# Patient Record
Sex: FEMALE | Race: WHITE | NOT HISPANIC OR LATINO | Employment: OTHER | URBAN - NONMETROPOLITAN AREA
[De-identification: names, ages, dates, MRNs, and addresses within clinical notes are randomized per-mention and may not be internally consistent; named-entity substitution may affect disease eponyms.]

---

## 2017-01-12 ENCOUNTER — HOSPITAL ENCOUNTER (OUTPATIENT)
Dept: ULTRASOUND IMAGING | Facility: HOSPITAL | Age: 77
Discharge: HOME | End: 2017-01-12
Attending: FAMILY MEDICINE | Admitting: FAMILY MEDICINE
Payer: MEDICARE

## 2017-01-12 PROCEDURE — 76536 US EXAM OF HEAD AND NECK: CPT | Mod: TC

## 2017-01-19 ENCOUNTER — INFUSION THERAPY VISIT (OUTPATIENT)
Dept: INFUSION THERAPY | Facility: OTHER | Age: 77
End: 2017-01-19
Attending: NURSE PRACTITIONER
Payer: MEDICARE

## 2017-01-19 ENCOUNTER — ONCOLOGY VISIT (OUTPATIENT)
Dept: ONCOLOGY | Facility: OTHER | Age: 77
End: 2017-01-19
Attending: NURSE PRACTITIONER
Payer: MEDICARE

## 2017-01-19 VITALS
HEART RATE: 84 BPM | WEIGHT: 101 LBS | SYSTOLIC BLOOD PRESSURE: 130 MMHG | HEIGHT: 59 IN | BODY MASS INDEX: 20.36 KG/M2 | RESPIRATION RATE: 14 BRPM | DIASTOLIC BLOOD PRESSURE: 66 MMHG | OXYGEN SATURATION: 97 % | TEMPERATURE: 97.5 F

## 2017-01-19 VITALS
WEIGHT: 101 LBS | BODY MASS INDEX: 20.36 KG/M2 | SYSTOLIC BLOOD PRESSURE: 140 MMHG | RESPIRATION RATE: 14 BRPM | DIASTOLIC BLOOD PRESSURE: 65 MMHG | HEART RATE: 84 BPM | HEIGHT: 59 IN | TEMPERATURE: 97.5 F | OXYGEN SATURATION: 97 %

## 2017-01-19 DIAGNOSIS — E04.2 NON-TOXIC MULTINODULAR GOITER: ICD-10-CM

## 2017-01-19 DIAGNOSIS — C91.10 CHRONIC LYMPHOCYTIC LEUKEMIA (H): Primary | ICD-10-CM

## 2017-01-19 LAB
ALBUMIN SERPL-MCNC: 3.7 G/DL (ref 3.4–5)
ALP SERPL-CCNC: 78 U/L (ref 40–150)
ALT SERPL W P-5'-P-CCNC: 38 U/L (ref 0–50)
ANION GAP SERPL CALCULATED.3IONS-SCNC: 9 MMOL/L (ref 3–14)
AST SERPL W P-5'-P-CCNC: 31 U/L (ref 0–45)
BASOPHILS # BLD AUTO: 0 10E9/L (ref 0–0.2)
BASOPHILS NFR BLD AUTO: 0.1 %
BILIRUB SERPL-MCNC: 0.7 MG/DL (ref 0.2–1.3)
BUN SERPL-MCNC: 16 MG/DL (ref 7–30)
CALCIUM SERPL-MCNC: 8.8 MG/DL (ref 8.5–10.1)
CHLORIDE SERPL-SCNC: 103 MMOL/L (ref 94–109)
CO2 SERPL-SCNC: 29 MMOL/L (ref 20–32)
CREAT SERPL-MCNC: 0.81 MG/DL (ref 0.52–1.04)
DIFFERENTIAL METHOD BLD: NORMAL
EOSINOPHIL # BLD AUTO: 0.1 10E9/L (ref 0–0.7)
EOSINOPHIL NFR BLD AUTO: 1.2 %
ERYTHROCYTE [DISTWIDTH] IN BLOOD BY AUTOMATED COUNT: 13.9 % (ref 10–15)
GFR SERPL CREATININE-BSD FRML MDRD: 69 ML/MIN/1.7M2
GLUCOSE SERPL-MCNC: 97 MG/DL (ref 70–99)
HCT VFR BLD AUTO: 40.7 % (ref 35–47)
HGB BLD-MCNC: 13.7 G/DL (ref 11.7–15.7)
IMM GRANULOCYTES # BLD: 0 10E9/L (ref 0–0.4)
IMM GRANULOCYTES NFR BLD: 0.1 %
LDH SERPL L TO P-CCNC: 193 U/L (ref 81–234)
LYMPHOCYTES # BLD AUTO: 1.5 10E9/L (ref 0.8–5.3)
LYMPHOCYTES NFR BLD AUTO: 22.4 %
MCH RBC QN AUTO: 29.7 PG (ref 26.5–33)
MCHC RBC AUTO-ENTMCNC: 33.7 G/DL (ref 31.5–36.5)
MCV RBC AUTO: 88 FL (ref 78–100)
MONOCYTES # BLD AUTO: 0.6 10E9/L (ref 0–1.3)
MONOCYTES NFR BLD AUTO: 8.4 %
NEUTROPHILS # BLD AUTO: 4.6 10E9/L (ref 1.6–8.3)
NEUTROPHILS NFR BLD AUTO: 67.8 %
NRBC # BLD AUTO: 0 10*3/UL
NRBC BLD AUTO-RTO: 0 /100
PLATELET # BLD AUTO: 205 10E9/L (ref 150–450)
POTASSIUM SERPL-SCNC: 3.7 MMOL/L (ref 3.4–5.3)
PROT SERPL-MCNC: 6.7 G/DL (ref 6.8–8.8)
RBC # BLD AUTO: 4.61 10E12/L (ref 3.8–5.2)
SODIUM SERPL-SCNC: 141 MMOL/L (ref 133–144)
TSH SERPL DL<=0.05 MIU/L-ACNC: 3.13 MU/L (ref 0.4–4)
WBC # BLD AUTO: 6.8 10E9/L (ref 4–11)

## 2017-01-19 PROCEDURE — 84443 ASSAY THYROID STIM HORMONE: CPT | Performed by: NURSE PRACTITIONER

## 2017-01-19 PROCEDURE — 99212 OFFICE O/P EST SF 10 MIN: CPT

## 2017-01-19 PROCEDURE — 85025 COMPLETE CBC W/AUTO DIFF WBC: CPT | Performed by: NURSE PRACTITIONER

## 2017-01-19 PROCEDURE — 80053 COMPREHEN METABOLIC PANEL: CPT | Performed by: NURSE PRACTITIONER

## 2017-01-19 PROCEDURE — 25000125 ZZHC RX 250: Performed by: INTERNAL MEDICINE

## 2017-01-19 PROCEDURE — 99213 OFFICE O/P EST LOW 20 MIN: CPT | Performed by: NURSE PRACTITIONER

## 2017-01-19 PROCEDURE — 96523 IRRIG DRUG DELIVERY DEVICE: CPT

## 2017-01-19 PROCEDURE — 36415 COLL VENOUS BLD VENIPUNCTURE: CPT | Performed by: NURSE PRACTITIONER

## 2017-01-19 PROCEDURE — 83615 LACTATE (LD) (LDH) ENZYME: CPT | Performed by: NURSE PRACTITIONER

## 2017-01-19 RX ORDER — HEPARIN SODIUM (PORCINE) LOCK FLUSH IV SOLN 100 UNIT/ML 100 UNIT/ML
500 SOLUTION INTRAVENOUS EVERY 8 HOURS
Status: DISCONTINUED | OUTPATIENT
Start: 2017-01-19 | End: 2017-01-19 | Stop reason: HOSPADM

## 2017-01-19 RX ADMIN — HEPARIN 500 UNITS: 100 SYRINGE at 08:51

## 2017-01-19 ASSESSMENT — PAIN SCALES - GENERAL: PAINLEVEL: NO PAIN (0)

## 2017-01-19 NOTE — MR AVS SNAPSHOT
After Visit Summary   1/19/2017    Jennifer Barajas    MRN: [id number]           Patient Information     Date Of Birth          1940        Visit Information        Provider Department      1/19/2017 9:30 AM Yaneli Gray NP [facility] [place]        Care Instructions    We would like to see you back in 3 months. Please come 1week(s) prior for lab work.  We will schedule you for monthly port flushes. When you are in need of a refill of your medications, please call your pharmacy and they will send us the request. If you have any questions please call [phone]          Follow-ups after your visit        Your next 10 appointments already scheduled     Feb 16, 2017  8:30 AM   Level O with HC INF RM 3302   [facility] [place] ([facility] [place] [facility])    [address]  [place] [address]   [phone]            Mar 16, 2017  8:30 AM   Level O with HC INF RM 3308   [facility] [place] ([facility] [place] [facility])    [address]  [place] [address]   [phone]            Apr 13, 2017  8:30 AM   Level O with HC INF RM 3306   [facility] [place] ([facility])    [address]  [place] [address]   [phone]            Apr 13, 2017  9:00 AM   Return Visit with Yaneli Gray NP   [facility] [place] ([facility] [place] [facility])    [address]  [place] [address]   [phone]              Who to contact     If you have questions or need follow up information about today's clinic visit or your schedule please [facility] at [phone].  Normal or non-critical lab and imaging results will be communicated to you by MyChart, letter or phone within 4 business days after the clinic has received the results. If you do not hear from us within 7 days, please contact the clinic through MyChart or phone. If you have a critical or abnormal lab result, we will notify you by phone as soon as possible.  Submit refill requests through  "MyChart or call your pharmacy and they will forward the refill request to us. Please allow 3 business days for your refill to be completed.          Additional Information About Your Visit        MyChart Information     [employer] gives you secure access to your electronic health record. If you see a primary care provider, you can also send messages to your care team and make appointments. If you have questions, please call your primary care clinic.  If you do not have a primary care provider, please call [phone] and they will assist you.        Care EveryWhere ID     This is your Care EveryWhere ID. This could be used by other organizations to access your [facility] medical records  [id number]        Your Vitals Were     Pulse Temperature Respirations Height BMI (Body Mass Index) Pulse Oximetry    84 97.5  F (36.4  C) (Tympanic) 14 1.499 m (4' 11\") 20.39 kg/m2 97%       Blood Pressure from Last 3 Encounters:   01/19/17 140/65   01/19/17 130/66   12/20/16 125/64    Weight from Last 3 Encounters:   01/19/17 45.813 kg (101 lb)   01/19/17 45.813 kg (101 lb)   11/21/16 45.949 kg (101 lb 4.8 oz)              Today, you had the following     No orders found for display       Primary Care Provider Office Phone # Fax #    Zheng De La Rosa [phone]       [facility] [address] [place] [id number]        Thank you!     Thank you for choosing [facility]  for your care. Our goal is always to provide you with excellent care. Hearing back from our patients is one way we can continue to improve our services. Please take a few minutes to complete the written survey that you may receive in the mail after your visit with us. Thank you!             Your Updated Medication List - Protect others around you: Learn how to safely use, store and throw away your medicines at www.disposemymeds.org.          This list is accurate as of: 1/19/17 10:21 AM.  Always use your most recent med list. "                   Brand Name Dispense Instructions for use    alendronate 70 MG tablet    FOSAMAX    12 tablet    TAKE 1 TABLET BY MOUTH EVERY WEEK IN THE MORNIN G; AT LEAST 30 MINUTES B EFORE THE FIRST FOOD, BE VERAGE OR MEDICATION O F THE       calcium gluconate 500 MG Tabs tablet      Take 1,200 mg by mouth daily       lidocaine-prilocaine cream    EMLA    30 g    Apply topically as needed for moderate pain Apply to port site 1 hour before chemotherapy appointment       VITAMIN D3 PO      Take by mouth daily

## 2017-01-19 NOTE — PATIENT INSTRUCTIONS
We would like to see you back in 3 months. Please come 1week(s) prior for lab work.  We will schedule you for monthly port flushes. When you are in need of a refill of your medications, please call your pharmacy and they will send us the request. If you have any questions please call [phone]

## 2017-01-19 NOTE — PROGRESS NOTES
"Hand hygiene performed: yes       Mask donned by caregiver: yes    Site prepped with CHG: yes            Labs drawn: yes  Dressing applied using aseptic technique: yes        8808 Comment: Patients left port accessed using non-coring, 20 gauge, 3/4\" needle. Port accessed per facility protocol. Port flushed easily, blood return noted, wasted 10 mls of blood, labs drawn per orders.  No signs and symptoms of infection or infiltration.  Port flushed 20 mLs Normal Saline then Heparin 5mLs of 100 unit/mL.  Needle removed, small dressing applied.  Patient discharged with no complaints.     Kayla Stark RN                      "

## 2017-01-19 NOTE — NURSING NOTE
"Chief Complaint   Patient presents with     RECHECK     3 month follow up CLL     *_* Living Will *_*     on file       Initial /65 mmHg  Pulse 84  Temp(Src) 97.5  F (36.4  C) (Tympanic)  Resp 14  Ht 1.499 m (4' 11\")  Wt 45.813 kg (101 lb)  BMI 20.39 kg/m2  SpO2 97% Estimated body mass index is 20.39 kg/(m^2) as calculated from the following:    Height as of this encounter: 1.499 m (4' 11\").    Weight as of this encounter: 45.813 kg (101 lb).  BP completed using cuff size: regular  Soco Vna    Patient was assessed using the NCCN psychosocial distress thermometer. Patient rated the score as a 6. Patient rated current stressors as waiting on results. Stressors will be brought to the attention of provider or Oncology RN Care Coordinator for a score of 6 or greater or per nurses discretion.   Soco Van          "

## 2017-01-19 NOTE — PROGRESS NOTES
Oncology Follow-up Visit:  January 19, 2017    Reason for Visit:  Patient presents with:  RECHECK: 3 month follow up CLL  *_* Living Will *_*: on file     Nursing Note and documentation reviewed: yes-discussed-no referrals    HPI:  This is a 76-year-old female patient who presents to the oncology/hematology clinic today in follow up of CLL diagnosed December 2011. She completed 6 cycles of Bendamustine July 2, 2015.  She presents stating she is feeling well.  Once again, she is very nervous about her results today.  She has no complaints of fevers, night sweats, weight loss or fatigue.  She remains very active.    Oncologic History: Jennifer was diagnosed with CLL in December 2011 after her primary care provider noted an elevated WBC on her annual exam. A peripheral blood flow for cytometry revealed she was CD5 positive, CD10 negative, consistent with CLL/SLL. Staging studies were completed and CT scan showed no evidence of lymphadenopathy or splenomegaly. She was asymptomatic with essentially stable lymphocyte count and followed with surveillance.    With patient's follow up in October 2014, hemoglobin was noted to be 10.7 with Hematocrit 30.0 and platelets 137,000. She was subsequently seen again 2 months later with further drop in hemoglobin to 8.6 with hematocrit 26.1 and platelets 140,000 with a white blood cell count of 13.5 and ANC 1.2, absolute lymphocytes were 12.1. A PET scan was completed on 12/11/2014 which did show mildly enlarged axillary lymph nodes with very mild abnormal hypermetabolism of SUV of 2 or less. Bone marrow aspiration and biopsy was completed which revealed infiltration of CLL with 90% of cells confirmed to be CLL via flow morphologically. Patient was staged at least stage III CLL based on her anemia. The plan was to proceed with chemotherapy consisting of Rituxan and Fludara. Patient did experience hypersensitivity reaction with cycle 2 Rituxan therapy experiencing hypotension. This was  discontinued and chemotherapy regime was changed to Bendamustine day 1 and day 2 every 28 days. She was started on 2/12/2015 and completed therapy in July 2015.    Current Chemo Regime/TX:  n/a  Current Cycle:  n/a  # of completed cycles:  n/a    Previous treatment: Rituxan/Fludara completing one full cycle and experienced hypersensitivity with Rituxan with second cycle;  Bendamustine 50 mg per metered squared days 1 and 2 every 28 days x6 cycles completed 7/2/15    Past Medical History   Diagnosis Date     Family history of malignant neoplasm of gastrointestinal tract 10/14/2002     Osteoporosis, unspecified 11/29/2010     Family history of ischemic heart disease 11/29/2010     Diverticulosis of colon (without mention of hemorrhage) 11/29/2010     Other and unspecified hyperlipidemia 11/28/2011     Viral warts, unspecified 11/28/2011     Chronic lymphoid leukemia, 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 2/28/2012     Non-toxic multinodular goiter 11/8/2016       Past Surgical History   Procedure Laterality Date     Colonoscopy  01/12/2010     repeat in 2015     Colonoscopy       Colonoscopy       ------------other-------------       elbow repair     Orthopedic surgery  1993     Left elbow repair     Insert port vascular access N/A 1/5/2015     Procedure: INSERT PORT VASCULAR ACCESS;  Surgeon: Linda Oliver MD;  Location: [facility] OR       Family History   Problem Relation Age of Onset     CANCER Mother 97     colon cancer - cause of death     CANCER Father      DIABETES Brother      Obesity Brother      Breast Cancer Daughter        Social History     Social History     Marital Status:      Spouse Name: N/A     Number of Children: N/A     Years of Education: N/A     Occupational History     [employer]      Social History Main Topics     Smoking status: Never Smoker      Smokeless tobacco: Never Used     Alcohol Use: No     Drug Use: No     Sexual Activity: No      Comment: devorced     Other Topics  "Concern     Parent/Sibling W/ Cabg, Mi Or Angioplasty Before 65f 55m? No      Service No     Blood Transfusions Yes     permits if needed     Caffeine Concern Yes     1 cup     Occupational Exposure No     Hobby Hazards No     Sleep Concern No     Stress Concern No     Weight Concern No     Special Diet No     Back Care No     Exercise No     Seat Belt Yes     Social History Narrative       Current Outpatient Prescriptions   Medication     alendronate (FOSAMAX) 70 MG tablet     calcium gluconate 500 MG TABS     Cholecalciferol (VITAMIN D3 PO)     lidocaine-prilocaine (EMLA) cream     No current facility-administered medications for this visit.     Facility-Administered Medications Ordered in Other Visits   Medication     sodium chloride (PF) 0.9% PF flush 10 mL     heparin 100 UNIT/ML injection 500 Units        Allergies   Allergen Reactions     Sulfa Drugs Rash     Rituxan [Rituximab]      Simvastatin Other (See Comments)     Takes pravastatin at home  Zocor - myalgia       Review Of Systems:  Constitutional: denies fever, weight changes, chills, and night sweats.  Eyes: denies blurred or double vision  Ears/Nose/Throat: denies ear pain, nose problems, difficulty swallowing  Respiratory: denies shortness of breath, cough   Skin: denies rash, lesions  Cardiovascular: denies chest pain, palpitations, edema  Gastrointestinal: denies abdominal pain, bloating, nausea, vomiting, early satiety  Genitourinary: denies difficulty with urination, blood in urine  Musculoskeletal:denies new muscle pain, bone pain  Neurologic: denies lightheadedness, headaches, numbness or tingling  Psychiatric: see HPI  Hematologic/Lymphatic/Immunologic: denies easy bruising, easy bleeding, lumps or bumps noted  Endocrine: Denies increased thirst    Physical Exam:  /65 mmHg  Pulse 84  Temp(Src) 97.5  F (36.4  C) (Tympanic)  Resp 14  Ht 1.499 m (4' 11\")  Wt 45.813 kg (101 lb)  BMI 20.39 kg/m2  SpO2 97%    GENERAL APPEARANCE: " Healthy, alert and in no acute distress.  HEENT: Normocephalic, Sclerae anicteric. Oropharynx without ulcers, lesions, or thrush.  NECK: Supple. No asymmetry or masses, no thyromegaly.  LYMPHATICS: No palpable cervical, supraclavicular, axillary, or inguinal nodes   RESP: Lungs clear to auscultation bilaterally, respirations regular and easy  CARDIOVASCULAR: Regular rate and rhythm. Normal S1, S2  ABDOMEN: Soft, nontender. Bowel sounds auscultated all 4 quadrants. No palpable organomegaly or masses.  MUSCULOSKELETAL: Extremities without gross deformities noted. No edema of bilateral lower extremities.  SKIN: No suspicious lesions or rashes.  NEURO: Alert and oriented x 3.  Gait steady.  PSYCHIATRIC: Mentation and affect appear normal.  Mood appropriate.    Laboratory:  Results for orders placed or performed in visit on 01/19/17   CBC with platelets differential   Result Value Ref Range    WBC 6.8 4.0 - 11.0 10e9/L    RBC Count 4.61 3.8 - 5.2 10e12/L    Hemoglobin 13.7 11.7 - 15.7 g/dL    Hematocrit 40.7 35.0 - 47.0 %    MCV 88 78 - 100 fl    MCH 29.7 26.5 - 33.0 pg    MCHC 33.7 31.5 - 36.5 g/dL    RDW 13.9 10.0 - 15.0 %    Platelet Count 205 150 - 450 10e9/L    Diff Method Automated Method     % Neutrophils 67.8 %    % Lymphocytes 22.4 %    % Monocytes 8.4 %    % Eosinophils 1.2 %    % Basophils 0.1 %    % Immature Granulocytes 0.1 %    Nucleated RBCs 0 0 /100    Absolute Neutrophil 4.6 1.6 - 8.3 10e9/L    Absolute Lymphocytes 1.5 0.8 - 5.3 10e9/L    Absolute Monocytes 0.6 0.0 - 1.3 10e9/L    Absolute Eosinophils 0.1 0.0 - 0.7 10e9/L    Absolute Basophils 0.0 0.0 - 0.2 10e9/L    Abs Immature Granulocytes 0.0 0 - 0.4 10e9/L    Absolute Nucleated RBC 0.0    Comprehensive metabolic panel   Result Value Ref Range    Sodium 141 133 - 144 mmol/L    Potassium 3.7 3.4 - 5.3 mmol/L    Chloride 103 94 - 109 mmol/L    Carbon Dioxide 29 20 - 32 mmol/L    Anion Gap 9 3 - 14 mmol/L    Glucose 97 70 - 99 mg/dL    Urea Nitrogen 16  7 - 30 mg/dL    Creatinine 0.81 0.52 - 1.04 mg/dL    GFR Estimate 69 >60 mL/min/1.7m2    GFR Estimate If Black 84 >60 mL/min/1.7m2    Calcium 8.8 8.5 - 10.1 mg/dL    Bilirubin Total 0.7 0.2 - 1.3 mg/dL    Albumin 3.7 3.4 - 5.0 g/dL    Protein Total 6.7 (L) 6.8 - 8.8 g/dL    Alkaline Phosphatase 78 40 - 150 U/L    ALT 38 0 - 50 U/L    AST 31 0 - 45 U/L   Lactate Dehydrogenase   Result Value Ref Range    Lactate Dehydrogenase 193 81 - 234 U/L   TSH   Result Value Ref Range    TSH 3.13 0.40 - 4.00 mU/L       Imaging Studies:  None for today      ASSESSMENT/PLAN:    #1 CLL/SLL: Diagnosed with CLL/SLL in December 2011. Stage III. She received 6 cycles of bendamustine and is now followed with surveillance.  CBC WNL.  She will follow up in 3 months with a CBC, CMP and LDH.    I encouraged patient to call with any questions or concerns.      Yaneli Gray  [facility]

## 2017-02-16 ENCOUNTER — INFUSION THERAPY VISIT (OUTPATIENT)
Dept: INFUSION THERAPY | Facility: OTHER | Age: 77
End: 2017-02-16
Attending: INTERNAL MEDICINE
Payer: MEDICARE

## 2017-02-16 VITALS
RESPIRATION RATE: 16 BRPM | BODY MASS INDEX: 20.36 KG/M2 | HEIGHT: 59 IN | SYSTOLIC BLOOD PRESSURE: 148 MMHG | WEIGHT: 101 LBS | DIASTOLIC BLOOD PRESSURE: 70 MMHG | TEMPERATURE: 97.8 F

## 2017-02-16 DIAGNOSIS — C91.10 CHRONIC LYMPHOCYTIC LEUKEMIA (H): Primary | ICD-10-CM

## 2017-02-16 PROCEDURE — 25000128 H RX IP 250 OP 636: Performed by: INTERNAL MEDICINE

## 2017-02-16 PROCEDURE — 96523 IRRIG DRUG DELIVERY DEVICE: CPT

## 2017-02-16 RX ORDER — HEPARIN SODIUM (PORCINE) LOCK FLUSH IV SOLN 100 UNIT/ML 100 UNIT/ML
500 SOLUTION INTRAVENOUS EVERY 8 HOURS
Status: CANCELLED
Start: 2017-02-16

## 2017-02-16 RX ORDER — HEPARIN SODIUM (PORCINE) LOCK FLUSH IV SOLN 100 UNIT/ML 100 UNIT/ML
500 SOLUTION INTRAVENOUS EVERY 8 HOURS
Status: DISCONTINUED | OUTPATIENT
Start: 2017-02-16 | End: 2017-02-16 | Stop reason: HOSPADM

## 2017-02-16 RX ADMIN — SODIUM CHLORIDE, PRESERVATIVE FREE 500 UNITS: 5 INJECTION INTRAVENOUS at 08:33

## 2017-02-16 NOTE — PROGRESS NOTES
Hand hygiene performed: yes   Mask donned by caregiver: yes   Site prepped with CHG: yes   Labs drawn: no   Dressing applied using aseptic technique: ye    Patients Left sided port accessed using non-coring, 22 gauge, 3/4 needle. Port accessed per facility protocol. Port flushed easily, blood return noted.  No signs and symptoms of infection or infiltration.  Port flushed 10 mLs Normal Saline then Heparin 5mLs of 100 unit/mL.  Needle removed, small dressing applied.  Patient discharged with no complaints.

## 2017-02-16 NOTE — MR AVS SNAPSHOT
After Visit Summary   2/16/2017    Jennifer Barajas    MRN: [id number]           Patient Information     Date Of Birth          1940        Visit Information        Provider Department      2/16/2017 8:30 AM HC INF RM 3302 [facility]        Today's Diagnoses     Chronic lymphocytic leukemia (H)    -  1      Care Instructions    We will see you back for your next port flush        Follow-ups after your visit        Your next 10 appointments already scheduled     Mar 16, 2017  8:30 AM CDT   Level O with HC INF RM 3308   [facility] [place] ([facility] [place] [facility])    [address]  [place] [address]   [phone]            Apr 13, 2017  8:30 AM CDT   Level O with HC INF RM 3306   [facility] [place] ([facility] [place] [facility]    [address]  [place] [address]   [phone]            Apr 13, 2017  9:00 AM CDT   Return Visit with Yaneli Gray NP   [facility] [place] ([facility] [place] [facility]    [address]  [place] [address]   [phone]              Who to contact     If you have questions or need follow up information about today's clinic visit or your schedule please contact [facility] directly at [phone].  Normal or non-critical lab and imaging results will be communicated to you by velingohart, letter or phone within 4 business days after the clinic has received the results. If you do not hear from us within 7 days, please contact the clinic through velingohart or phone. If you have a critical or abnormal lab result, we will notify you by phone as soon as possible.  Submit refill requests through [employer] or call your pharmacy and they will forward the refill request to us. Please allow 3 business days for your refill to be completed.          Additional Information About Your Visit        velingohart Information     [employer] gives you secure access to your electronic health record. If you see a primary care provider, you can also send messages to  "your care team and make appointments. If you have questions, please call your primary care clinic.  If you do not have a primary care provider, please call [phone] and they will assist you.        Care EveryWhere ID     This is your Care EveryWhere ID. This could be used by other organizations to access your [facility] medical records  [id number]        Your Vitals Were     Temperature Respirations Height BMI (Body Mass Index)          97.8  F (36.6  C) (Tympanic) 16 1.499 m (4' 11.02\") 20.39 kg/m2         Blood Pressure from Last 3 Encounters:   02/16/17 148/70   01/19/17 140/65   01/19/17 130/66    Weight from Last 3 Encounters:   02/16/17 45.8 kg (101 lb)   01/19/17 45.8 kg (101 lb)   01/19/17 45.8 kg (101 lb)              We Performed the Following     Treatment Conditions     Treatment Conditions        Primary Care Provider Office Phone # Fax #    Zheng De La Rosa [phone]       [facility] [address] [facility] [address]        Thank you!     Thank you for choosing [facility]  for your care. Our goal is always to provide you with excellent care. Hearing back from our patients is one way we can continue to improve our services. Please take a few minutes to complete the written survey that you may receive in the mail after your visit with us. Thank you!             Your Updated Medication List - Protect others around you: Learn how to safely use, store and throw away your medicines at www.disposemymeds.org.          This list is accurate as of: 2/16/17  8:45 AM.  Always use your most recent med list.                   Brand Name Dispense Instructions for use    alendronate 70 MG tablet    FOSAMAX    12 tablet    TAKE 1 TABLET BY MOUTH EVERY WEEK IN THE MORNIN G; AT LEAST 30 MINUTES B EFORE THE FIRST FOOD, BE VERAGE OR MEDICATION O F THE       calcium gluconate 500 MG Tabs tablet      Take 1,200 mg by mouth daily       lidocaine-prilocaine cream    EMLA    30 " g    Apply topically as needed for moderate pain Apply to port site 1 hour before chemotherapy appointment       VITAMIN D3 PO      Take by mouth daily

## 2017-03-16 ENCOUNTER — INFUSION THERAPY VISIT (OUTPATIENT)
Dept: INFUSION THERAPY | Facility: OTHER | Age: 77
End: 2017-03-16
Attending: INTERNAL MEDICINE
Payer: MEDICARE

## 2017-03-16 VITALS
SYSTOLIC BLOOD PRESSURE: 130 MMHG | DIASTOLIC BLOOD PRESSURE: 59 MMHG | OXYGEN SATURATION: 97 % | TEMPERATURE: 97 F | HEART RATE: 76 BPM | RESPIRATION RATE: 16 BRPM

## 2017-03-16 DIAGNOSIS — C91.10 CHRONIC LYMPHOCYTIC LEUKEMIA (H): Primary | ICD-10-CM

## 2017-03-16 PROCEDURE — 96523 IRRIG DRUG DELIVERY DEVICE: CPT

## 2017-03-16 PROCEDURE — 25000128 H RX IP 250 OP 636: Performed by: INTERNAL MEDICINE

## 2017-03-16 RX ORDER — HEPARIN SODIUM (PORCINE) LOCK FLUSH IV SOLN 100 UNIT/ML 100 UNIT/ML
500 SOLUTION INTRAVENOUS EVERY 8 HOURS
Status: DISCONTINUED | OUTPATIENT
Start: 2017-03-16 | End: 2017-03-16 | Stop reason: HOSPADM

## 2017-03-16 RX ORDER — HEPARIN SODIUM (PORCINE) LOCK FLUSH IV SOLN 100 UNIT/ML 100 UNIT/ML
500 SOLUTION INTRAVENOUS EVERY 8 HOURS
Status: CANCELLED
Start: 2017-03-16

## 2017-03-16 RX ADMIN — SODIUM CHLORIDE, PRESERVATIVE FREE 500 UNITS: 5 INJECTION INTRAVENOUS at 08:30

## 2017-03-16 NOTE — PROGRESS NOTES
Hand hygiene performed: yes   Mask donned by caregiver: yes   Site prepped with CHG: yes   Labs drawn: no   Dressing applied using aseptic technique: yes   Patients left sided port accessed using non-coring, 20 gauge, 3/4 inch needle. Port accessed per facility protocol. Port flushed easily, blood return noted.  No signs and symptoms of infection or infiltration.  Port flushed 10 mLs Normal Saline then Heparin 5mLs of 100 unit/mL.  Needle removed, small dressing applied.  Patient discharged with no complaints. Digna Chowdary RN

## 2017-03-16 NOTE — PATIENT INSTRUCTIONS
We will see you back in 4 weeks for your next port flush.  If you have any questions, please call [phone].  If you need a refill, please contact your pharmacy.  Digna Chowdary RN

## 2017-03-16 NOTE — MR AVS SNAPSHOT
After Visit Summary   3/16/2017    Jennifer Barajas    MRN: [id number]           Patient Information     Date Of Birth          1940        Visit Information        Provider Department      3/16/2017 8:30 AM HC INF RM 3308 [facility]        Today's Diagnoses     Chronic lymphocytic leukemia (H)    -  1      Care Instructions    We will see you back in 4 weeks for your next port flush.  If you have any questions, please call [phone].  If you need a refill, please contact your pharmacy.  Digna Chowdary RN          Follow-ups after your visit        Your next 10 appointments already scheduled     Apr 13, 2017  8:30 AM CDT   Level O with HC INF RM 3306   [facility] ([facility])    [address]  [place] [id number] [phone]            Apr 13, 2017  9:00 AM CDT   Return Visit with Yaneli Gray NP   [facility] ([facility])    [address]  [place] [id number] [phone]              Who to contact     If you have questions or need follow up information about today's clinic visit or your schedule please contact [facility] directly at [phone].  Normal or non-critical lab and imaging results will be communicated to you by MyChart, letter or phone within 4 business days after the clinic has received the results. If you do not hear from us within 7 days, please contact the clinic through MyChart or phone. If you have a critical or abnormal lab result, we will notify you by phone as soon as possible.  Submit refill requests through [employer] or call your pharmacy and they will forward the refill request to us. Please allow 3 business days for your refill to be completed.          Additional Information About Your Visit        [employer] Information     [employer] gives you secure access to your electronic health record. If you see a primary care provider, you can also send messages to your care team and make  appointments. If you have questions, please call your primary care clinic.  If you do not have a primary care provider, please call [phone] and they will assist you.        Care EveryWhere ID     This is your Care EveryWhere ID. This could be used by other organizations to access your [place] medical records  [id number]        Your Vitals Were     Pulse Temperature Respirations Pulse Oximetry          76 97  F (36.1  C) (Oral) 16 97%         Blood Pressure from Last 3 Encounters:   03/16/17 130/59   02/16/17 148/70   01/19/17 140/65    Weight from Last 3 Encounters:   02/16/17 45.8 kg (101 lb)   01/19/17 45.8 kg (101 lb)   01/19/17 45.8 kg (101 lb)              Today, you had the following     No orders found for display       Primary Care Provider Office Phone # Fax #    Zheng De La Rosa [phone]       [facility] [address] [place] [address]  [facility] [address]        Thank you!     Thank you for choosing [facility]  for your care. Our goal is always to provide you with excellent care. Hearing back from our patients is one way we can continue to improve our services. Please take a few minutes to complete the written survey that you may receive in the mail after your visit with us. Thank you!             Your Updated Medication List - Protect others around you: Learn how to safely use, store and throw away your medicines at www.disposemymeds.org.          This list is accurate as of: 3/16/17  8:39 AM.  Always use your most recent med list.                   Brand Name Dispense Instructions for use    alendronate 70 MG tablet    FOSAMAX    12 tablet    TAKE 1 TABLET BY MOUTH EVERY WEEK IN THE [facility]; AT LEAST 30 MINUTES B EFORE THE FIRST FOOD, BE VERAGE OR MEDICATION O F THE       calcium gluconate 500 MG Tabs tablet      Take 1,200 mg by mouth daily       lidocaine-prilocaine cream    EMLA    30 g    Apply topically as needed for moderate pain Apply to port site 1 hour  before chemotherapy appointment       VITAMIN D3 PO      Take by mouth daily

## 2017-03-30 DIAGNOSIS — Z12.31 VISIT FOR SCREENING MAMMOGRAM: Primary | ICD-10-CM

## 2017-04-13 ENCOUNTER — INFUSION THERAPY VISIT (OUTPATIENT)
Dept: INFUSION THERAPY | Facility: OTHER | Age: 77
End: 2017-04-13
Attending: INTERNAL MEDICINE
Payer: MEDICARE

## 2017-04-13 ENCOUNTER — ONCOLOGY VISIT (OUTPATIENT)
Dept: ONCOLOGY | Facility: OTHER | Age: 77
End: 2017-04-13
Attending: NURSE PRACTITIONER
Payer: MEDICARE

## 2017-04-13 VITALS
BODY MASS INDEX: 20.56 KG/M2 | DIASTOLIC BLOOD PRESSURE: 72 MMHG | WEIGHT: 102 LBS | HEIGHT: 59 IN | OXYGEN SATURATION: 100 % | SYSTOLIC BLOOD PRESSURE: 131 MMHG | HEART RATE: 80 BPM | TEMPERATURE: 97 F | RESPIRATION RATE: 16 BRPM

## 2017-04-13 VITALS
RESPIRATION RATE: 18 BRPM | TEMPERATURE: 97 F | HEART RATE: 80 BPM | SYSTOLIC BLOOD PRESSURE: 131 MMHG | BODY MASS INDEX: 20.56 KG/M2 | OXYGEN SATURATION: 100 % | HEIGHT: 59 IN | DIASTOLIC BLOOD PRESSURE: 72 MMHG | WEIGHT: 102 LBS

## 2017-04-13 DIAGNOSIS — C91.10 CHRONIC LYMPHOCYTIC LEUKEMIA (H): Primary | ICD-10-CM

## 2017-04-13 LAB
ALBUMIN SERPL-MCNC: 3.8 G/DL (ref 3.4–5)
ALP SERPL-CCNC: 68 U/L (ref 40–150)
ALT SERPL W P-5'-P-CCNC: 31 U/L (ref 0–50)
ANION GAP SERPL CALCULATED.3IONS-SCNC: 8 MMOL/L (ref 3–14)
AST SERPL W P-5'-P-CCNC: 30 U/L (ref 0–45)
BASOPHILS # BLD AUTO: 0 10E9/L (ref 0–0.2)
BASOPHILS NFR BLD AUTO: 0.6 %
BILIRUB SERPL-MCNC: 0.5 MG/DL (ref 0.2–1.3)
BUN SERPL-MCNC: 17 MG/DL (ref 7–30)
CALCIUM SERPL-MCNC: 8.5 MG/DL (ref 8.5–10.1)
CHLORIDE SERPL-SCNC: 104 MMOL/L (ref 94–109)
CO2 SERPL-SCNC: 28 MMOL/L (ref 20–32)
CREAT SERPL-MCNC: 0.74 MG/DL (ref 0.52–1.04)
DIFFERENTIAL METHOD BLD: NORMAL
EOSINOPHIL # BLD AUTO: 0.1 10E9/L (ref 0–0.7)
EOSINOPHIL NFR BLD AUTO: 0.9 %
ERYTHROCYTE [DISTWIDTH] IN BLOOD BY AUTOMATED COUNT: 13.7 % (ref 10–15)
GFR SERPL CREATININE-BSD FRML MDRD: 76 ML/MIN/1.7M2
GLUCOSE SERPL-MCNC: 97 MG/DL (ref 70–99)
HCT VFR BLD AUTO: 41.4 % (ref 35–47)
HGB BLD-MCNC: 13.8 G/DL (ref 11.7–15.7)
IMM GRANULOCYTES # BLD: 0 10E9/L (ref 0–0.4)
IMM GRANULOCYTES NFR BLD: 0.1 %
LDH SERPL L TO P-CCNC: 194 U/L (ref 81–234)
LYMPHOCYTES # BLD AUTO: 1.5 10E9/L (ref 0.8–5.3)
LYMPHOCYTES NFR BLD AUTO: 21.4 %
MCH RBC QN AUTO: 29.6 PG (ref 26.5–33)
MCHC RBC AUTO-ENTMCNC: 33.3 G/DL (ref 31.5–36.5)
MCV RBC AUTO: 89 FL (ref 78–100)
MONOCYTES # BLD AUTO: 0.6 10E9/L (ref 0–1.3)
MONOCYTES NFR BLD AUTO: 8.5 %
NEUTROPHILS # BLD AUTO: 4.8 10E9/L (ref 1.6–8.3)
NEUTROPHILS NFR BLD AUTO: 68.5 %
NRBC # BLD AUTO: 0 10*3/UL
NRBC BLD AUTO-RTO: 0 /100
PLATELET # BLD AUTO: 224 10E9/L (ref 150–450)
POTASSIUM SERPL-SCNC: 4 MMOL/L (ref 3.4–5.3)
PROT SERPL-MCNC: 6.9 G/DL (ref 6.8–8.8)
RBC # BLD AUTO: 4.66 10E12/L (ref 3.8–5.2)
SODIUM SERPL-SCNC: 140 MMOL/L (ref 133–144)
WBC # BLD AUTO: 7 10E9/L (ref 4–11)

## 2017-04-13 PROCEDURE — 96523 IRRIG DRUG DELIVERY DEVICE: CPT

## 2017-04-13 PROCEDURE — 83615 LACTATE (LD) (LDH) ENZYME: CPT | Performed by: NURSE PRACTITIONER

## 2017-04-13 PROCEDURE — 25000128 H RX IP 250 OP 636: Performed by: INTERNAL MEDICINE

## 2017-04-13 PROCEDURE — 36415 COLL VENOUS BLD VENIPUNCTURE: CPT | Performed by: NURSE PRACTITIONER

## 2017-04-13 PROCEDURE — 99212 OFFICE O/P EST SF 10 MIN: CPT

## 2017-04-13 PROCEDURE — 99213 OFFICE O/P EST LOW 20 MIN: CPT | Performed by: NURSE PRACTITIONER

## 2017-04-13 PROCEDURE — 80053 COMPREHEN METABOLIC PANEL: CPT | Performed by: NURSE PRACTITIONER

## 2017-04-13 PROCEDURE — 85025 COMPLETE CBC W/AUTO DIFF WBC: CPT | Performed by: NURSE PRACTITIONER

## 2017-04-13 RX ORDER — HEPARIN SODIUM (PORCINE) LOCK FLUSH IV SOLN 100 UNIT/ML 100 UNIT/ML
500 SOLUTION INTRAVENOUS EVERY 8 HOURS
Status: DISCONTINUED | OUTPATIENT
Start: 2017-04-13 | End: 2017-04-13 | Stop reason: HOSPADM

## 2017-04-13 RX ORDER — HEPARIN SODIUM (PORCINE) LOCK FLUSH IV SOLN 100 UNIT/ML 100 UNIT/ML
500 SOLUTION INTRAVENOUS EVERY 8 HOURS
Status: CANCELLED
Start: 2017-04-13

## 2017-04-13 RX ADMIN — SODIUM CHLORIDE, PRESERVATIVE FREE 500 UNITS: 5 INJECTION INTRAVENOUS at 08:28

## 2017-04-13 ASSESSMENT — PAIN SCALES - GENERAL: PAINLEVEL: NO PAIN (0)

## 2017-04-13 NOTE — PROGRESS NOTES
Hand hygiene performed: yes   Mask donned by caregiver: yes   Site prepped with CHG: yes   Labs drawn: yes  Dressing applied using aseptic technique: yes   Patients left sided port accessed using non-coring, 20 gauge, 3/4 inch needle. Port accessed per facility protocol. Port flushed easily, blood return noted, wasted 10 mls of blood, labs drawn per orders. No signs and symptoms of infection or infiltration. Port flushed 20 mLs Normal Saline then Heparin 5mLs of 100 unit/mL. Needle removed, small dressing applied. Patient discharged with no complaints.  Kayla Alexandra RN

## 2017-04-13 NOTE — MR AVS SNAPSHOT
After Visit Summary   4/13/2017    Jennifer Barajas    MRN: [id number]           Patient Information     Date Of Birth          1940        Visit Information        Provider Department      4/13/2017 8:30 AM HC INF RM 3306 [facility]        Today's Diagnoses     Chronic lymphocytic leukemia (H)    -  1       Follow-ups after your visit        Your next 10 appointments already scheduled     Apr 13, 2017  9:00 AM CDT   Return Visit with Yaneli Gray NP   [facility] ([facility] [place] [facility])    [address]  [place] [address]   [phone]              Who to contact     If you have questions or need follow up information about today's clinic visit or your schedule please contact [facility] directly at [phone].  Normal or non-critical lab and imaging results will be communicated to you by MyChart, letter or phone within 4 business days after the clinic has received the results. If you do not hear from us within 7 days, please contact the clinic through MyChart or phone. If you have a critical or abnormal lab result, we will notify you by phone as soon as possible.  Submit refill requests through [employer] or call your pharmacy and they will forward the refill request to us. Please allow 3 business days for your refill to be completed.          Additional Information About Your Visit        MyChart Information     [employer] gives you secure access to your electronic health record. If you see a primary care provider, you can also send messages to your care team and make appointments. If you have questions, please call your primary care clinic.  If you do not have a primary care provider, please call [phone] and they will assist you.        Care EveryWhere ID     This is your Care EveryWhere ID. This could be used by other organizations to access your [place] medical records  [id number]        Your Vitals Were     Pulse Temperature Respirations  "Height Pulse Oximetry BMI (Body Mass Index)    80 97  F (36.1  C) (Oral) 16 1.499 m (4' 11.02\") 100% 20.59 kg/m2       Blood Pressure from Last 3 Encounters:   04/13/17 131/72   03/16/17 130/59   02/16/17 148/70    Weight from Last 3 Encounters:   04/13/17 46.3 kg (102 lb)   02/16/17 45.8 kg (101 lb)   01/19/17 45.8 kg (101 lb)              We Performed the Following     CBC with platelets differential     Comprehensive metabolic panel     Lactate Dehydrogenase        Primary Care Provider Office Phone # Fax #    Zheng De La Rosa [phone]       [address]  [place] [address]        Thank you!     Thank you for choosing [facility] [place]  for your care. Our goal is always to provide you with excellent care. Hearing back from our patients is one way we can continue to improve our services. Please take a few minutes to complete the written survey that you may receive in the mail after your visit with us. Thank you!             Your Updated Medication List - Protect others around you: Learn how to safely use, store and throw away your medicines at www.disposemymeds.org.          This list is accurate as of: 4/13/17  8:46 AM.  Always use your most recent med list.                   Brand Name Dispense Instructions for use    alendronate 70 MG tablet    FOSAMAX    12 tablet    TAKE 1 TABLET BY MOUTH EVERY WEEK IN THE [facility]; AT LEAST 30 MINUTES B EFORE THE FIRST FOOD, BE VERAGE OR MEDICATION O F THE       calcium gluconate 500 MG Tabs tablet      Take 1,200 mg by mouth daily       lidocaine-prilocaine cream    EMLA    30 g    Apply topically as needed for moderate pain Apply to port site 1 hour before chemotherapy appointment       VITAMIN D3 PO      Take by mouth daily Reported on 4/13/2017         "

## 2017-04-13 NOTE — PATIENT INSTRUCTIONS
We would like to see you back in 3 months. Please come 1hour(s) prior for lab work.You will need to come in for monthly port flushes.  Your prescription has been sent to: When you are in need of a refill of your medications, please call your pharmacy and they will send us the request. If you have any questions please call [phone]

## 2017-04-13 NOTE — PROGRESS NOTES
Oncology Follow-up Visit:  April 13, 2017    Reason for Visit:  Patient presents with:  RECHECK: Follow up CLL  *_* Health Care Directive *_*: on file     Nursing Note and documentation reviewed: yes    HPI:  This is a 76-year-old female patient who presents to the oncology/hematology clinic today in follow up of CLL diagnosed December 2011. She completed 6 cycles of Bendamustine July 2, 2015.  She is feeling good.  She has no issues with night sweats, weight loss or increased fatigue. She remains very active.  She does have increased anxiety just prior to this appointment as she is fearful that her labwork will not be good.    Oncologic History: Jennifer was diagnosed with CLL in December 2011 after her primary care provider noted an elevated WBC on her annual exam. A peripheral blood flow for cytometry revealed she was CD5 positive, CD10 negative, consistent with CLL/SLL. Staging studies were completed and CT scan showed no evidence of lymphadenopathy or splenomegaly. She was asymptomatic with essentially stable lymphocyte count and followed with surveillance.     With patient's follow up in October 2014, hemoglobin was noted to be 10.7 with Hematocrit 30.0 and platelets 137,000. She was subsequently seen again 2 months later with further drop in hemoglobin to 8.6 with hematocrit 26.1 and platelets 140,000 with a white blood cell count of 13.5 and ANC 1.2, absolute lymphocytes were 12.1. A PET scan was completed on 12/11/2014 which did show mildly enlarged axillary lymph nodes with very mild abnormal hypermetabolism of SUV of 2 or less. Bone marrow aspiration and biopsy was completed which revealed infiltration of CLL with 90% of cells confirmed to be CLL via flow morphologically. Patient was staged at least stage III CLL based on her anemia. The plan was to proceed with chemotherapy consisting of Rituxan and Fludara. Patient did experience hypersensitivity reaction with cycle 2 Rituxan therapy experiencing  hypotension. This was discontinued and chemotherapy regime was changed to Bendamustine day 1 and day 2 every 28 days. She was started on 2/12/2015 and completed therapy in July 2015.     Current Chemo Regime/TX:  n/a  Current Cycle:  n/a  # of completed cycles:  n/a     Previous treatment: Rituxan/Fludara completing one full cycle and experienced hypersensitivity with Rituxan with second cycle;  Bendamustine 50 mg per metered squared days 1 and 2 every 28 days x6 cycles completed 7/2/15    Past Medical History:   Diagnosis Date     Chronic lymphoid leukemia, 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 2/28/2012     Diverticulosis of colon (without mention of hemorrhage) 11/29/2010     Family history of ischemic heart disease 11/29/2010     Family history of malignant neoplasm of gastrointestinal tract 10/14/2002     Non-toxic multinodular goiter 11/8/2016     Osteoporosis, unspecified 11/29/2010     Other and unspecified hyperlipidemia 11/28/2011     Viral warts, unspecified 11/28/2011       Past Surgical History:   Procedure Laterality Date     ------------OTHER-------------      elbow repair     COLONOSCOPY  01/12/2010    repeat in 2015     COLONOSCOPY       COLONOSCOPY       INSERT PORT VASCULAR ACCESS N/A 1/5/2015    Procedure: INSERT PORT VASCULAR ACCESS;  Surgeon: Linda Oliver MD;  Location: [facility]     ORTHOPEDIC SURGERY  1993    Left elbow repair       Family History   Problem Relation Age of Onset     CANCER Mother 97     colon cancer - cause of death     CANCER Father      DIABETES Brother      Obesity Brother      Breast Cancer Daughter        Social History     Social History     Marital status:      Spouse name: N/A     Number of children: N/A     Years of education: N/A     Occupational History     [employer]      Social History Main Topics     Smoking status: Never Smoker     Smokeless tobacco: Never Used     Alcohol use No     Drug use: No     Sexual activity: No      Comment: devorced      Other Topics Concern     Parent/Sibling W/ Cabg, Mi Or Angioplasty Before 65f 55m? No      Service No     Blood Transfusions Yes     permits if needed     Caffeine Concern Yes     1 cup     Occupational Exposure No     Hobby Hazards No     Sleep Concern No     Stress Concern No     Weight Concern No     Special Diet No     Back Care No     Exercise No     Seat Belt Yes     Social History Narrative       Current Outpatient Prescriptions   Medication     alendronate (FOSAMAX) 70 MG tablet     calcium gluconate 500 MG TABS     lidocaine-prilocaine (EMLA) cream     Cholecalciferol (VITAMIN D3 PO)     No current facility-administered medications for this visit.      Facility-Administered Medications Ordered in Other Visits   Medication     sodium chloride (PF) 0.9% PF flush 10 mL     heparin 100 UNIT/ML injection 500 Units        Allergies   Allergen Reactions     Sulfa Drugs Rash     Rituxan [Rituximab]      Simvastatin Other (See Comments)     Takes pravastatin at home  Zocor - myalgia       Review Of Systems:  Constitutional: denies fever, weight changes, chills, and night sweats.  Eyes: denies blurred or double vision  Ears/Nose/Throat: denies ear pain, nose problems, difficulty swallowing  Respiratory: denies shortness of breath, cough   Skin: denies rash, lesions  Breast/Chest wall: denies pain, lumps or discharge-she is due for a mammogram and wants to do this in the fall  Cardiovascular: denies chest pain, palpitations, edema  Gastrointestinal: denies abdominal pain, bloating, nausea, vomiting, early satiety  Genitourinary: denies difficulty with urination, blood in urine  Musculoskeletal:denies new muscle pain, bone pain  Neurologic: denies lightheadedness, headaches, numbness or tingling  Psychiatric: denies anxiety, depression-see hPI  Hematologic/Lymphatic/Immunologic: denies easy bruising, easy bleeding, lumps or bumps noted  Endocrine: Denies increased thirst; she does feel chilled    Physical  "Exam:  /72 (BP Location: Right arm, Patient Position: Chair, Cuff Size: Adult Regular)  Pulse 80  Temp 97  F (36.1  C) (Tympanic)  Resp 18  Ht 1.499 m (4' 11\")  Wt 46.3 kg (102 lb)  SpO2 100%  BMI 20.6 kg/m2    GENERAL APPEARANCE: Healthy, alert and in no acute distress.  HEENT: Normocephalic, Sclerae anicteric. Oropharynx without ulcers, lesions, or thrush.  NECK: No asymmetry or masses, no thyromegaly.  LYMPHATICS: No palpable cervical, supraclavicular, axillary, or inguinal nodes   RESP: Lungs clear to auscultation bilaterally, respirations regular and easy  CARDIOVASCULAR: Regular rate and rhythm. Normal S1, S2; no murmur, gallop, or rub.  ABDOMEN: Soft, nontender. Bowel sounds auscultated all 4 quadrants. No palpable organomegaly or masses.  MUSCULOSKELETAL: Extremities without gross deformities noted. No edema of bilateral lower extremities.  SKIN: No suspicious lesions or rashes.  NEURO: Alert and oriented x 3.  Gait steady.  PSYCHIATRIC: Mentation and affect appear normal.  Mood appropriate.    Laboratory:  Results for orders placed or performed in visit on 04/13/17   CBC with platelets differential   Result Value Ref Range    WBC 7.0 4.0 - 11.0 10e9/L    RBC Count 4.66 3.8 - 5.2 10e12/L    Hemoglobin 13.8 11.7 - 15.7 g/dL    Hematocrit 41.4 35.0 - 47.0 %    MCV 89 78 - 100 fl    MCH 29.6 26.5 - 33.0 pg    MCHC 33.3 31.5 - 36.5 g/dL    RDW 13.7 10.0 - 15.0 %    Platelet Count 224 150 - 450 10e9/L    Diff Method Automated Method     % Neutrophils 68.5 %    % Lymphocytes 21.4 %    % Monocytes 8.5 %    % Eosinophils 0.9 %    % Basophils 0.6 %    % Immature Granulocytes 0.1 %    Nucleated RBCs 0 0 /100    Absolute Neutrophil 4.8 1.6 - 8.3 10e9/L    Absolute Lymphocytes 1.5 0.8 - 5.3 10e9/L    Absolute Monocytes 0.6 0.0 - 1.3 10e9/L    Absolute Eosinophils 0.1 0.0 - 0.7 10e9/L    Absolute Basophils 0.0 0.0 - 0.2 10e9/L    Abs Immature Granulocytes 0.0 0 - 0.4 10e9/L    Absolute Nucleated RBC 0.0  "   Comprehensive metabolic panel   Result Value Ref Range    Sodium 140 133 - 144 mmol/L    Potassium 4.0 3.4 - 5.3 mmol/L    Chloride 104 94 - 109 mmol/L    Carbon Dioxide 28 20 - 32 mmol/L    Anion Gap 8 3 - 14 mmol/L    Glucose 97 70 - 99 mg/dL    Urea Nitrogen 17 7 - 30 mg/dL    Creatinine 0.74 0.52 - 1.04 mg/dL    GFR Estimate 76 >60 mL/min/1.7m2    GFR Estimate If Black >90   GFR Calc   >60 mL/min/1.7m2    Calcium 8.5 8.5 - 10.1 mg/dL    Bilirubin Total 0.5 0.2 - 1.3 mg/dL    Albumin 3.8 3.4 - 5.0 g/dL    Protein Total 6.9 6.8 - 8.8 g/dL    Alkaline Phosphatase 68 40 - 150 U/L    ALT 31 0 - 50 U/L    AST 30 0 - 45 U/L   Lactate Dehydrogenase   Result Value Ref Range    Lactate Dehydrogenase 194 81 - 234 U/L       Imaging Studies:  None for today      ASSESSMENT/PLAN:    #1 CLL/SLL: Diagnosed with CLL/SLL in December 2011. Stage III. She received 6 cycles of bendamustine and is now followed with surveillance. CBC WNL and she is feeling well. She will follow up in 3 months with a CBC, CMP and LDH.     I encouraged patient to call with any questions or concerns.      Yaneli Gray  [facility]

## 2017-04-13 NOTE — NURSING NOTE
"Chief Complaint   Patient presents with     RECHECK     Follow up CLL     *_* Health Care Directive *_*     on file       Initial /72 (BP Location: Right arm, Patient Position: Chair, Cuff Size: Adult Regular)  Pulse 80  Temp 97  F (36.1  C) (Tympanic)  Resp 18  Ht 1.499 m (4' 11\")  Wt 46.3 kg (102 lb)  SpO2 100%  BMI 20.6 kg/m2 Estimated body mass index is 20.6 kg/(m^2) as calculated from the following:    Height as of this encounter: 1.499 m (4' 11\").    Weight as of this encounter: 46.3 kg (102 lb).  Medication Reconciliation: complete     Soco Van    Patient was assessed using the NCCN psychosocial distress thermometer. Patient rated the score as a 0. Patient rated current stressors as n/a. Stressors will be brought to the attention of provider or Oncology RN Care Coordinator for a score of 6 or greater or per nurses discretion.     Soco Van        "

## 2017-04-16 ENCOUNTER — HOSPITAL ENCOUNTER (EMERGENCY)
Facility: HOSPITAL | Age: 77
Discharge: HOME | End: 2017-04-16
Attending: PHYSICIAN ASSISTANT | Admitting: PHYSICIAN ASSISTANT
Payer: MEDICARE

## 2017-04-16 VITALS — OXYGEN SATURATION: 99 % | TEMPERATURE: 97.6 F | DIASTOLIC BLOOD PRESSURE: 84 MMHG | SYSTOLIC BLOOD PRESSURE: 168 MMHG

## 2017-04-16 DIAGNOSIS — Z23 NEED FOR DIPHTHERIA-TETANUS-PERTUSSIS (TDAP) VACCINE, ADULT/ADOLESCENT: ICD-10-CM

## 2017-04-16 DIAGNOSIS — S61.219A FINGER LACERATION, INITIAL ENCOUNTER: ICD-10-CM

## 2017-04-16 PROCEDURE — 40000268 ZZH STATISTIC NO CHARGES

## 2017-04-16 PROCEDURE — 25000125 ZZHC RX 250: Performed by: PHYSICIAN ASSISTANT

## 2017-04-16 PROCEDURE — 12001 RPR S/N/AX/GEN/TRNK 2.5CM/<: CPT

## 2017-04-16 PROCEDURE — 90715 TDAP VACCINE 7 YRS/> IM: CPT | Performed by: PHYSICIAN ASSISTANT

## 2017-04-16 PROCEDURE — 12001 RPR S/N/AX/GEN/TRNK 2.5CM/<: CPT | Performed by: PHYSICIAN ASSISTANT

## 2017-04-16 PROCEDURE — 90471 IMMUNIZATION ADMIN: CPT

## 2017-04-16 RX ORDER — CEPHALEXIN 500 MG/1
500 CAPSULE ORAL 3 TIMES DAILY
Qty: 30 CAPSULE | Refills: 0 | Status: SHIPPED | OUTPATIENT
Start: 2017-04-16 | End: 2017-04-26

## 2017-04-16 RX ADMIN — CLOSTRIDIUM TETANI TOXOID ANTIGEN (FORMALDEHYDE INACTIVATED), CORYNEBACTERIUM DIPHTHERIAE TOXOID ANTIGEN (FORMALDEHYDE INACTIVATED), BORDETELLA PERTUSSIS TOXOID ANTIGEN (GLUTARALDEHYDE INACTIVATED), BORDETELLA PERTUSSIS FILAMENTOUS HEMAGGLUTININ ANTIGEN (FORMALDEHYDE INACTIVATED), BORDETELLA PERTUSSIS PERTACTIN ANTIGEN, AND BORDETELLA PERTUSSIS FIMBRIAE 2/3 ANTIGEN 0.5 ML: 5; 2; 2.5; 5; 3; 5 INJECTION, SUSPENSION INTRAMUSCULAR at 13:26

## 2017-04-16 NOTE — ED AVS SNAPSHOT
[facility]    [address]    [place] [address]    Phone:  [phone]                                       Jennifer Barajas   MRN: [id number]    Department:  [facility]   Date of Visit:  4/16/2017           Patient Information     Date Of Birth          1940        Your diagnoses for this visit were:     Finger laceration, initial encounter     Need for diphtheria-tetanus-pertussis (Tdap) vaccine, adult/adolescent        You were seen by Aby Wiseman PA.      Follow-up Information     Follow up with Zheng De La Rosa MD In 10 days.    Specialty:  Family Practice    Why:  For suture removal, sooner, If symptoms worsen    Contact information:    [facility]  [address]  [facility] [address]  [phone]          Follow up with [facility].    Specialty:  EMERGENCY MEDICINE    Why:  If fever/concerns develop    Contact information:    [address]  [place] [address] [phone]    Additional information:    From [place]: Take [address]. Turn left at [address]. Turn left at the first stoplight on [address]. At the first stop sign, take a right onto [address]. Take a left into the parking lot and continue through until you reach the North enterance of the building.       From [place]: Take [address]. Take the [address] ramp towards [place]. Turn left onto [address]. Take a slight right onto [address]. Turn left at the first stoplight on [address]. At the first stop sign, take a right onto [address]. Take a left into the parking lot and continue through until you reach the North enterance of the building.       From [place]: Take [address]. Take a right at [address]. At the first stop sign, take a right onto [address]. Take a left into the parking lot and continue through until you reach the North enterance of the building.        Discharge References/Attachments     LACERATION, ALL (ENGLISH)    WOUND CARE (ENGLISH)      Future Appointments        Provider Department Dept Phone Center    5/11/2017 9:00 AM HC INF RM 3312 [facility] [phone] [facility] [place]    6/8/2017 9:00 AM HC INF RM 3302 [facility] [phone] [facility] [place]    7/6/2017 9:00 AM HC INF RM 3306 [facility] [phone] [facility] [place]    7/6/2017 9:30 AM Yaneli Gray, NP [facility] [phone] [facility] [place]         Review of your medicines      START taking        Dose / Directions Last dose taken    cephALEXin 500 MG capsule   Commonly known as:  KEFLEX   Dose:  500 mg   Quantity:  30 capsule        Take 1 capsule (500 mg) by mouth 3 times daily for 10 days   Refills:  0          Our records show that you are taking the medicines listed below. If these are incorrect, please call your family doctor or clinic.        Dose / Directions Last dose taken    alendronate 70 MG tablet   Commonly known as:  FOSAMAX   Quantity:  12 tablet        TAKE 1 TABLET BY MOUTH EVERY W[facility]; AT LEAST 30 MINUTES B EFORE THE FIRST FOOD, BE VERAGE OR MEDICATION O F THE   Refills:  3        calcium gluconate 500 MG Tabs tablet   Dose:  1200 mg        Take 1,200 mg by mouth daily   Refills:  0        lidocaine-prilocaine cream   Commonly known as:  EMLA   Quantity:  30 g        Apply topically as needed for moderate pain Apply to port site 1 hour before chemotherapy appointment   Refills:  1        VITAMIN D3 PO        Take by mouth daily Reported on 4/13/2017   Refills:  0                Prescriptions were sent or printed at these locations (1 Prescription)                   [facility] [place] - [address] AT [facility] [address]   [address], [place] [address]    Telephone:  [phone]   Fax:  [phone]   Hours:                  E-Prescribed (1 of 1)         cephALEXin (KEFLEX) 500  MG capsule                Orders Needing Specimen Collection     None      Pending Results     No orders found from 4/14/2017 to 4/17/2017.            Pending Culture Results     No orders found from 4/14/2017 to 4/17/2017.            Thank you for choosing [facility]       Thank you for choosing [facility] for your care. Our goal is always to provide you with excellent care. Hearing back from our patients is one way we can continue to improve our services. Please take a few minutes to complete the written survey that you may receive in the mail after you visit with us. Thank you!        [employer] Information     [employer] gives you secure access to your electronic health record. If you see a primary care provider, you can also send messages to your care team and make appointments. If you have questions, please call your primary care clinic.  If you do not have a primary care provider, please call [phone] and they will assist you.        Care EveryWhere ID     This is your Care EveryWhere ID. This could be used by other organizations to access your [facility] medical records  [id number]        After Visit Summary       This is your record. Keep this with you and show to your community pharmacist(s) and doctor(s) at your next visit.

## 2017-04-16 NOTE — ED AVS SNAPSHOT
[facility]    [address] [address]    Phone:  [phone]                                       Jennifer Barajas   MRN: [id number]    Department:  [facility]   Date of Visit:  4/16/2017           After Visit Summary Signature Page     I have received my discharge instructions, and my questions have been answered. I have discussed any challenges I see with this plan with the nurse or doctor.    ..........................................................................................................................................  Patient/Patient Representative Signature      ..........................................................................................................................................  Patient Representative Print Name and Relationship to Patient    ..................................................               ................................................  Date                                            Time    ..........................................................................................................................................  Reviewed by Signature/Title    ...................................................              ..............................................  Date                                                            Time

## 2017-04-16 NOTE — ED NOTES
"Arrived to triage with c/o finger lacerations on thumb and pinky of left hand. States picked up glass bowl and it broke in hand and caused lacerations. Rates laceration pain 5/10. Did not clean out. Wrapped in towel. Patient very concerned and afraid to look at lacerations. States she thinks she will need to be admitted to the hospital or have surgery. States lacerations are \"very bad.\" Laceration on end of thumb is about 0.5 cm \"V\" shape with minimal bleeding at this time. Laceration on inside of pinky is about 0.3 cm in length with no noted bleeding. Last tetanus documented in 2013.  "

## 2017-04-16 NOTE — ED NOTES
Pt presents with laceration to thumb and pinky finger, as she had picked up a baking dish and it broke in her hand. Pt reported just wrapped hand up as it was bleeding. Pt reports feels little numb. 2/10 pain.

## 2017-04-17 ASSESSMENT — ENCOUNTER SYMPTOMS
WOUND: 1
PSYCHIATRIC NEGATIVE: 1
NEUROLOGICAL NEGATIVE: 1
CARDIOVASCULAR NEGATIVE: 1
CONSTITUTIONAL NEGATIVE: 1

## 2017-04-17 NOTE — ED PROVIDER NOTES
History     Chief Complaint   Patient presents with     Laceration     picked up baking dish and it broke in hand and cut thumb and little finger. Did not clean. Very anxious to look at it.      The history is provided by the patient and a relative. No  was used.     Jennifer Barajas is a 76 year old female who just cut her left thumb, on the tip. States she went to lift a glass bowl and it broke, cutting her thumb. Had much bleeding. Denies any other injury at this time.     Allergies as of 04/16/2017 - Zheng as Reviewed 04/16/2017   Allergen Reaction Noted     Sulfa drugs Rash 01/23/2015     Rituxan [rituximab]  07/23/2015     Simvastatin Other (See Comments)      Discharge Medication List as of 4/16/2017  1:45 PM      START taking these medications    Details   cephALEXin (KEFLEX) 500 MG capsule Take 1 capsule (500 mg) by mouth 3 times daily for 10 days, Disp-30 capsule, R-0, E-Prescribe         CONTINUE these medications which have NOT CHANGED    Details   alendronate (FOSAMAX) 70 MG tablet TAKE 1 TABLET BY MOUTH EVERY WEEK IN THE MORNIN G; AT LEAST 30 MINUTES B EFORE THE FIRST FOOD, BE VERAGE OR MEDICATION O F THE, Disp-12 tablet, R-3, E-Prescribe      calcium gluconate 500 MG TABS Take 1,200 mg by mouth daily , Historical      Cholecalciferol (VITAMIN D3 PO) Take by mouth daily Reported on 4/13/2017, Historical      lidocaine-prilocaine (EMLA) cream Apply topically as needed for moderate pain Apply to port site 1 hour before chemotherapy appointmentDisp-30 g, [id number]           Past Medical History:   Diagnosis Date     Chronic lymphoid leukemia, 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 2/28/2012     Diverticulosis of colon (without mention of hemorrhage) 11/29/2010     Family history of ischemic heart disease 11/29/2010     Family history of malignant neoplasm of gastrointestinal tract 10/14/2002     Non-toxic multinodular goiter 11/8/2016     Osteoporosis, unspecified 11/29/2010      Other and unspecified hyperlipidemia 11/28/2011     Viral warts, unspecified 11/28/2011     Past Surgical History:   Procedure Laterality Date     ------------OTHER-------------      elbow repair     COLONOSCOPY  01/12/2010    repeat in 2015     COLONOSCOPY       COLONOSCOPY       INSERT PORT VASCULAR ACCESS N/A 1/5/2015    Procedure: INSERT PORT VASCULAR ACCESS;  Surgeon: Linda Oliver MD;  Location: [facility]     ORTHOPEDIC SURGERY  1993    Left elbow repair     Family History   Problem Relation Age of Onset     CANCER Mother 97     colon cancer - cause of death     CANCER Father      DIABETES Brother      Obesity Brother      Breast Cancer Daughter      Social History     Social History     Marital status:      Spouse name: N/A     Number of children: N/A     Years of education: N/A     Occupational History     [employer]      Social History Main Topics     Smoking status: Never Smoker     Smokeless tobacco: Never Used     Alcohol use No     Drug use: No     Sexual activity: No      Comment: devorced     Other Topics Concern     Parent/Sibling W/ Cabg, Mi Or Angioplasty Before 65f 55m? No      Service No     Blood Transfusions Yes     permits if needed     Caffeine Concern Yes     1 cup     Occupational Exposure No     Hobby Hazards No     Sleep Concern No     Stress Concern No     Weight Concern No     Special Diet No     Back Care No     Exercise No     Seat Belt Yes     Social History Narrative         I have reviewed the Medications, Allergies, Past Medical and Surgical History, and Social History in the Epic system.    Review of Systems   Constitutional: Negative.    HENT: Negative.    Cardiovascular: Negative.    Skin: Positive for wound.   Neurological: Negative.    Psychiatric/Behavioral: Negative.        Physical Exam   BP: 168/84  Heart Rate: 115  Temp: 97.6  F (36.4  C)  SpO2: 99 %  Physical Exam   Constitutional: She is oriented to person, place, and time. She appears  "well-developed and well-nourished. No distress.   Cardiovascular:   tachycardia   Pulmonary/Chest: Effort normal.   Musculoskeletal:   Left thumb: distal end has an avulsion laceration, in a \"C\" shape.  superficial.  M/n/v intact. +AFROM, 5/5 strength. Minimal bleeding. Moderate TTP   Neurological: She is alert and oriented to person, place, and time.   Skin: She is not diaphoretic.   Psychiatric: She has a normal mood and affect.   Nursing note and vitals reviewed.      ED Course     ED Course     Laceration repair  Performed by: KENZIE GUZMAN  Authorized by: KENZIE GUZMAN   Consent: Verbal consent obtained.  Consent given by: patient  Patient identity confirmed: verbally with patient and provided demographic data  Body area: upper extremity  Location details: left thumb  Laceration length: 1 cm  Foreign bodies: no foreign bodies  Tendon involvement: none  Nerve involvement: none  Anesthesia: local infiltration    Anesthesia:  Anesthesia: local infiltration  Local Anesthetic: lidocaine 1% without epinephrine   Anesthetic total: 1 mL  Preparation: Patient was prepped and draped in the usual sterile fashion (finger tourniquet placed).  Irrigation solution: tap water (with hibiclense and then p. iodine)  Amount of cleaning: standard  Debridement: none  Degree of undermining: none  Skin closure: 5-0 nylon  Number of sutures: 3  Technique: simple  Approximation: close  Approximation difficulty: simple  Dressing: antibiotic ointment and 4x4 sterile gauze (coban)  Patient tolerance: Patient tolerated the procedure well with no immediate complications  Comments: Bleeding resolved               Medications   Tdap (tetanus-diphtheria-acell pertussis) (ADACEL) injection 0.5 mL (0.5 mLs Intramuscular Given 4/16/17 1326)   pt observed x 20 minutes. Pt tolerated well      Assessments & Plan (with Medical Decision Making)     I have reviewed the nursing notes.    I have reviewed the findings, diagnosis, plan and need for " follow up with the patient.    Discharge Medication List as of 4/16/2017  1:45 PM      START taking these medications    Details   cephALEXin (KEFLEX) 500 MG capsule Take 1 capsule (500 mg) by mouth 3 times daily for 10 days, Disp-30 capsule, R-0, E-Prescribe             Final diagnoses:   Finger laceration, initial encounter   Need for diphtheria-tetanus-pertussis (Tdap) vaccine, adult/adolescent       Keep area clean dry.  Elevate injured area above heart as often as possible and when resting. Take OTC motrin 800 mg every 8 hours as needed for pain/swelling. Apply ice at least three times a day x 20 minutes.   Patient verbally educated and given appropriate education sheets for the diagnoses and has no questions.  Take medications as directed.   Follow up with your Primary Care provider in 10 days for suture removal, sooner if symptoms increase.   if further concerns develop, return to the ER  Aby Wiseman Certified   Physician Assistant  4/17/2017  3:59 PM  URGENT CARE CLINIC    4/16/2017   [facility]     Aby Wiseman PA  04/17/17 1600

## 2017-05-01 ENCOUNTER — OFFICE VISIT (OUTPATIENT)
Dept: FAMILY MEDICINE | Facility: OTHER | Age: 77
End: 2017-05-01
Attending: FAMILY MEDICINE
Payer: COMMERCIAL

## 2017-05-01 VITALS
BODY MASS INDEX: 20.4 KG/M2 | TEMPERATURE: 96.9 F | DIASTOLIC BLOOD PRESSURE: 70 MMHG | HEART RATE: 98 BPM | OXYGEN SATURATION: 100 % | RESPIRATION RATE: 17 BRPM | WEIGHT: 101 LBS | SYSTOLIC BLOOD PRESSURE: 120 MMHG

## 2017-05-01 DIAGNOSIS — J06.9 VIRAL UPPER RESPIRATORY TRACT INFECTION: Primary | ICD-10-CM

## 2017-05-01 DIAGNOSIS — B30.9 ACUTE VIRAL CONJUNCTIVITIS OF BOTH EYES: ICD-10-CM

## 2017-05-01 PROCEDURE — 99213 OFFICE O/P EST LOW 20 MIN: CPT | Performed by: FAMILY MEDICINE

## 2017-05-01 PROCEDURE — 99212 OFFICE O/P EST SF 10 MIN: CPT

## 2017-05-01 ASSESSMENT — PAIN SCALES - GENERAL: PAINLEVEL: NO PAIN (0)

## 2017-05-01 NOTE — MR AVS SNAPSHOT
After Visit Summary   5/1/2017    Jennifer Barajas    MRN: [id number]           Patient Information     Date Of Birth          1940        Visit Information        Provider Department      5/1/2017 9:00 AM [facility] De [facility] MD [facility]        Today's Diagnoses     Viral upper respiratory tract infection    -  1    Acute viral conjunctivitis of both eyes          Care Instructions    Keep up with zyrtec and get some over the counter allergy drops.         Follow-ups after your visit        Your next 10 appointments already scheduled     May 01, 2017  9:00 AM CDT   (Arrive by 8:45 AM)   SHORT with Zheng De La Rosa MD   [facility] [place] ([facility] [place] [facility])    [address]  [place] [address]   [phone]            May 11, 2017  9:00 AM CDT   Level O with HC INF RM 3312   [facility] [place] ([facility] [place] [facility])    [address]  [place] [address]   [phone]            Jun 08, 2017  9:00 AM CDT   Level O with HC INF RM 3302   [facility] [place] ([facility] [place] [facility])    [address]  [place] [address]   [phone]            Jul 06, 2017  9:00 AM CDT   Level O with HC INF RM 3306   [facility] [place] ([facility] [place] [facility])    [address]  [place] [address]   [phone]            Jul 06, 2017  9:30 AM CDT   Return Visit with Yaneli Gray NP   [facility] [place] ([facility] [place] [facility])    [address]  [place] [address]   [phone]              Who to contact     If you have questions or need follow up information about today's clinic visit or your schedule please contact [facility] directly at [phone].  Normal or non-critical lab and imaging results will be communicated to you by MyChart, letter or phone within 4 business days after the clinic has received the results. If you do not hear from us within 7 days, please contact the clinic through MyChart or phone. If you have a critical or abnormal lab  result, we will notify you by phone as soon as possible.  Submit refill requests through [employer] or call your pharmacy and they will forward the refill request to us. Please allow 3 business days for your refill to be completed.          Additional Information About Your Visit        [employer] Information     [employer] gives you secure access to your electronic health record. If you see a primary care provider, you can also send messages to your care team and make appointments. If you have questions, please call your primary care clinic.  If you do not have a primary care provider, please call [phone] and they will assist you.        Care EveryWhere ID     This is your Care EveryWhere ID. This could be used by other organizations to access your [facility] medical records  [id number]        Your Vitals Were     Pulse Temperature Respirations Pulse Oximetry BMI (Body Mass Index)       98 96.9  F (36.1  C) 17 100% 20.4 kg/m2        Blood Pressure from Last 3 Encounters:   05/01/17 120/70   04/16/17 168/84   04/13/17 131/72    Weight from Last 3 Encounters:   05/01/17 101 lb (45.8 kg)   04/13/17 102 lb (46.3 kg)   04/13/17 102 lb (46.3 kg)              Today, you had the following     No orders found for display       Primary Care Provider Office Phone # Fax #    Zheng De La Rosa [phone]       [facility] [address] [place] [address]        Thank you!     Thank you for choosing [facility]  for your care. Our goal is always to provide you with excellent care. Hearing back from our patients is one way we can continue to improve our services. Please take a few minutes to complete the written survey that you may receive in the mail after your visit with us. Thank you!             Your Updated Medication List - Protect others around you: Learn how to safely use, store and throw away your medicines at www.disposemymeds.org.          This list is accurate as of: 5/1/17  8:56  AM.  Always use your most recent med list.                   Brand Name Dispense Instructions for use    alendronate 70 MG tablet    FOSAMAX    12 tablet    TAKE 1 TABLET BY MOUTH EVERY WEEK IN THE MORNIN G; AT LEAST 30 MINUTES B EFORE THE FIRST FOOD, BE VERAGE OR MEDICATION O F THE       calcium gluconate 500 MG Tabs tablet      Take 1,200 mg by mouth daily       lidocaine-prilocaine cream    EMLA    30 g    Apply topically as needed for moderate pain Apply to port site 1 hour before chemotherapy appointment       VITAMIN D3 PO      Take by mouth daily Reported on 5/1/2017

## 2017-05-01 NOTE — PROGRESS NOTES
SUBJECTIVE:                                                    Jennifer Barajas is a 76 year old female who presents to clinic today for the following health issues:        RESPIRATORY SYMPTOMS      Duration: 1 week     Description  Eye irritation,nasal congestion, sore throat, cough, fever and chills    Severity: mild    Accompanying signs and symptoms: None    History (predisposing factors):  none    Precipitating or alleviating factors: None    Therapies tried and outcome:  Aidenitussin DM, was on Cephalexin for a finger laceration- finished weds AM    Grandson and other grandkids  ill    Some sinus sx have increased     Mild d/c of both eyes    H/o some seasonal allergies            Problem list and histories reviewed & adjusted, as indicated.  Additional history: as documented    Labs reviewed in EPIC    ROS:  C: NEGATIVE for , change in weight  CV: NEGATIVE for chest pain, palpitations or peripheral edema    OBJECTIVE:                                                    /70  Pulse 98  Temp 96.9  F (36.1  C)  Resp 17  Wt 101 lb (45.8 kg)  SpO2 100%  BMI 20.4 kg/m2  Body mass index is 20.4 kg/(m^2).   GENERAL: healthy, alert, well nourished, well hydrated, no distress  Eyes - some injection and no discharge   HENT: ear canals- normal; TMs- normal; Nose- normal; Mouth- no ulcers, no lesions  NECK: no tenderness, no adenopathy, no asymmetry, no masses, no stiffness; thyroid- normal to palpation  RESP: lungs clear to auscultation - no rales, no rhonchi, no wheezes         ASSESSMENT/PLAN:                                                    (J06.9,  B97.89) Viral upper respiratory tract infection  (primary encounter diagnosis)  Comment: seems to be improving   Plan: Symptomatic treatment was discussed along when patient should call and/or come back into the clinic or go to ER/Urgent care. All questions answered.   Symptomatic treatment was discussed along what is available for OTC medications for symptomatic  relief.       (B30.9) Acute viral conjunctivitis of both eyes  Comment: viral vs allergic   Plan: OTC antihist and eye drops discussed. Symptomatic treatment was discussed along when patient should call and/or come back into the clinic or go to ER/Urgent care. All questions answered.           See Patient Instructions    Zheng De La Rosa MD  [facility]

## 2017-05-01 NOTE — NURSING NOTE
"Chief Complaint   Patient presents with     URI       Initial /70  Pulse 98  Temp 96.9  F (36.1  C)  Resp 17  Wt 101 lb (45.8 kg)  SpO2 100%  BMI 20.4 kg/m2 Estimated body mass index is 20.4 kg/(m^2) as calculated from the following:    Height as of 4/13/17: 4' 11\" (1.499 m).    Weight as of this encounter: 101 lb (45.8 kg).  Medication Reconciliation: complete  "

## 2017-05-11 ENCOUNTER — INFUSION THERAPY VISIT (OUTPATIENT)
Dept: INFUSION THERAPY | Facility: OTHER | Age: 77
End: 2017-05-11
Attending: NURSE PRACTITIONER
Payer: MEDICARE

## 2017-05-11 VITALS
OXYGEN SATURATION: 98 % | BODY MASS INDEX: 20.56 KG/M2 | RESPIRATION RATE: 16 BRPM | DIASTOLIC BLOOD PRESSURE: 68 MMHG | TEMPERATURE: 97 F | HEART RATE: 83 BPM | HEIGHT: 59 IN | WEIGHT: 102 LBS | SYSTOLIC BLOOD PRESSURE: 132 MMHG

## 2017-05-11 DIAGNOSIS — C91.10 CHRONIC LYMPHOCYTIC LEUKEMIA (H): Primary | ICD-10-CM

## 2017-05-11 PROCEDURE — 96523 IRRIG DRUG DELIVERY DEVICE: CPT

## 2017-05-11 PROCEDURE — 25000128 H RX IP 250 OP 636: Performed by: INTERNAL MEDICINE

## 2017-05-11 RX ORDER — HEPARIN SODIUM (PORCINE) LOCK FLUSH IV SOLN 100 UNIT/ML 100 UNIT/ML
500 SOLUTION INTRAVENOUS EVERY 8 HOURS
Status: DISCONTINUED | OUTPATIENT
Start: 2017-05-11 | End: 2017-05-11 | Stop reason: HOSPADM

## 2017-05-11 RX ORDER — HEPARIN SODIUM (PORCINE) LOCK FLUSH IV SOLN 100 UNIT/ML 100 UNIT/ML
500 SOLUTION INTRAVENOUS EVERY 8 HOURS
Status: CANCELLED
Start: 2017-05-11

## 2017-05-11 RX ADMIN — SODIUM CHLORIDE, PRESERVATIVE FREE 500 UNITS: 5 INJECTION INTRAVENOUS at 08:57

## 2017-05-11 NOTE — PROGRESS NOTES
"Hand hygiene performed: yes       Mask donned by caregiver: yes    Site prepped with CHG: yes            Labs drawn: no  Dressing applied using aseptic technique: yes        6171 Comment: Patients left port accessed using non-coring, 20 gauge, 3/4\" needle. Port accessed per facility protocol. Port flushed easily, blood return noted. No signs and symptoms of infection or infiltration.  Port flushed 10 mLs Normal Saline then Heparin 5mLs of 100 unit/mL.  Needle removed, small dressing applied.  Patient discharged with no complaints.     Kayla Alexandra RN  "

## 2017-05-11 NOTE — MR AVS SNAPSHOT
After Visit Summary   5/11/2017    Jennifer Barajas    MRN: [id number]           Patient Information     Date Of Birth          1940        Visit Information        Provider Department      5/11/2017 9:00 AM HC INF RM 3312 [facility]        Today's Diagnoses     Chronic lymphocytic leukemia (H)    -  1       Follow-ups after your visit        Your next 10 appointments already scheduled     Jun 08, 2017  9:00 AM CDT   Level O with HC INF RM 3302   [facility] ([facility] [place] [facility]    [address]  [place] [address]   [phone]            Jul 06, 2017  9:00 AM CDT   Level O with HC INF RM 3306   [facility] ([facility] [place] [facility])    [address]  [place] [address]   [phone]            Jul 06, 2017  9:30 AM CDT   Return Visit with Yaneli Gray NP   [facility] ([facility]    [address]  [place] [address]   [phone]              Who to contact     If you have questions or need follow up information about today's clinic visit or your schedule please contact [facility] directly at [phone].  Normal or non-critical lab and imaging results will be communicated to you by MyChart, letter or phone within 4 business days after the clinic has received the results. If you do not hear from us within 7 days, please contact the clinic through MyChart or phone. If you have a critical or abnormal lab result, we will notify you by phone as soon as possible.  Submit refill requests through [employer] or call your pharmacy and they will forward the refill request to us. Please allow 3 business days for your refill to be completed.          Additional Information About Your Visit        [employer] Information     [employer] gives you secure access to your electronic health record. If you see a primary care provider, you can also send messages to your care team and make appointments. If you have questions, please call  "your primary care clinic.  If you do not have a primary care provider, please call [phone] and they will assist you.        Care EveryWhere ID     This is your Care EveryWhere ID. This could be used by other organizations to access your [facility] medical records  [id number]        Your Vitals Were     Pulse Temperature Respirations Height Pulse Oximetry BMI (Body Mass Index)    83 97  F (36.1  C) (Oral) 16 1.499 m (4' 11.02\") 98% 20.59 kg/m2       Blood Pressure from Last 3 Encounters:   05/11/17 132/68   05/01/17 120/70   04/16/17 168/84    Weight from Last 3 Encounters:   05/11/17 46.3 kg (102 lb)   05/01/17 45.8 kg (101 lb)   04/13/17 46.3 kg (102 lb)              We Performed the Following     Treatment Conditions     Treatment Conditions        Primary Care Provider Office Phone # Fax #    Zheng De La Rosa [phone]       [facility] [address] [place] [id number]        Thank you!     Thank you for choosing [facility] ([facility]3]  for your care. Our goal is always to provide you with excellent care. Hearing back from our patients is one way we can continue to improve our services. Please take a few minutes to complete the written survey that you may receive in the mail after your visit with us. Thank you!             Your Updated Medication List - Protect others around you: Learn how to safely use, store and throw away your medicines at www.disposemymeds.org.          This list is accurate as of: 5/11/17  9:07 AM.  Always use your most recent med list.                   Brand Name Dispense Instructions for use    alendronate 70 MG tablet    FOSAMAX    12 tablet    TAKE 1 TABLET BY MOUTH EVERY WEEK IN THE MORNIN G; AT LEAST 30 MINUTES B EFORE THE FIRST FOOD, BE VERAGE OR MEDICATION O F THE       calcium gluconate 500 MG Tabs tablet      Take 1,200 mg by mouth daily       lidocaine-prilocaine cream    EMLA    30 g    Apply topically as needed for moderate pain Apply to " port site 1 hour before chemotherapy appointment       VITAMIN D3 PO      Take by mouth daily Reported on 5/1/2017

## 2017-06-08 ENCOUNTER — INFUSION THERAPY VISIT (OUTPATIENT)
Dept: INFUSION THERAPY | Facility: OTHER | Age: 77
End: 2017-06-08
Attending: NURSE PRACTITIONER
Payer: MEDICARE

## 2017-06-08 VITALS
OXYGEN SATURATION: 97 % | RESPIRATION RATE: 16 BRPM | SYSTOLIC BLOOD PRESSURE: 133 MMHG | HEART RATE: 87 BPM | TEMPERATURE: 97.3 F | DIASTOLIC BLOOD PRESSURE: 59 MMHG

## 2017-06-08 DIAGNOSIS — C91.10 CHRONIC LYMPHOCYTIC LEUKEMIA (H): Primary | ICD-10-CM

## 2017-06-08 PROCEDURE — 25000128 H RX IP 250 OP 636: Performed by: INTERNAL MEDICINE

## 2017-06-08 PROCEDURE — 96523 IRRIG DRUG DELIVERY DEVICE: CPT

## 2017-06-08 RX ORDER — HEPARIN SODIUM (PORCINE) LOCK FLUSH IV SOLN 100 UNIT/ML 100 UNIT/ML
500 SOLUTION INTRAVENOUS EVERY 8 HOURS
Status: CANCELLED
Start: 2017-06-08

## 2017-06-08 RX ORDER — HEPARIN SODIUM (PORCINE) LOCK FLUSH IV SOLN 100 UNIT/ML 100 UNIT/ML
500 SOLUTION INTRAVENOUS EVERY 8 HOURS
Status: DISCONTINUED | OUTPATIENT
Start: 2017-06-08 | End: 2017-06-08 | Stop reason: HOSPADM

## 2017-06-08 RX ADMIN — SODIUM CHLORIDE, PRESERVATIVE FREE 500 UNITS: 5 INJECTION INTRAVENOUS at 08:53

## 2017-06-08 NOTE — PROGRESS NOTES
Hand hygiene performed: yes   Mask donned by caregiver: yes   Site prepped with CHG: yes   Labs drawn: no   Dressing applied using aseptic technique: yes   Patients left sided port accessed using non-coring, 22 gauge, 3/4 inch needle. Port accessed per facility protocol. Port flushed easily, blood return noted.  No signs and symptoms of infection or infiltration.  Port flushed 10 mLs Normal Saline then Heparin 5mLs of 100 unit/mL.  Needle removed, small dressing applied.  Patient discharged with no complaints.  Digna Chowdary RN

## 2017-06-08 NOTE — MR AVS SNAPSHOT
After Visit Summary   6/8/2017    Jennifer Barajas    MRN: [id number]           Patient Information     Date Of Birth          1940        Visit Information        Provider Department      6/8/2017 9:00 AM HC INF RM 3302 [facility]        Today's Diagnoses     Chronic lymphocytic leukemia (H)    -  1      Care Instructions    We will see you back in 4 weeks for your next port flush.  If you have any questions, please call [phone].  If you need a refill, please contact your pharmacy.            Follow-ups after your visit        Your next 10 appointments already scheduled     Jul 06, 2017  9:00 AM CDT   Level O with HC INF RM 3306   [facility] ([facility] [place] [facility])    [address]  [place] [id number] [phone]            Jul 06, 2017  9:30 AM CDT   Return Visit with Yaneli Gray NP   [facility] ([facility] [place] [facility])    [address]  [place] [id number] [phone]              Who to contact     If you have questions or need follow up information about today's clinic visit or your schedule please contact [facility] directly at [phone].  Normal or non-critical lab and imaging results will be communicated to you by MyChart, letter or phone within 4 business days after the clinic has received the results. If you do not hear from us within 7 days, please contact the clinic through MyChart or phone. If you have a critical or abnormal lab result, we will notify you by phone as soon as possible.  Submit refill requests through [employer] or call your pharmacy and they will forward the refill request to us. Please allow 3 business days for your refill to be completed.          Additional Information About Your Visit        MyChart Information     [employer] gives you secure access to your electronic health record. If you see a primary care provider, you can also send messages to your care team and make appointments. If you have  questions, please call your primary care clinic.  If you do not have a primary care provider, please call [phone] and they will assist you.        Care EveryWhere ID     This is your Care EveryWhere ID. This could be used by other organizations to access your [facility] medical records  [id number]        Your Vitals Were     Pulse Temperature Respirations Pulse Oximetry          87 97.3  F (36.3  C) (Oral) 16 97%         Blood Pressure from Last 3 Encounters:   06/08/17 133/59   05/11/17 132/68   05/01/17 120/70    Weight from Last 3 Encounters:   05/11/17 46.3 kg (102 lb)   05/01/17 45.8 kg (101 lb)   04/13/17 46.3 kg (102 lb)              Today, you had the following     No orders found for display       Primary Care Provider Office Phone # Fax #    Zheng De La Rosa [phone]       [facility] [address] [place] [address]  [facility] [address]        Thank you!     Thank you for choosing [facility]  for your care. Our goal is always to provide you with excellent care. Hearing back from our patients is one way we can continue to improve our services. Please take a few minutes to complete the written survey that you may receive in the mail after your visit with us. Thank you!             Your Updated Medication List - Protect others around you: Learn how to safely use, store and throw away your medicines at www.disposemymeds.org.          This list is accurate as of: 6/8/17  9:01 AM.  Always use your most recent med list.                   Brand Name Dispense Instructions for use    alendronate 70 MG tablet    FOSAMAX    12 tablet    TAKE 1 TABLET BY MOUTH EVERY WEEK IN THE [facility]; AT LEAST 30 MINUTES B EFORE THE FIRST FOOD, BE VERAGE OR MEDICATION O F THE       calcium gluconate 500 MG Tabs tablet      Take 1,200 mg by mouth daily       lidocaine-prilocaine cream    EMLA    30 g    Apply topically as needed for moderate pain Apply to port site 1 hour before chemotherapy  appointment       VITAMIN D3 PO      Take by mouth daily Reported on 5/1/2017

## 2017-06-08 NOTE — PATIENT INSTRUCTIONS
We will see you back in 4 weeks for your next port flush.  If you have any questions, please call [phone].  If you need a refill, please contact your pharmacy.

## 2017-07-06 ENCOUNTER — INFUSION THERAPY VISIT (OUTPATIENT)
Dept: INFUSION THERAPY | Facility: OTHER | Age: 77
End: 2017-07-06
Attending: NURSE PRACTITIONER
Payer: MEDICARE

## 2017-07-06 ENCOUNTER — ONCOLOGY VISIT (OUTPATIENT)
Dept: ONCOLOGY | Facility: OTHER | Age: 77
End: 2017-07-06
Attending: NURSE PRACTITIONER
Payer: MEDICARE

## 2017-07-06 VITALS
WEIGHT: 103.6 LBS | TEMPERATURE: 97.9 F | SYSTOLIC BLOOD PRESSURE: 129 MMHG | BODY MASS INDEX: 20.88 KG/M2 | HEART RATE: 16 BPM | OXYGEN SATURATION: 96 % | HEIGHT: 59 IN | DIASTOLIC BLOOD PRESSURE: 73 MMHG | RESPIRATION RATE: 16 BRPM

## 2017-07-06 VITALS
BODY MASS INDEX: 20.88 KG/M2 | WEIGHT: 103.6 LBS | DIASTOLIC BLOOD PRESSURE: 73 MMHG | SYSTOLIC BLOOD PRESSURE: 129 MMHG | HEART RATE: 85 BPM | TEMPERATURE: 97.9 F | HEIGHT: 59 IN | OXYGEN SATURATION: 96 % | RESPIRATION RATE: 16 BRPM

## 2017-07-06 DIAGNOSIS — C91.10 CHRONIC LYMPHOCYTIC LEUKEMIA (H): Primary | ICD-10-CM

## 2017-07-06 LAB
ALBUMIN SERPL-MCNC: 3.7 G/DL (ref 3.4–5)
ALP SERPL-CCNC: 77 U/L (ref 40–150)
ALT SERPL W P-5'-P-CCNC: 30 U/L (ref 0–50)
ANION GAP SERPL CALCULATED.3IONS-SCNC: 5 MMOL/L (ref 3–14)
AST SERPL W P-5'-P-CCNC: 30 U/L (ref 0–45)
BASOPHILS # BLD AUTO: 0 10E9/L (ref 0–0.2)
BASOPHILS NFR BLD AUTO: 0.6 %
BILIRUB SERPL-MCNC: 0.6 MG/DL (ref 0.2–1.3)
BUN SERPL-MCNC: 12 MG/DL (ref 7–30)
CALCIUM SERPL-MCNC: 9 MG/DL (ref 8.5–10.1)
CHLORIDE SERPL-SCNC: 105 MMOL/L (ref 94–109)
CO2 SERPL-SCNC: 29 MMOL/L (ref 20–32)
CREAT SERPL-MCNC: 0.74 MG/DL (ref 0.52–1.04)
DIFFERENTIAL METHOD BLD: NORMAL
EOSINOPHIL # BLD AUTO: 0.2 10E9/L (ref 0–0.7)
EOSINOPHIL NFR BLD AUTO: 2.7 %
ERYTHROCYTE [DISTWIDTH] IN BLOOD BY AUTOMATED COUNT: 13.8 % (ref 10–15)
GFR SERPL CREATININE-BSD FRML MDRD: 77 ML/MIN/1.7M2
GLUCOSE SERPL-MCNC: 88 MG/DL (ref 70–99)
HCT VFR BLD AUTO: 40.1 % (ref 35–47)
HGB BLD-MCNC: 13.6 G/DL (ref 11.7–15.7)
IMM GRANULOCYTES # BLD: 0 10E9/L (ref 0–0.4)
IMM GRANULOCYTES NFR BLD: 0.3 %
LDH SERPL L TO P-CCNC: 210 U/L (ref 81–234)
LYMPHOCYTES # BLD AUTO: 1.6 10E9/L (ref 0.8–5.3)
LYMPHOCYTES NFR BLD AUTO: 22.3 %
MCH RBC QN AUTO: 29.8 PG (ref 26.5–33)
MCHC RBC AUTO-ENTMCNC: 33.9 G/DL (ref 31.5–36.5)
MCV RBC AUTO: 88 FL (ref 78–100)
MONOCYTES # BLD AUTO: 0.6 10E9/L (ref 0–1.3)
MONOCYTES NFR BLD AUTO: 8.1 %
NEUTROPHILS # BLD AUTO: 4.7 10E9/L (ref 1.6–8.3)
NEUTROPHILS NFR BLD AUTO: 66 %
NRBC # BLD AUTO: 0 10*3/UL
NRBC BLD AUTO-RTO: 0 /100
PLATELET # BLD AUTO: 235 10E9/L (ref 150–450)
POTASSIUM SERPL-SCNC: 3.9 MMOL/L (ref 3.4–5.3)
PROT SERPL-MCNC: 7 G/DL (ref 6.8–8.8)
RBC # BLD AUTO: 4.57 10E12/L (ref 3.8–5.2)
SODIUM SERPL-SCNC: 139 MMOL/L (ref 133–144)
WBC # BLD AUTO: 7.1 10E9/L (ref 4–11)

## 2017-07-06 PROCEDURE — 85025 COMPLETE CBC W/AUTO DIFF WBC: CPT | Mod: ZL | Performed by: NURSE PRACTITIONER

## 2017-07-06 PROCEDURE — 36415 COLL VENOUS BLD VENIPUNCTURE: CPT | Mod: ZL | Performed by: NURSE PRACTITIONER

## 2017-07-06 PROCEDURE — 99212 OFFICE O/P EST SF 10 MIN: CPT

## 2017-07-06 PROCEDURE — 96523 IRRIG DRUG DELIVERY DEVICE: CPT

## 2017-07-06 PROCEDURE — 25000128 H RX IP 250 OP 636: Performed by: INTERNAL MEDICINE

## 2017-07-06 PROCEDURE — 83615 LACTATE (LD) (LDH) ENZYME: CPT | Mod: ZL | Performed by: NURSE PRACTITIONER

## 2017-07-06 PROCEDURE — 80053 COMPREHEN METABOLIC PANEL: CPT | Mod: ZL | Performed by: NURSE PRACTITIONER

## 2017-07-06 PROCEDURE — 99213 OFFICE O/P EST LOW 20 MIN: CPT | Performed by: NURSE PRACTITIONER

## 2017-07-06 RX ORDER — HEPARIN SODIUM (PORCINE) LOCK FLUSH IV SOLN 100 UNIT/ML 100 UNIT/ML
500 SOLUTION INTRAVENOUS EVERY 8 HOURS
Status: DISCONTINUED | OUTPATIENT
Start: 2017-07-06 | End: 2017-07-06 | Stop reason: HOSPADM

## 2017-07-06 RX ORDER — HEPARIN SODIUM (PORCINE) LOCK FLUSH IV SOLN 100 UNIT/ML 100 UNIT/ML
500 SOLUTION INTRAVENOUS EVERY 8 HOURS
Status: CANCELLED
Start: 2017-07-06

## 2017-07-06 RX ADMIN — SODIUM CHLORIDE, PRESERVATIVE FREE 500 UNITS: 5 INJECTION INTRAVENOUS at 08:58

## 2017-07-06 ASSESSMENT — PAIN SCALES - GENERAL: PAINLEVEL: NO PAIN (0)

## 2017-07-06 NOTE — PATIENT INSTRUCTIONS
We would like to see you back in 3 months. Please come 30minute(s) prior for lab work. When you are in need of a refill, please call your pharmacy and they will send us a request. If you have any questions please call [phone]

## 2017-07-06 NOTE — MR AVS SNAPSHOT
After Visit Summary   7/6/2017    Jennifer Barajas    MRN: [id number]           Patient Information     Date Of Birth          1940        Visit Information        Provider Department      7/6/2017 9:30 AM Yaneli Gray NP [facility]        Care Instructions    We would like to see you back in 3 months. Please come 30minute(s) prior for lab work. When you are in need of a refill, please call your pharmacy and they will send us a request. If you have any questions please call [phone]            Follow-ups after your visit        Your next 10 appointments already scheduled     Oct 05, 2017  9:00 AM CDT   LAB with HC LAB   [facility] ([facility]    [address] [place] [id number] [phone]            Oct 05, 2017  9:30 AM CDT   (Arrive by 9:15 AM)   Return Visit with Yaneli Gray NP   [facility]    [address]  [facility] [address]   [phone]              Who to contact     If you have questions or need follow up information about today's clinic visit or your schedule please contact [facility] directly at [phone].  Normal or non-critical lab and imaging results will be communicated to you by [employer], letter or phone within 4 business days after the clinic has received the results. If you do not hear from us within 7 days, please contact the clinic through MyChart or phone. If you have a critical or abnormal lab result, we will notify you by phone as soon as possible.  Submit refill requests through [employer] or call your pharmacy and they will forward the refill request to us. Please allow 3 business days for your refill to be completed.          Additional Information About Your Visit        [employer] Information     [employer] gives you secure access to your electronic health record. If you see a primary care provider, you can also send messages to  "your care team and make appointments. If you have questions, please call your primary care clinic.  If you do not have a primary care provider, please call [phone] and they will assist you.        Care EveryWhere ID     This is your Care EveryWhere ID. This could be used by other organizations to access your [facility] medical records  [id number]        Your Vitals Were     Pulse Temperature Respirations Height Pulse Oximetry BMI (Body Mass Index)    16 97.9  F (36.6  C) (Tympanic) 16 4' 11\" (1.499 m) 96% 20.92 kg/m2       Blood Pressure from Last 3 Encounters:   07/06/17 129/73   07/06/17 129/73   06/08/17 133/59    Weight from Last 3 Encounters:   07/06/17 103 lb 9.6 oz (47 kg)   07/06/17 103 lb 9.6 oz (47 kg)   05/11/17 102 lb (46.3 kg)              Today, you had the following     No orders found for display       Primary Care Provider Office Phone # Fax #    Zheng De La Rosa [phone]       [facility] [address]  [facility] [address]        Equal Access to Services     [facility]: Hadii aad ku hadasho Soomaali, waaxda luqadaha, qaybta kaalmada adeegyada, apolinar larain haybraulion adezelalem jones ladejan ah. So [facility] [phone].    ATENCIÓN: Si habla español, tiene a darden disposición servicios gratuitos de asistencia lingüística. Llame al [phone].    We comply with applicable federal civil rights laws and [place] laws. We do not discriminate on the basis of race, color, national origin, age, disability sex, sexual orientation or gender identity.            Thank you!     Thank you for choosing [facility]  for your care. Our goal is always to provide you with excellent care. Hearing back from our patients is one way we can continue to improve our services. Please take a few minutes to complete the written survey that you may receive in the mail after your visit with us. Thank you!             Your Updated Medication List - Protect others around you: Learn how to " safely use, store and throw away your medicines at www.disposemymeds.org.          This list is accurate as of: 7/6/17 10:01 AM.  Always use your most recent med list.                   Brand Name Dispense Instructions for use Diagnosis    alendronate 70 MG tablet    FOSAMAX    12 tablet    TAKE 1 TABLET BY MOUTH EVERY WEEK IN THE MORNIN G; AT LEAST 30 MINUTES B EFORE THE FIRST FOOD, BE VERAGE OR MEDICATION O F THE    Osteoporosis       calcium gluconate 500 MG Tabs tablet      Take 1,200 mg by mouth daily    Chronic lymphoid leukemia (H)       lidocaine-prilocaine cream    EMLA    30 g    Apply topically as needed for moderate pain Apply to port site 1 hour before chemotherapy appointment    Chronic lymphoid leukemia (H)       VITAMIN D3 PO      Take by mouth daily Reported on 5/1/2017    Chronic lymphoid leukemia (H)

## 2017-07-06 NOTE — NURSING NOTE
"Chief Complaint   Patient presents with     RECHECK     Followup CLL       Initial /73  Pulse (!) 16  Temp 97.9  F (36.6  C) (Tympanic)  Resp 16  Ht 1.499 m (4' 11\")  Wt 47 kg (103 lb 9.6 oz)  SpO2 96%  BMI 20.92 kg/m2 Estimated body mass index is 20.92 kg/(m^2) as calculated from the following:    Height as of this encounter: 1.499 m (4' 11\").    Weight as of this encounter: 47 kg (103 lb 9.6 oz).  Medication Reconciliation: complete     Soco Van    "

## 2017-07-06 NOTE — PROGRESS NOTES
Oncology Follow-up Visit:  July 6, 2017    Reason for Visit:  Patient presents with:  RECHECK: Followup CLL  *_* Health Care Directive *_*: Has advanced care directive     Nursing Note and documentation reviewed: yes    HPI:  This is a 76-year-old female patient who presents to the oncology/hematology clinic today in follow up of CLL diagnosed December 2011. She completed 6 cycles of Bendamustine July 2, 2015.  Patient presents to the clinic stating she is feeling well and offers no complaints.  She did cut her finger in April and was evaluated in the emergency room.  This has healed well.  She does also state she had a upper respiratory infection just over a month ago which did last approximately 3 weeks and she feels she has recovered from this.  She denies any fevers, night sweats, chills and states her weight is stable.  She denies any increased fatigue.    Oncologic History:  Jennifer was diagnosed with CLL in December 2011 after her primary care provider noted an elevated WBC on her annual exam. A peripheral blood flow for cytometry revealed she was CD5 positive, CD10 negative, consistent with CLL/SLL. Staging studies were completed and CT scan showed no evidence of lymphadenopathy or splenomegaly. She was asymptomatic with essentially stable lymphocyte count and followed with surveillance.      With patient's follow up in October 2014, hemoglobin was noted to be 10.7 with Hematocrit 30.0 and platelets 137,000. She was subsequently seen again 2 months later with further drop in hemoglobin to 8.6 with hematocrit 26.1 and platelets 140,000 with a white blood cell count of 13.5 and ANC 1.2, absolute lymphocytes were 12.1. A PET scan was completed on 12/11/2014 which did show mildly enlarged axillary lymph nodes with very mild abnormal hypermetabolism of SUV of 2 or less. Bone marrow aspiration and biopsy was completed which revealed infiltration of CLL with 90% of cells confirmed to be CLL via flow morphologically.  Patient was staged at least stage III CLL based on her anemia. The plan was to proceed with chemotherapy consisting of Rituxan and Fludara. Patient did experience hypersensitivity reaction with cycle 2 Rituxan therapy experiencing hypotension. This was discontinued and chemotherapy regime was changed to Bendamustine day 1 and day 2 every 28 days. She was started on 2/12/2015 and completed therapy in July 2015.      Current Chemo Regime/TX:  n/a  Current Cycle:  n/a  # of completed cycles:  n/a      Previous treatment: Rituxan/Fludara completing one full cycle and experienced hypersensitivity with Rituxan with second cycle;  Bendamustine 50 mg per metered squared days 1 and 2 every 28 days x6 cycles completed 7/2/15    Past Medical History:   Diagnosis Date     Chronic lymphoid leukemia (H) 2/28/2012     Diverticulosis of colon (without mention of hemorrhage) 11/29/2010     Family history of ischemic heart disease 11/29/2010     Family history of malignant neoplasm of gastrointestinal tract 10/14/2002     Non-toxic multinodular goiter 11/8/2016     Osteoporosis, unspecified 11/29/2010     Other and unspecified hyperlipidemia 11/28/2011     Viral warts, unspecified 11/28/2011       Past Surgical History:   Procedure Laterality Date     ------------OTHER-------------      elbow repair     COLONOSCOPY  01/12/2010    repeat in 2015     COLONOSCOPY       COLONOSCOPY       INSERT PORT VASCULAR ACCESS N/A 1/5/2015    Procedure: INSERT PORT VASCULAR ACCESS;  Surgeon: Linda Oliver MD;  Location: [facility]     ORTHOPEDIC SURGERY  1993    Left elbow repair       Family History   Problem Relation Age of Onset     CANCER Mother 97     colon cancer - cause of death     CANCER Father      DIABETES Brother      Obesity Brother      Breast Cancer Daughter        Social History     Social History     Marital status:      Spouse name: N/A     Number of children: N/A     Years of education: N/A     Occupational History      [employer]      Social History Main Topics     Smoking status: Never Smoker     Smokeless tobacco: Never Used     Alcohol use No     Drug use: No     Sexual activity: No      Comment: devorced     Other Topics Concern     Parent/Sibling W/ Cabg, Mi Or Angioplasty Before 65f 55m? No      Service No     Blood Transfusions Yes     permits if needed     Caffeine Concern Yes     1 cup     Occupational Exposure No     Hobby Hazards No     Sleep Concern No     Stress Concern No     Weight Concern No     Special Diet No     Back Care No     Exercise No     Seat Belt Yes     Social History Narrative       Current Outpatient Prescriptions   Medication     alendronate (FOSAMAX) 70 MG tablet     calcium gluconate 500 MG TABS     Cholecalciferol (VITAMIN D3 PO)     lidocaine-prilocaine (EMLA) cream     No current facility-administered medications for this visit.      Facility-Administered Medications Ordered in Other Visits   Medication     sodium chloride (PF) 0.9% PF flush 10 mL     heparin 100 UNIT/ML injection 500 Units        Allergies   Allergen Reactions     Sulfa Drugs Rash     Rituxan [Rituximab]      Simvastatin Other (See Comments)     Takes pravastatin at home  Zocor - myalgia       Review Of Systems:  Constitutional: denies fever, weight changes, chills, and night sweats.  Eyes: denies blurred or double vision  Ears/Nose/Throat: denies ear pain, nose problems, difficulty swallowing  Respiratory: denies shortness of breath, cough   Skin: denies rash, lesions  Cardiovascular: denies chest pain, palpitations, edema  Gastrointestinal: denies abdominal pain, bloating, nausea, vomiting, early satiety  Genitourinary: denies difficulty with urination, blood in urine  Musculoskeletal:denies new muscle pain, bone pain  Neurologic: denies lightheadedness, headaches  Psychiatric: denies anxiety, depression  Hematologic/Lymphatic/Immunologic: denies easy bruising, easy bleeding, lumps or bumps noted  Endocrine: Denies  "increased thirst      Physical Exam:  /73  Pulse (!) 16  Temp 97.9  F (36.6  C) (Tympanic)  Resp 16  Ht 1.499 m (4' 11\")  Wt 47 kg (103 lb 9.6 oz)  SpO2 96%  BMI 20.92 kg/m2    GENERAL APPEARANCE: Healthy, alert and in no acute distress.  HEENT: Normocephalic, Sclerae anicteric. Oropharynx without ulcers, lesions, or thrush.  NECK: Supple. No asymmetry or masses, no thyromegaly.  LYMPHATICS: No palpable cervical, supraclavicular, axillary, or inguinal nodes   RESP: Lungs clear to auscultation bilaterally, respirations regular and easy  CARDIOVASCULAR: Regular rate and rhythm. Normal S1, S2; no murmur, gallop, or rub.  ABDOMEN: Soft, nontender. Bowel sounds auscultated all 4 quadrants. No palpable organomegaly or masses.  MUSCULOSKELETAL: Extremities without gross deformities noted. No edema of bilateral lower extremities.  SKIN: No suspicious lesions or rashes.  NEURO: Alert and oriented x 3.  Gait steady.  PSYCHIATRIC: Mentation and affect appear normal.  Mood appropriate.    Laboratory:  Results for orders placed or performed in visit on 07/06/17   Comprehensive metabolic panel   Result Value Ref Range    Sodium 139 133 - 144 mmol/L    Potassium 3.9 3.4 - 5.3 mmol/L    Chloride 105 94 - 109 mmol/L    Carbon Dioxide 29 20 - 32 mmol/L    Anion Gap 5 3 - 14 mmol/L    Glucose 88 70 - 99 mg/dL    Urea Nitrogen 12 7 - 30 mg/dL    Creatinine 0.74 0.52 - 1.04 mg/dL    GFR Estimate 77 >60 mL/min/1.7m2    GFR Estimate If Black >90   GFR Calc   >60 mL/min/1.7m2    Calcium 9.0 8.5 - 10.1 mg/dL    Bilirubin Total 0.6 0.2 - 1.3 mg/dL    Albumin 3.7 3.4 - 5.0 g/dL    Protein Total 7.0 6.8 - 8.8 g/dL    Alkaline Phosphatase 77 40 - 150 U/L    ALT 30 0 - 50 U/L    AST 30 0 - 45 U/L   CBC with platelets differential   Result Value Ref Range    WBC 7.1 4.0 - 11.0 10e9/L    RBC Count 4.57 3.8 - 5.2 10e12/L    Hemoglobin 13.6 11.7 - 15.7 g/dL    Hematocrit 40.1 35.0 - 47.0 %    MCV 88 78 - 100 fl    MCH " 29.8 26.5 - 33.0 pg    MCHC 33.9 31.5 - 36.5 g/dL    RDW 13.8 10.0 - 15.0 %    Platelet Count 235 150 - 450 10e9/L    Diff Method Automated Method     % Neutrophils 66.0 %    % Lymphocytes 22.3 %    % Monocytes 8.1 %    % Eosinophils 2.7 %    % Basophils 0.6 %    % Immature Granulocytes 0.3 %    Nucleated RBCs 0 0 /100    Absolute Neutrophil 4.7 1.6 - 8.3 10e9/L    Absolute Lymphocytes 1.6 0.8 - 5.3 10e9/L    Absolute Monocytes 0.6 0.0 - 1.3 10e9/L    Absolute Eosinophils 0.2 0.0 - 0.7 10e9/L    Absolute Basophils 0.0 0.0 - 0.2 10e9/L    Abs Immature Granulocytes 0.0 0 - 0.4 10e9/L    Absolute Nucleated RBC 0.0    Lactate Dehydrogenase   Result Value Ref Range    Lactate Dehydrogenase 210 81 - 234 U/L       Imaging Studies:  None for today      ASSESSMENT/PLAN:    #1 CLL/SLL: Diagnosed with CLL/SLL in December 2011. Stage III. She received 6 cycles of bendamustine and is now followed with surveillance.  She will follow up in 3 months with a CBC, CMP and LDH.    I encouraged patient to call with any questions or concerns.    Yaneli Gray  [facility]

## 2017-07-06 NOTE — PATIENT INSTRUCTIONS
We will see you back for your next scheduled appointment. If you have any questions or concerns, please do not hesitate to call us! [phone]

## 2017-07-06 NOTE — MR AVS SNAPSHOT
After Visit Summary   7/6/2017    Jennifer Barajas    MRN: [id number]           Patient Information     Date Of Birth          1940        Visit Information        Provider Department      7/6/2017 9:00 AM HC INF RM 3306 [facility] ([facility]3]        Today's Diagnoses     Chronic lymphocytic leukemia (H)    -  1      Care Instructions    We will see you back for your next scheduled appointment. If you have any questions or concerns, please do not hesitate to call us! [phone]          Follow-ups after your visit        Your next 10 appointments already scheduled     Jul 06, 2017  9:30 AM CDT   Return Visit with Yaneli Gray NP   [facility]    [address] [place]  [facility] [address] [phone]              Who to contact     If you have questions or need follow up information about today's clinic visit or your schedule please contact [facility] directly at [phone].  Normal or non-critical lab and imaging results will be communicated to you by Toplisthart, letter or phone within 4 business days after the clinic has received the results. If you do not hear from us within 7 days, please contact the clinic through Toplisthart or phone. If you have a critical or abnormal lab result, we will notify you by phone as soon as possible.  Submit refill requests through [employer] or call your pharmacy and they will forward the refill request to us. Please allow 3 business days for your refill to be completed.          Additional Information About Your Visit        MyChart Information     [employer] gives you secure access to your electronic health record. If you see a primary care provider, you can also send messages to your care team and make appointments. If you have questions, please call your primary care clinic.  If you do not have a primary care provider, please call [phone] and they will assist you.        Care EveryWhere ID     This  "is your Care EveryWhere ID. This could be used by other organizations to access your [place] medical records  [id number]        Your Vitals Were     Pulse Temperature Respirations Height Pulse Oximetry BMI (Body Mass Index)    85 97.9  F (36.6  C) (Oral) 16 1.499 m (4' 11\") 96% 20.92 kg/m2       Blood Pressure from Last 3 Encounters:   07/06/17 129/73   06/08/17 133/59   05/11/17 132/68    Weight from Last 3 Encounters:   07/06/17 47 kg (103 lb 9.6 oz)   05/11/17 46.3 kg (102 lb)   05/01/17 45.8 kg (101 lb)              We Performed the Following     CBC with platelets differential     Comprehensive metabolic panel     Lactate Dehydrogenase        Primary Care Provider Office Phone # Fax #    Zheng De La Rosa [phone]       [facility] [address]  [place] [address]        Equal Access to Services     RADHA BENAVIDES : Hadii aad ku hadasho Soomaali, waaxda luqadaha, qaybta kaalmada adeegyada, waxay idiin hayaan layne mukherjee . So [facility] [phone].    ATENCIÓN: Si anabella espmarc, tiene a darden disposición servicios gratuitos de asistencia lingüística. Llame al [phone].    We comply with applicable federal civil rights laws and [place] laws. We do not discriminate on the basis of race, color, national origin, age, disability sex, sexual orientation or gender identity.            Thank you!     Thank you for choosing [facility]  for your care. Our goal is always to provide you with excellent care. Hearing back from our patients is one way we can continue to improve our services. Please take a few minutes to complete the written survey that you may receive in the mail after your visit with us. Thank you!             Your Updated Medication List - Protect others around you: Learn how to safely use, store and throw away your medicines at www.disposemymeds.org.          This list is accurate as of: 7/6/17  9:10 AM.  Always use your most recent med list.          "          Brand Name Dispense Instructions for use Diagnosis    alendronate 70 MG tablet    FOSAMAX    12 tablet    TAKE 1 TABLET BY MOUTH EVERY WEEK IN THE MORNIN G; AT LEAST 30 MINUTES B EFORE THE FIRST FOOD, BE VERAGE OR MEDICATION O F THE    Osteoporosis       calcium gluconate 500 MG Tabs tablet      Take 1,200 mg by mouth daily    Chronic lymphoid leukemia (H)       lidocaine-prilocaine cream    EMLA    30 g    Apply topically as needed for moderate pain Apply to port site 1 hour before chemotherapy appointment    Chronic lymphoid leukemia (H)       VITAMIN D3 PO      Take by mouth daily Reported on 5/1/2017    Chronic lymphoid leukemia (H)

## 2017-07-07 ASSESSMENT — PATIENT HEALTH QUESTIONNAIRE - PHQ9: SUM OF ALL RESPONSES TO PHQ QUESTIONS 1-9: 0

## 2017-08-03 ENCOUNTER — INFUSION THERAPY VISIT (OUTPATIENT)
Dept: INFUSION THERAPY | Facility: OTHER | Age: 77
End: 2017-08-03
Attending: NURSE PRACTITIONER
Payer: MEDICARE

## 2017-08-03 VITALS
HEART RATE: 68 BPM | OXYGEN SATURATION: 98 % | DIASTOLIC BLOOD PRESSURE: 62 MMHG | RESPIRATION RATE: 18 BRPM | TEMPERATURE: 96.8 F | SYSTOLIC BLOOD PRESSURE: 158 MMHG

## 2017-08-03 DIAGNOSIS — C91.10 CHRONIC LYMPHOCYTIC LEUKEMIA (H): Primary | ICD-10-CM

## 2017-08-03 PROCEDURE — 25000128 H RX IP 250 OP 636: Performed by: INTERNAL MEDICINE

## 2017-08-03 PROCEDURE — 96523 IRRIG DRUG DELIVERY DEVICE: CPT

## 2017-08-03 RX ORDER — HEPARIN SODIUM (PORCINE) LOCK FLUSH IV SOLN 100 UNIT/ML 100 UNIT/ML
500 SOLUTION INTRAVENOUS EVERY 8 HOURS
Status: DISCONTINUED | OUTPATIENT
Start: 2017-08-03 | End: 2017-08-03 | Stop reason: HOSPADM

## 2017-08-03 RX ORDER — HEPARIN SODIUM (PORCINE) LOCK FLUSH IV SOLN 100 UNIT/ML 100 UNIT/ML
500 SOLUTION INTRAVENOUS EVERY 8 HOURS
Status: CANCELLED
Start: 2017-08-03

## 2017-08-03 RX ADMIN — SODIUM CHLORIDE, PRESERVATIVE FREE 500 UNITS: 5 INJECTION INTRAVENOUS at 10:07

## 2017-08-03 NOTE — PROGRESS NOTES
Hand hygiene performed: yes   Mask donned by caregiver: yes   Site prepped with CHG: yes   Labs drawn: no   Dressing applied using aseptic technique: yes       Patients left sided port accessed using non-coring, 22 gauge, 3/4 needle. Port accessed per facility protocol. Port flushed easily, blood return noted.  No signs and symptoms of infection or infiltration.  Port flushed 10 mLs Normal Saline then Heparin 5mLs of 100 unit/mL.  Needle removed, small dressing applied.  Patient discharged with no complaints.

## 2017-08-03 NOTE — MR AVS SNAPSHOT
After Visit Summary   8/3/2017    Jennifer Barajas    MRN: [id number]           Patient Information     Date Of Birth          1940        Visit Information        Provider Department      8/3/2017 10:00 AM HC INF RM 3308 [facility]        Today's Diagnoses     Chronic lymphocytic leukemia (H)    -  1       Follow-ups after your visit        Your next 10 appointments already scheduled     Sep 01, 2017 10:00 AM CDT   Level O with HC INF RM 3310   [facility] [place] ([facility] [place] )    [address]  [place] [address]   [phone]            Oct 05, 2017  9:00 AM CDT   Level O with HC INF RM 3302   [facility] [place] ([facility] [place] )    [address]  [place] [address]   [phone]            Oct 05, 2017  9:30 AM CDT   (Arrive by 9:15 AM)   Return Visit with Yaneli Gray NP   [facility] [place] ([facility] [place] )    [address]  [place] [address]   [phone]              Who to contact     If you have questions or need follow up information about today's clinic visit or your schedule please contact [facility] directly at [phone].  Normal or non-critical lab and imaging results will be communicated to you by Thinkorswim Grouphart, letter or phone within 4 business days after the clinic has received the results. If you do not hear from us within 7 days, please contact the clinic through [employer] or phone. If you have a critical or abnormal lab result, we will notify you by phone as soon as possible.  Submit refill requests through [employer] or call your pharmacy and they will forward the refill request to us. Please allow 3 business days for your refill to be completed.          Additional Information About Your Visit        [employer] Grou[employer] Information     [employer] gives you secure access to your electronic health record. If you see a primary care provider, you can also send messages to your care  team and make appointments. If you have questions, please call your primary care clinic.  If you do not have a primary care provider, please call [phone] and they will assist you.        Care EveryWhere ID     This is your Care EveryWhere ID. This could be used by other organizations to access your [place] medical records  [id number]        Your Vitals Were     Pulse Temperature Respirations Pulse Oximetry          68 96.8  F (36  C) (Oral) 18 98%         Blood Pressure from Last 3 Encounters:   08/03/17 158/62   07/06/17 129/73   07/06/17 129/73    Weight from Last 3 Encounters:   07/06/17 47 kg (103 lb 9.6 oz)   07/06/17 47 kg (103 lb 9.6 oz)   05/11/17 46.3 kg (102 lb)              Today, you had the following     No orders found for display       Primary Care Provider Office Phone # Fax #    Zheng De La Rosa [phone]       [facility] [address]  [facility] [address]        Equal Access to Services     RADHA BENAVIDES : Hadii aad ku hadasho Soomaali, waaxda luqadaha, qaybta kaalmada adeegyada, apolinar pereira haykendell mukherjee . So [facility] [phone].    ATENCIÓN: Si habla español, tiene a darden disposición servicios gratuitos de asistencia lingüística. Llame al [phone].    We comply with applicable federal civil rights laws and [place] laws. We do not discriminate on the basis of race, color, national origin, age, disability sex, sexual orientation or gender identity.            Thank you!     [facility]  for your care. Our goal is always to provide you with excellent care. Hearing back from our patients is one way we can continue to improve our services. Please take a few minutes to complete the written survey that you may receive in the mail after your visit with us. Thank you!             Your Updated Medication List - Protect others around you: Learn how to safely use, store and throw away your medicines at  www.disposemymeds.org.          This list is accurate as of: 8/3/17 10:12 AM.  Always use your most recent med list.                   Brand Name Dispense Instructions for use Diagnosis    alendronate 70 MG tablet    FOSAMAX    12 tablet    TAKE 1 TABLET BY MOUTH EVERY WEEK IN THE MORNIN G; AT LEAST 30 MINUTES B EFORE THE FIRST FOOD, BE VERAGE OR MEDICATION O F THE    Osteoporosis       calcium gluconate 500 MG Tabs tablet      Take 1,200 mg by mouth daily    Chronic lymphoid leukemia (H)       lidocaine-prilocaine cream    EMLA    30 g    Apply topically as needed for moderate pain Apply to port site 1 hour before chemotherapy appointment    Chronic lymphoid leukemia (H)       VITAMIN D3 PO      Take by mouth daily Reported on 5/1/2017    Chronic lymphoid leukemia (H)

## 2017-09-01 ENCOUNTER — INFUSION THERAPY VISIT (OUTPATIENT)
Dept: INFUSION THERAPY | Facility: OTHER | Age: 77
End: 2017-09-01
Attending: NURSE PRACTITIONER
Payer: MEDICARE

## 2017-09-01 VITALS
DIASTOLIC BLOOD PRESSURE: 71 MMHG | HEART RATE: 74 BPM | OXYGEN SATURATION: 98 % | SYSTOLIC BLOOD PRESSURE: 130 MMHG | HEIGHT: 59 IN | TEMPERATURE: 97.7 F | RESPIRATION RATE: 16 BRPM | BODY MASS INDEX: 20.88 KG/M2 | WEIGHT: 103.6 LBS

## 2017-09-01 DIAGNOSIS — C91.10 CHRONIC LYMPHOCYTIC LEUKEMIA (H): Primary | ICD-10-CM

## 2017-09-01 PROCEDURE — 96523 IRRIG DRUG DELIVERY DEVICE: CPT

## 2017-09-01 PROCEDURE — 25000128 H RX IP 250 OP 636: Performed by: INTERNAL MEDICINE

## 2017-09-01 RX ORDER — HEPARIN SODIUM (PORCINE) LOCK FLUSH IV SOLN 100 UNIT/ML 100 UNIT/ML
500 SOLUTION INTRAVENOUS EVERY 8 HOURS
Status: CANCELLED
Start: 2017-09-01

## 2017-09-01 RX ORDER — HEPARIN SODIUM (PORCINE) LOCK FLUSH IV SOLN 100 UNIT/ML 100 UNIT/ML
500 SOLUTION INTRAVENOUS EVERY 8 HOURS
Status: DISCONTINUED | OUTPATIENT
Start: 2017-09-01 | End: 2017-09-01 | Stop reason: HOSPADM

## 2017-09-01 RX ADMIN — SODIUM CHLORIDE, PRESERVATIVE FREE 500 UNITS: 5 INJECTION INTRAVENOUS at 10:02

## 2017-09-01 NOTE — MR AVS SNAPSHOT
After Visit Summary   9/1/2017    Jennifer Barajas    MRN: [id number]           Patient Information     Date Of Birth          1940        Visit Information        Provider Department      9/1/2017 10:00 AM HC INF RM 3310 [facility] [place]        Today's Diagnoses     Chronic lymphocytic leukemia (H)    -  1      Care Instructions    We will see you back for your next scheduled appointment.            Follow-ups after your visit        Your next 10 appointments already scheduled     Oct 05, 2017  9:00 AM CDT   Level O with HC INF RM 3302   [facility] ([facility] [place] )    [address]  [place] [address]   [phone]            Oct 05, 2017  9:30 AM CDT   (Arrive by 9:15 AM)   Return Visit with Yaneli Gray NP   [facility] ([facility] [place] )    [address]  [place] [address]   [phone]              Who to contact     If you have questions or need follow up information about today's clinic visit or your schedule please contact [facility] directly at [phone].  Normal or non-critical lab and imaging results will be communicated to you by MyChart, letter or phone within 4 business days after the clinic has received the results. If you do not hear from us within 7 days, please contact the clinic through CryoLifehart or phone. If you have a critical or abnormal lab result, we will notify you by phone as soon as possible.  Submit refill requests through [employer] or call your pharmacy and they will forward the refill request to us. Please allow 3 business days for your refill to be completed.          Additional Information About Your Visit        MyChart Information     [employer] gives you secure access to your electronic health record. If you see a primary care provider, you can also send messages to your care team and make appointments. If you have questions, please call your primary care clinic.  If  "you do not have a primary care provider, please call [phone] and they will assist you.        Care EveryWhere ID     This is your Care EveryWhere ID. This could be used by other organizations to access your [place] medical records  [id number]        Your Vitals Were     Pulse Temperature Respirations Height Pulse Oximetry BMI (Body Mass Index)    74 97.7  F (36.5  C) (Oral) 16 1.499 m (4' 11.02\") 98% 20.91 kg/m2       Blood Pressure from Last 3 Encounters:   09/01/17 130/71   08/03/17 158/62   07/06/17 129/73    Weight from Last 3 Encounters:   09/01/17 47 kg (103 lb 9.6 oz)   07/06/17 47 kg (103 lb 9.6 oz)   07/06/17 47 kg (103 lb 9.6 oz)              Today, you had the following     No orders found for display       Primary Care Provider Office Phone # Fax #    Zheng De La Rosa [phone]       [facility] [address] [facility] [address]        Equal Access to Services     [facility]: Hadii aad ku hadasho Soomaali, waaxda luqadaha, qaybta kaalmada adejoanneda, apolinar mukherjee . So [facility] [phone].    ATENCIÓN: Si habla español, tiene a darden disposición servicios gratuitos de asistencia lingüística. Alin al [phone].    We comply with applicable federal civil rights laws and [place] laws. We do not discriminate on the basis of race, color, national origin, age, disability sex, sexual orientation or gender identity.            Thank you!     Thank you for choosing [facility]  for your care. Our goal is always to provide you with excellent care. Hearing back from our patients is one way we can continue to improve our services. Please take a few minutes to complete the written survey that you may receive in the mail after your visit with us. Thank you!             Your Updated Medication List - Protect others around you: Learn how to safely use, store and throw away your medicines at www.disposemymeds.org.          This list is " accurate as of: 9/1/17 10:22 AM.  Always use your most recent med list.                   Brand Name Dispense Instructions for use Diagnosis    alendronate 70 MG tablet    FOSAMAX    12 tablet    TAKE 1 TABLET BY MOUTH EVERY WEEK IN THE MORNIN G; AT LEAST 30 MINUTES B EFORE THE FIRST FOOD, BE VERAGE OR MEDICATION O F THE    Osteoporosis       calcium gluconate 500 MG Tabs tablet      Take 1,200 mg by mouth daily    Chronic lymphoid leukemia (H)       lidocaine-prilocaine cream    EMLA    30 g    Apply topically as needed for moderate pain Apply to port site 1 hour before chemotherapy appointment    Chronic lymphoid leukemia (H)       VITAMIN D3 PO      Take by mouth daily Reported on 5/1/2017    Chronic lymphoid leukemia (H)

## 2017-09-01 NOTE — PROGRESS NOTES
Hand hygiene performed: yes   Mask donned by caregiver: yes   Site prepped with CHG: yes   Labs drawn: no   Dressing applied using aseptic technique: yes   Patients left-sided port accessed using non-coring, 22 gauge, 3/4inch needle. Port accessed per facility protocol. Port flushed easily, blood return noted.  No signs and symptoms of infection or infiltration.  Port flushed 10 mLs Normal Saline then Heparin 5mLs of 100 unit/mL.  Needle removed, small dressing applied.  Patient declined AVS. Patient discharged with no complaints.  Lauren Pipkin, RN

## 2017-09-25 ENCOUNTER — OFFICE VISIT (OUTPATIENT)
Dept: FAMILY MEDICINE | Facility: OTHER | Age: 77
End: 2017-09-25
Attending: FAMILY MEDICINE
Payer: COMMERCIAL

## 2017-09-25 VITALS
BODY MASS INDEX: 20.19 KG/M2 | OXYGEN SATURATION: 97 % | DIASTOLIC BLOOD PRESSURE: 60 MMHG | TEMPERATURE: 98.6 F | WEIGHT: 100.13 LBS | SYSTOLIC BLOOD PRESSURE: 132 MMHG | HEIGHT: 59 IN | HEART RATE: 90 BPM

## 2017-09-25 DIAGNOSIS — M46.1 SACROILIITIS (H): ICD-10-CM

## 2017-09-25 DIAGNOSIS — Z23 NEED FOR PROPHYLACTIC VACCINATION AND INOCULATION AGAINST INFLUENZA: Primary | ICD-10-CM

## 2017-09-25 PROCEDURE — 99213 OFFICE O/P EST LOW 20 MIN: CPT | Performed by: FAMILY MEDICINE

## 2017-09-25 PROCEDURE — 90662 IIV NO PRSV INCREASED AG IM: CPT | Performed by: FAMILY MEDICINE

## 2017-09-25 PROCEDURE — 99212 OFFICE O/P EST SF 10 MIN: CPT

## 2017-09-25 PROCEDURE — G0008 ADMIN INFLUENZA VIRUS VAC: HCPCS | Performed by: FAMILY MEDICINE

## 2017-09-25 ASSESSMENT — ANXIETY QUESTIONNAIRES
5. BEING SO RESTLESS THAT IT IS HARD TO SIT STILL: NOT AT ALL
1. FEELING NERVOUS, ANXIOUS, OR ON EDGE: NOT AT ALL
6. BECOMING EASILY ANNOYED OR IRRITABLE: NOT AT ALL
IF YOU CHECKED OFF ANY PROBLEMS ON THIS QUESTIONNAIRE, HOW DIFFICULT HAVE THESE PROBLEMS MADE IT FOR YOU TO DO YOUR WORK, TAKE CARE OF THINGS AT HOME, OR GET ALONG WITH OTHER PEOPLE: NOT DIFFICULT AT ALL
2. NOT BEING ABLE TO STOP OR CONTROL WORRYING: NOT AT ALL
4. TROUBLE RELAXING: NOT AT ALL
GAD7 TOTAL SCORE: 0
3. WORRYING TOO MUCH ABOUT DIFFERENT THINGS: NOT AT ALL
7. FEELING AFRAID AS IF SOMETHING AWFUL MIGHT HAPPEN: NOT AT ALL

## 2017-09-25 ASSESSMENT — PATIENT HEALTH QUESTIONNAIRE - PHQ9: SUM OF ALL RESPONSES TO PHQ QUESTIONS 1-9: 1

## 2017-09-25 ASSESSMENT — PAIN SCALES - GENERAL: PAINLEVEL: NO PAIN (1)

## 2017-09-25 NOTE — NURSING NOTE
"Chief Complaint   Patient presents with     Back Pain       Initial /72 (BP Location: Right arm, Patient Position: Chair, Cuff Size: Adult Regular)  Pulse 90  Temp 98.6  F (37  C)  Ht 4' 11\" (1.499 m)  Wt 100 lb 2 oz (45.4 kg)  SpO2 97%  BMI 20.22 kg/m2 Estimated body mass index is 20.22 kg/(m^2) as calculated from the following:    Height as of this encounter: 4' 11\" (1.499 m).    Weight as of this encounter: 100 lb 2 oz (45.4 kg).  Medication Reconciliation: complete     Virginia Deluca      "

## 2017-09-25 NOTE — MR AVS SNAPSHOT
After Visit Summary   9/25/2017    Jennifer Barajas    MRN: [id number]           Patient Information     Date Of Birth          1940        Visit Information        Provider Department      9/25/2017 11:15 AM Zheng De La Rosa MD [facility]        Today's Diagnoses     Need for prophylactic vaccination and inoculation against influenza    -  1    Sacroiliitis (H)          Care Instructions      Sacroiliitis  The sacrum is the triangle-shaped bone at the base of the spine. It is linked to the other pelvis bones by the sacroiliac joints, also called SI joints. Sacroiliitis is when one or both SI joints are hurt or inflamed. It can make small movements of the lower back and pelvis very painful.        This condition has been linked to other diseases. They include ankylosing spondylitis, rheumatoid arthritis, psoriasis, and Crohn s disease or colitis. Symptoms may include pain or stiffness in the hips, lower back, thighs, or buttocks. Pain occurs most often in the morning or after sitting for long periods of time. The pain may get worse when you walk. The swinging motion of the hips strains the SI joints.  Sacroiliitis is caused by many factors such as:    Heavy lifting, especially if not done the right way    Severe injury, such as a fall or car accident    Osteoarthritis    Pregnancy    Infection of the joint  This condition is hard to diagnose. It may be confused with other causes of low back pain. To confirm the diagnosis, you may be given a shot of numbing medicine in the SI joint. Treatment includes rest, physical therapy, and anti-inflammatory medicines. If another health problem is the cause, then that must also be treated. More testing may be needed if your symptoms don t get better.  Home care    If your healthcare provider has prescribed medicines, take all of them as directed.    You may use over-the-counter pain medicine to control pain, unless another medicine was prescribed.  If prednisone was prescribed, don t use NSAIDs, or nonsteroidal anti-inflammatory drugs, such as ibuprofen or naproxen. Talk with your provider before using this medicine if you have chronic liver or kidney disease or ever had a stomach ulcer or GI bleeding.    If you were referred to physical therapy, make an appointment. Be sure to do any prescribed exercises.    Don t smoke. Smoking reduces blood flow to the inflamed area. This makes it harder to treat.  Follow-up care  Follow up with your healthcare provider, or as advised.  If you had an X-ray or an MRI, you will be notified of any new findings that may affect your care.  When to seek medical advice  Contact your healthcare provider right away if any of these occur:    Increasing low back pain    Inflammation of the eyes    Skin rash or redness    Weakness or numbness in one or both legs    Loss of bowel or bladder control    Numbness in the groin area  Date Last Reviewed: 11/24/2015 2000-2017 [employer]. [address] [place] [address]. All rights reserved. This information is not intended as a substitute for professional medical care. Always follow your healthcare professional's instructions.                Follow-ups after your visit        Your next 10 appointments already scheduled     Oct 05, 2017  9:00 AM CDT   Level O with  INF RM 3302   [facility]    [address] [place] [address]   [phone]            Oct 05, 2017  9:30 AM CDT   (Arrive by 9:15 AM)   Return Visit with Yaneli Gray NP   [facility] [place] ([facility] )    [address] [place] [address]   [phone]              Who to contact     If you have questions or need follow up information about today's clinic visit or your schedule please contact [facility] directly at [phone].  Normal or non-critical lab and imaging results will be  "communicated to you by Quikeyhart, letter or phone within 4 business days after the clinic has received the results. If you do not hear from us within 7 days, please contact the clinic through [employer] or phone. If you have a critical or abnormal lab result, we will notify you by phone as soon as possible.  Submit refill requests through [employer] or call your pharmacy and they will forward the refill request to us. Please allow 3 business days for your refill to be completed.          Additional Information About Your Visit        L[employer] Information     [employer] gives you secure access to your electronic health record. If you see a primary care provider, you can also send messages to your care team and make appointments. If you have questions, please call your primary care clinic.  If you do not have a primary care provider, please call [phone] and they will assist you.        Care EveryWhere ID     This is your Care EveryWhere ID. This could be used by other organizations to access your [place] medical records  [id number]        Your Vitals Were     Pulse Temperature Height Pulse Oximetry BMI (Body Mass Index)       90 98.6  F (37  C) 4' 11\" (1.499 m) 97% 20.22 kg/m2        Blood Pressure from Last 3 Encounters:   09/25/17 132/60   09/01/17 130/71   08/03/17 158/62    Weight from Last 3 Encounters:   09/25/17 100 lb 2 oz (45.4 kg)   09/01/17 103 lb 9.6 oz (47 kg)   07/06/17 103 lb 9.6 oz (47 kg)              We Performed the Following     HC FLU VACCINE, INCREASED ANTIGEN, PRESV FREE     Vaccine Administration, Initial [62029]        Primary Care Provider Office Phone # Fax #    Zheng De La Rosa [phone]       [facility] [address]  [place] [address]        Equal Access to Services     RANDALL BENAVIDES : Hadii charity mckenzie Sohosea, waaxda italoqadaha, qaybta kaalmada jefry, apolinar thacker. So [facility] [phone].    ATENCIÓN: Si daniela kessler " darden disposición servicios gratuitos de asistencia lingüística. Alin linda [phone].    We comply with applicable federal civil rights laws and [place] laws. We do not discriminate on the basis of race, color, national origin, age, disability sex, sexual orientation or gender identity.            Thank you!     Thank you for choosing [facility]  for your care. Our goal is always to provide you with excellent care. Hearing back from our patients is one way we can continue to improve our services. Please take a few minutes to complete the written survey that you may receive in the mail after your visit with us. Thank you!             Your Updated Medication List - Protect others around you: Learn how to safely use, store and throw away your medicines at www.disposemymeds.org.          This list is accurate as of: 9/25/17 11:48 AM.  Always use your most recent med list.                   Brand Name Dispense Instructions for use Diagnosis    alendronate 70 MG tablet    FOSAMAX    12 tablet    TAKE 1 TABLET BY MOUTH EVERY WEEK IN THE MORNIN G; AT LEAST 30 MINUTES B EFORE THE FIRST FOOD, BE VERAGE OR MEDICATION O F THE    Osteoporosis       calcium gluconate 500 MG Tabs tablet      Take 1,200 mg by mouth daily    Chronic lymphoid leukemia (H)       lidocaine-prilocaine cream    EMLA    30 g    Apply topically as needed for moderate pain Apply to port site 1 hour before chemotherapy appointment    Chronic lymphoid leukemia (H)       VITAMIN D3 PO      Take by mouth daily Reported on 5/1/2017    Chronic lymphoid leukemia (H)

## 2017-09-25 NOTE — PATIENT INSTRUCTIONS
Sacroiliitis  The sacrum is the triangle-shaped bone at the base of the spine. It is linked to the other pelvis bones by the sacroiliac joints, also called SI joints. Sacroiliitis is when one or both SI joints are hurt or inflamed. It can make small movements of the lower back and pelvis very painful.        This condition has been linked to other diseases. They include ankylosing spondylitis, rheumatoid arthritis, psoriasis, and Crohn s disease or colitis. Symptoms may include pain or stiffness in the hips, lower back, thighs, or buttocks. Pain occurs most often in the morning or after sitting for long periods of time. The pain may get worse when you walk. The swinging motion of the hips strains the SI joints.  Sacroiliitis is caused by many factors such as:    Heavy lifting, especially if not done the right way    Severe injury, such as a fall or car accident    Osteoarthritis    Pregnancy    Infection of the joint  This condition is hard to diagnose. It may be confused with other causes of low back pain. To confirm the diagnosis, you may be given a shot of numbing medicine in the SI joint. Treatment includes rest, physical therapy, and anti-inflammatory medicines. If another health problem is the cause, then that must also be treated. More testing may be needed if your symptoms don t get better.  Home care    If your healthcare provider has prescribed medicines, take all of them as directed.    You may use over-the-counter pain medicine to control pain, unless another medicine was prescribed. If prednisone was prescribed, don t use NSAIDs, or nonsteroidal anti-inflammatory drugs, such as ibuprofen or naproxen. Talk with your provider before using this medicine if you have chronic liver or kidney disease or ever had a stomach ulcer or GI bleeding.    If you were referred to physical therapy, make an appointment. Be sure to do any prescribed exercises.    Don t smoke. Smoking reduces blood flow to the inflamed  area. This makes it harder to treat.  Follow-up care  Follow up with your healthcare provider, or as advised.  If you had an X-ray or an MRI, you will be notified of any new findings that may affect your care.  When to seek medical advice  Contact your healthcare provider right away if any of these occur:    Increasing low back pain    Inflammation of the eyes    Skin rash or redness    Weakness or numbness in one or both legs    Loss of bowel or bladder control    Numbness in the groin area  Date Last Reviewed: 11/24/2015 2000-2017 [employer]. [address] [place] [address]. All rights reserved. This information is not intended as a substitute for professional medical care. Always follow your healthcare professional's instructions.

## 2017-09-25 NOTE — PROGRESS NOTES
"  SUBJECTIVE:                                                    Jennifer Barajas is a 76 year old female who presents to clinic today for the following health issues:      Back Pain       Duration: 1 week        Specific cause: none    Description:   Location of pain: low back left  Character of pain: sharp, stabbing   Pain radiation:radiates into the left buttocks and radiates into the left leg  New numbness or weakness in legs, not attributed to pain:  no     Intensity: Currently 0/10, At its worst 8/10    History:   Pain interferes with job: No  History of back problems: nov 2016 osteoporosis/ MRI  Any previous MRI or X-rays: Yes- at [facility].  Date nov 2016  Sees a specialist for back pain:  No  Therapies tried without relief: acetaminophen (Tylenol), rest and stretch    Alleviating factors:   Improved by: acetaminophen (Tylenol), rest and stretch      Precipitating factors:  Worsened by: getting out of bed in the morning is the worst    Functional and Psychosocial Screen ([facility] ST[facility] Back):      Not performed today      Pain worse in the morning  and then after get out of bed it is much better  Gets worse after supper - increase pain ans stiffness.   H/o scoliosis       Problem list and histories reviewed & adjusted, as indicated.  Additional history: as documented    Labs reviewed in EPIC    ROS:  C: NEGATIVE for fever, chills, change in weight  R: NEGATIVE for significant cough or SOB  CV: NEGATIVE for chest pain, palpitations or peripheral edema    OBJECTIVE:                                                    /60 (BP Location: Right arm, Patient Position: Chair, Cuff Size: Adult Regular)  Pulse 90  Temp 98.6  F (37  C)  Ht 4' 11\" (1.499 m)  Wt 100 lb 2 oz (45.4 kg)  SpO2 97%  BMI 20.22 kg/m2  Body mass index is 20.22 kg/(m^2).   GENERAL: healthy, alert, well nourished, well hydrated, no distress  BACK: no CVA tenderness, mild very low  paralumbar tenderness  Tender mostly over Right  SI joint. No " pain over greater trochanter on that side.  Has chronic pelvic til and scoliosis on low lumbar        ASSESSMENT/PLAN:                                                    (Z23) Need for prophylactic vaccination and inoculation against influenza  (primary encounter diagnosis)  Comment: shot given   Plan: Vaccine Administration, Initial [98295], HC FLU        VACCINE, INCREASED ANTIGEN, PRESV FREE            (M46.1) Sacroiliitis (H)  Comment: Right side   Plan: Discussed.  Discussed in length conservative measures of OTC medications for pain, Ice/Heat, elevation and the concept of R.I.C.E.. Continue behavioral changes and proper body mechanics to allow for healing. Follow up as directed.   Discussed behavioral changes and proper body mechanics needed to help control patient's back pain.   Call if not improving- add PT. Symptomatic treatment was discussed along when patient should call and/or come back into the clinic or go to ER/Urgent care. All questions answered.           See Patient Instructions    Zheng De La Rosa MD  [facility] [place]      Injectable Influenza Immunization Documentation    1.  Is the person to be vaccinated sick today?   No    2. Does the person to be vaccinated have an allergy to a component   of the vaccine?   No    3. Has the person to be vaccinated ever had a serious reaction   to influenza vaccine in the past?   No    4. Has the person to be vaccinated ever had Guillain-Barré syndrome?   No    Form completed by Virginia Deluca

## 2017-09-26 ASSESSMENT — ANXIETY QUESTIONNAIRES: GAD7 TOTAL SCORE: 0

## 2017-10-05 ENCOUNTER — INFUSION THERAPY VISIT (OUTPATIENT)
Dept: INFUSION THERAPY | Facility: OTHER | Age: 77
End: 2017-10-05
Attending: INTERNAL MEDICINE
Payer: MEDICARE

## 2017-10-05 ENCOUNTER — ONCOLOGY VISIT (OUTPATIENT)
Dept: ONCOLOGY | Facility: OTHER | Age: 77
End: 2017-10-05
Attending: INTERNAL MEDICINE
Payer: MEDICARE

## 2017-10-05 VITALS
DIASTOLIC BLOOD PRESSURE: 67 MMHG | BODY MASS INDEX: 20.66 KG/M2 | SYSTOLIC BLOOD PRESSURE: 145 MMHG | HEIGHT: 59 IN | TEMPERATURE: 96.9 F | WEIGHT: 102.5 LBS | OXYGEN SATURATION: 98 % | RESPIRATION RATE: 16 BRPM | HEART RATE: 77 BPM

## 2017-10-05 VITALS
HEIGHT: 59 IN | RESPIRATION RATE: 16 BRPM | HEART RATE: 77 BPM | DIASTOLIC BLOOD PRESSURE: 67 MMHG | OXYGEN SATURATION: 98 % | SYSTOLIC BLOOD PRESSURE: 145 MMHG | WEIGHT: 102.5 LBS | TEMPERATURE: 96.9 F | BODY MASS INDEX: 20.66 KG/M2

## 2017-10-05 DIAGNOSIS — C91.10 CHRONIC LYMPHOCYTIC LEUKEMIA (H): Primary | ICD-10-CM

## 2017-10-05 DIAGNOSIS — C91.10 CHRONIC LYMPHOCYTIC LEUKEMIA (H): ICD-10-CM

## 2017-10-05 DIAGNOSIS — C91.11 CHRONIC LYMPHOCYTIC LEUKEMIA IN REMISSION (H): Primary | ICD-10-CM

## 2017-10-05 LAB
ALBUMIN SERPL-MCNC: 4 G/DL (ref 3.4–5)
ALP SERPL-CCNC: 71 U/L (ref 40–150)
ALT SERPL W P-5'-P-CCNC: 32 U/L (ref 0–50)
ANION GAP SERPL CALCULATED.3IONS-SCNC: 7 MMOL/L (ref 3–14)
AST SERPL W P-5'-P-CCNC: 30 U/L (ref 0–45)
BASOPHILS # BLD AUTO: 0 10E9/L (ref 0–0.2)
BASOPHILS NFR BLD AUTO: 0.3 %
BILIRUB SERPL-MCNC: 0.5 MG/DL (ref 0.2–1.3)
BUN SERPL-MCNC: 17 MG/DL (ref 7–30)
CALCIUM SERPL-MCNC: 8.7 MG/DL (ref 8.5–10.1)
CHLORIDE SERPL-SCNC: 102 MMOL/L (ref 94–109)
CO2 SERPL-SCNC: 29 MMOL/L (ref 20–32)
CREAT SERPL-MCNC: 0.77 MG/DL (ref 0.52–1.04)
DIFFERENTIAL METHOD BLD: NORMAL
EOSINOPHIL # BLD AUTO: 0.2 10E9/L (ref 0–0.7)
EOSINOPHIL NFR BLD AUTO: 2.4 %
ERYTHROCYTE [DISTWIDTH] IN BLOOD BY AUTOMATED COUNT: 14.2 % (ref 10–15)
GFR SERPL CREATININE-BSD FRML MDRD: 73 ML/MIN/1.7M2
GLUCOSE SERPL-MCNC: 88 MG/DL (ref 70–99)
HCT VFR BLD AUTO: 41 % (ref 35–47)
HGB BLD-MCNC: 14 G/DL (ref 11.7–15.7)
IMM GRANULOCYTES # BLD: 0 10E9/L (ref 0–0.4)
IMM GRANULOCYTES NFR BLD: 0.3 %
LDH SERPL L TO P-CCNC: 208 U/L (ref 81–234)
LYMPHOCYTES # BLD AUTO: 1.5 10E9/L (ref 0.8–5.3)
LYMPHOCYTES NFR BLD AUTO: 20.8 %
MCH RBC QN AUTO: 29.9 PG (ref 26.5–33)
MCHC RBC AUTO-ENTMCNC: 34.1 G/DL (ref 31.5–36.5)
MCV RBC AUTO: 88 FL (ref 78–100)
MONOCYTES # BLD AUTO: 0.6 10E9/L (ref 0–1.3)
MONOCYTES NFR BLD AUTO: 8.5 %
NEUTROPHILS # BLD AUTO: 5 10E9/L (ref 1.6–8.3)
NEUTROPHILS NFR BLD AUTO: 67.7 %
NRBC # BLD AUTO: 0 10*3/UL
NRBC BLD AUTO-RTO: 0 /100
PLATELET # BLD AUTO: 235 10E9/L (ref 150–450)
POTASSIUM SERPL-SCNC: 3.8 MMOL/L (ref 3.4–5.3)
PROT SERPL-MCNC: 7.1 G/DL (ref 6.8–8.8)
RBC # BLD AUTO: 4.68 10E12/L (ref 3.8–5.2)
SODIUM SERPL-SCNC: 138 MMOL/L (ref 133–144)
WBC # BLD AUTO: 7.4 10E9/L (ref 4–11)

## 2017-10-05 PROCEDURE — 36415 COLL VENOUS BLD VENIPUNCTURE: CPT | Mod: ZL | Performed by: NURSE PRACTITIONER

## 2017-10-05 PROCEDURE — 99213 OFFICE O/P EST LOW 20 MIN: CPT | Performed by: NURSE PRACTITIONER

## 2017-10-05 PROCEDURE — 96523 IRRIG DRUG DELIVERY DEVICE: CPT

## 2017-10-05 PROCEDURE — 99212 OFFICE O/P EST SF 10 MIN: CPT

## 2017-10-05 PROCEDURE — 80053 COMPREHEN METABOLIC PANEL: CPT | Mod: ZL | Performed by: NURSE PRACTITIONER

## 2017-10-05 PROCEDURE — 83615 LACTATE (LD) (LDH) ENZYME: CPT | Mod: ZL | Performed by: NURSE PRACTITIONER

## 2017-10-05 PROCEDURE — 85025 COMPLETE CBC W/AUTO DIFF WBC: CPT | Mod: ZL | Performed by: NURSE PRACTITIONER

## 2017-10-05 PROCEDURE — 25000128 H RX IP 250 OP 636: Performed by: INTERNAL MEDICINE

## 2017-10-05 RX ORDER — HEPARIN SODIUM (PORCINE) LOCK FLUSH IV SOLN 100 UNIT/ML 100 UNIT/ML
500 SOLUTION INTRAVENOUS EVERY 8 HOURS
Status: DISCONTINUED | OUTPATIENT
Start: 2017-10-05 | End: 2017-10-05 | Stop reason: HOSPADM

## 2017-10-05 RX ORDER — LIDOCAINE/PRILOCAINE 2.5 %-2.5%
CREAM (GRAM) TOPICAL PRN
Qty: 30 G | Refills: 1 | Status: SHIPPED | OUTPATIENT
Start: 2017-10-05 | End: 2023-09-21

## 2017-10-05 RX ORDER — HEPARIN SODIUM (PORCINE) LOCK FLUSH IV SOLN 100 UNIT/ML 100 UNIT/ML
500 SOLUTION INTRAVENOUS EVERY 8 HOURS
Status: CANCELLED
Start: 2017-10-05

## 2017-10-05 RX ADMIN — SODIUM CHLORIDE, PRESERVATIVE FREE 500 UNITS: 5 INJECTION INTRAVENOUS at 08:59

## 2017-10-05 ASSESSMENT — PAIN SCALES - GENERAL: PAINLEVEL: NO PAIN (0)

## 2017-10-05 ASSESSMENT — PATIENT HEALTH QUESTIONNAIRE - PHQ9: SUM OF ALL RESPONSES TO PHQ QUESTIONS 1-9: 0

## 2017-10-05 NOTE — MR AVS SNAPSHOT
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 Please come 30 minutes prior for lab work.Your prescription has been sent to:   [id number] - [place], [place] - [address]  [address]  [place] [address]  Phone: [phone] Fax: [phone]    [id number] - [place], [place] - [address] AT [facility] [address]  [address]  [place] [address]  Phone: [phone] Fax: [phone]   When you are in need of a refill, please call your pharmacy and they will send us a request. If you have any questions please call [phone]  Other instructions:  none          Follow-ups after your visit        Your next 10 appointments already scheduled     Nov 02, 2017  9:00 AM CDT   Level O with HC INF RM 3302   [facility] [place] ([facility] [place] )    [address]  [place] [address]   [phone]            Nov 30, 2017  9:00 AM CST   Level O with HC INF RM 3308   [facility] [place] ([facility] [place] )    [address]  [place] [address]   [phone]            Dec 28, 2017  9:00 AM CST   Level O with HC INF RM 3306   [facility] [place] ([facility] [place] )    [address]  [place] [address]   [phone]            Feb 01, 2018  9:30 AM CST   Level O with HC INF RM 3306   [facility] [place] ([facility] [place] )    [address]  [place] [address]   [phone]            Feb 01, 2018 10:00 AM CST   (Arrive by 9:45 AM)   Return Visit with [facility] [place] ([facility] [place] )    [address] [place] [address]   [phone]              Who to contact     If you have questions or need follow up information about today's clinic visit or your schedule please contact [facility] directly at [phone].  Normal or non-critical lab and imaging results will be communicated to you by MyChart, letter or phone within 4 business days after the clinic has received the results. If you do not hear from us within 7 days, please contact the clinic through [employer] or phone. If you have a critical or abnormal lab result, we will notify you by phone as soon as possible.  Submit refill requests through [employer] or call your pharmacy and they will forward the refill request to us. Please allow 3 business days for your refill to be completed.          Additional Information About Your Visit        MyChart Information     [employer] gives you secure access to your electronic health record. If you see a primary care provider, you can also send messages to your care team and make appointments. If you have questions, please call your primary care clinic.  If you do not have a primary care provider, please call [phone] and they will assist you.        Care EveryWhere ID     This is your Care EveryWhere ID. This could be used by other organizations to access your [facility] medical records  [id number]        Your Vitals Were     Pulse Temperature Respirations Height Pulse Oximetry BMI (Body Mass Index)    77 96.9  F (36.1  C) (Tympanic) 16 1.499 m (4' 11.02\") 98% 20.69 kg/m2       Blood Pressure from Last 3 Encounters:   10/05/17 145/67   10/05/17 145/67   09/25/17 132/60    Weight from Last 3 Encounters:   10/05/17 46.5 kg (102 lb 8 oz)   10/05/17 46.5 kg (102 lb 8 oz)   09/25/17 45.4 kg (100 lb 2 oz)              Today, you had the following     No orders found for display         Where to get your medicines      These medications were sent " to [id number] - [place], [place] - [address]  [address] [facility], [place] [address]     Phone:  [phone]     lidocaine-prilocaine cream          Primary Care Provider Office Phone # Fax #    Zheng De La Rosa [phone]       [facility] [address]  [place] [address]        Equal Access to Services     [facility] Center[facility]LECIO : Hadii aad ku hadasho Soomaali, waaxda luqadaha, qaybta kaalmada adeegyada, waxay idiin hayaan adeeg kharash la'aan ah. So [facility] [phone].    ATENCIÓN: Si nick elder, tiene a darden disposición servicios gratuitos de asistencia lingüística. Llame al [phone].    We comply with applicable federal civil rights laws and [place] laws. We do not discriminate on the basis of race, color, national origin, age, disability, sex, sexual orientation, or gender identity.            Thank you!     Thank you for choosing [facility]  for your care. Our goal is always to provide you with excellent care. Hearing back from our patients is one way we can continue to improve our services. Please take a few minutes to complete the written survey that you may receive in the mail after your visit with us. Thank you!             Your Updated Medication List - Protect others around you: Learn how to safely use, store and throw away your medicines at www.disposemymeds.org.          This list is accurate as of: 10/5/17 10:02 AM.  Always use your most recent med list.                   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

## 2017-10-05 NOTE — PATIENT INSTRUCTIONS
We would like to see you back in 4 months. Please come 30 minutes prior for lab work.Your prescription has been sent to:   [id number] - [place] - [address]  [address]  [place] [address]  Phone: [phone] Fax: [phone]    [facility] - [place] - [address] AT [facility] [address]  [address]  [place] [address]  Phone: [phone] Fax: [phone]   When you are in need of a refill, please call your pharmacy and they will send us a request. If you have any questions please call [phone]  Other instructions:  none

## 2017-10-05 NOTE — MR AVS SNAPSHOT
After Visit Summary   10/5/2017    Jennifer Barajas    MRN: [id number]           Patient Information     Date Of Birth          1940        Visit Information        Provider Department      10/5/2017 9:00 AM [facility]        Today's Diagnoses     Chronic lymphocytic leukemia (H)    -  1      Care Instructions    We will see you back as planned.  If you have any questions, please call [phone].  If you need a refill, please contact your pharmacy.            Follow-ups after your visit        Your next 10 appointments already scheduled     Oct 05, 2017  9:30 AM CDT   (Arrive by 9:15 AM)   Return Visit with Yaneli Gray NP   [facility] [place] ([facility] [place] )    [address]  [place] [address] [phone]              Who to contact     If you have questions or need follow up information about today's clinic visit or your schedule please contact [facility] directly at [phone].  Normal or non-critical lab and imaging results will be communicated to you by [employer], letter or phone within 4 business days after the clinic has received the results. If you do not hear from us within 7 days, please contact the clinic through urturnt or phone. If you have a critical or abnormal lab result, we will notify you by phone as soon as possible.  Submit refill requests through [employer] or call your pharmacy and they will forward the refill request to us. Please allow 3 business days for your refill to be completed.          Additional Information About Your Visit        On[employer]Go Platform[employer] Information     [employer] gives you secure access to your electronic health record. If you see a primary care provider, you can also send messages to your care team and make appointments. If you have questions, please call your primary care clinic.  If you do not have a primary care provider, please call [phone] and they will assist you.        Care  "EveryWhere ID     This is your Care EveryWhere ID. This could be used by other organizations to access your [facility] medical records  [id number]        Your Vitals Were     Pulse Temperature Respirations Height Pulse Oximetry BMI (Body Mass Index)    77 96.9  F (36.1  C) (Tympanic) 16 1.499 m (4' 11\") 98% 20.7 kg/m2       Blood Pressure from Last 3 Encounters:   10/05/17 145/67   09/25/17 132/60   09/01/17 130/71    Weight from Last 3 Encounters:   10/05/17 46.5 kg (102 lb 8 oz)   09/25/17 45.4 kg (100 lb 2 oz)   09/01/17 47 kg (103 lb 9.6 oz)              We Performed the Following     CBC with platelets differential     Comprehensive metabolic panel     Lactate Dehydrogenase        Primary Care Provider Office Phone # Fax #    Zheng De La Rosa [phone]       [facility] [address]  [place] [address]        Equal Access to Services     [facility] SystemMELECIO : Hadii aad ku hadasho Soomaali, waaxda luqadaha, qaybta kaalmada adeegyada, waxay idiin hayaan adeeg kharasandra la'braulion . So [facility] [phone].    ATENCIÓN: Si habla español, tiene a darden disposición servicios gratuitos de asistencia lingüística. Alin al [phone].    We comply with applicable federal civil rights laws and [place] laws. We do not discriminate on the basis of race, color, national origin, age, disability, sex, sexual orientation, or gender identity.            Thank you!     Thank you for choosing [facility] [place]  for your care. Our goal is always to provide you with excellent care. Hearing back from our patients is one way we can continue to improve our services. Please take a few minutes to complete the written survey that you may receive in the mail after your visit with us. Thank you!             Your Updated Medication List - Protect others around you: Learn how to safely use, store and throw away your medicines at www.disposemymeds.org.          This list is accurate as of: 10/5/17  9:11 AM.  Always " use your most recent med list.                   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

## 2017-10-05 NOTE — PROGRESS NOTES
Patients left sided port accessed using non-coring, 19 gauge, 3/4 inch needle. Port accessed per facility protocol.  Hand hygiene performed: yes   Mask donned by caregiver: yes Site prepped with CHG: yes Labs drawn: yes Dressing applied using aseptic technique: yes Port flushed easily, without resistance. Flushed with 10 cc's normal saline.   Immediate blood return noted. 10 cc blood discarded.  2 vials blood draw and sent to lab for results.  Port flushed with 20 cc 0.9% normal saline and 5 cc heparinized saline.  Needle removed intact, sterile dressing applied.  Slight pressure applied for 30 seconds.   Patient tolerated port flush well, denies pain nor discomfort at this time. Patient instructed to leave dressing intact for a minimum of one hour, and to call with questions or concerns.  Patient states understanding and is in agreement with this plan. Copy of appointments, and after visit summary (AVS) given to patient.  Patient discharged ambulatory. Digna Chowdary RN

## 2017-10-05 NOTE — PATIENT INSTRUCTIONS
We will see you back as planned.  If you have any questions, please call [phone].  If you need a refill, please contact your pharmacy.

## 2017-10-05 NOTE — PROGRESS NOTES
Oncology Follow-up Visit:  October 5, 2017    Reason for Visit:  Patient presents with:  RECHECK: follow up- CLL  Medication Request: pt would like refill of Emla cream today      Nursing Note and documentation reviewed: yes    HPI:  This is a 76-year-old female patient who presents to the oncology/hematology clinic today in follow up of CLL diagnosed December 2011. She completed 6 cycles of Bendamustine July 2, 2015.  She presents to the clinic today stating she is feeling good.   she again admits to increased anxiety prior to this appointment.  She states she was seen by Dr. De La Rosa about a week and a half ago for some discomfort to the left hip and he felt this was related to her SI joint.  She is using Aleve as needed and denies any discomfort now.  She denies any issues with fevers, night sweats, weight changes and states her appetite is good.  In discussing prevention measures, last mammogram was completed April 2016 and she states she was informed this was not due for 2 years from her last one.  In addition, she was due for colonoscopy in 2015 and has declined having this completed.      Oncologic History:   Jennifer was diagnosed with CLL in December 2011 after her primary care provider noted an elevated WBC on her annual exam. A peripheral blood flow for cytometry revealed she was CD5 positive, CD10 negative, consistent with CLL/SLL. Staging studies were completed and CT scan showed no evidence of lymphadenopathy or splenomegaly. She was asymptomatic with essentially stable lymphocyte count and followed with surveillance.      With patient's follow up in October 2014, hemoglobin was noted to be 10.7 with Hematocrit 30.0 and platelets 137,000. She was subsequently seen again 2 months later with further drop in hemoglobin to 8.6 with hematocrit 26.1 and platelets 140,000 with a white blood cell count of 13.5 and ANC 1.2, absolute lymphocytes were 12.1. A PET scan was completed on 12/11/2014 which did show mildly  enlarged axillary lymph nodes with very mild abnormal hypermetabolism of SUV of 2 or less. Bone marrow aspiration and biopsy was completed which revealed infiltration of CLL with 90% of cells confirmed to be CLL via flow morphologically. Patient was staged at least stage III CLL based on her anemia. The plan was to proceed with chemotherapy consisting of Rituxan and Fludara. Patient did experience hypersensitivity reaction with cycle 2 Rituxan therapy experiencing hypotension. This was discontinued and chemotherapy regime was changed to Bendamustine day 1 and day 2 every 28 days. She was started on 2/12/2015 and completed therapy in July 2015.      Current Chemo Regime/TX:  n/a  Current Cycle:  n/a  # of completed cycles:  n/a      Previous treatment: Rituxan/Fludara completing one full cycle and experienced hypersensitivity with Rituxan with second cycle;  Bendamustine 50 mg per metered squared days 1 and 2 every 28 days x6 cycles completed 7/2/15    Past Medical History:   Diagnosis Date     Chronic lymphoid leukemia (H) 2/28/2012     Diverticulosis of colon (without mention of hemorrhage) 11/29/2010     Family history of ischemic heart disease 11/29/2010     Family history of malignant neoplasm of gastrointestinal tract 10/14/2002     Non-toxic multinodular goiter 11/8/2016     Osteoporosis, unspecified 11/29/2010     Other and unspecified hyperlipidemia 11/28/2011     Viral warts, unspecified 11/28/2011       Past Surgical History:   Procedure Laterality Date     ------------OTHER-------------      elbow repair     COLONOSCOPY  01/12/2010    repeat in 2015     COLONOSCOPY       COLONOSCOPY       INSERT PORT VASCULAR ACCESS N/A 1/5/2015    Procedure: INSERT PORT VASCULAR ACCESS;  Surgeon: Linda Oliver MD;  Location: [facility]     ORTHOPEDIC SURGERY  1993    Left elbow repair       Family History   Problem Relation Age of Onset     CANCER Mother 97     colon cancer - cause of death     CANCER Father      DIABETES  Brother      Obesity Brother      Breast Cancer Daughter        Social History     Social History     Marital status:      Spouse name: N/A     Number of children: N/A     Years of education: N/A     Occupational History     [employer]      Social History Main Topics     Smoking status: Never Smoker     Smokeless tobacco: Never Used     Alcohol use No     Drug use: No     Sexual activity: No      Comment: devorced     Other Topics Concern     Parent/Sibling W/ Cabg, Mi Or Angioplasty Before 65f 55m? No      Service No     Blood Transfusions Yes     permits if needed     Caffeine Concern Yes     1 cup     Occupational Exposure No     Hobby Hazards No     Sleep Concern No     Stress Concern No     Weight Concern No     Special Diet No     Back Care No     Exercise No     Seat Belt Yes     Social History Narrative       Current Outpatient Prescriptions   Medication     lidocaine-prilocaine (EMLA) cream     alendronate (FOSAMAX) 70 MG tablet     calcium gluconate 500 MG TABS     Cholecalciferol (VITAMIN D3 PO)     [DISCONTINUED] lidocaine-prilocaine (EMLA) cream     No current facility-administered medications for this visit.      Facility-Administered Medications Ordered in Other Visits   Medication     sodium chloride (PF) 0.9% PF flush 10 mL     heparin 100 UNIT/ML injection 500 Units        Allergies   Allergen Reactions     Sulfa Drugs Rash     Rituxan [Rituximab]      Simvastatin Other (See Comments)     Takes pravastatin at home  Zocor - myalgia       Review Of Systems:  Constitutional: denies fever, weight changes, chills, and night sweats.  Eyes: denies blurred or double vision  Ears/Nose/Throat: denies ear pain, nose problems, difficulty swallowing  Respiratory: denies shortness of breath, cough   Skin: denies rash, lesions  Breast/Chest wall: denies pain, lumps or discharge   Cardiovascular: denies chest pain, palpitations, edema  Gastrointestinal: denies abdominal pain, bloating, nausea,  "vomiting, early satiety  Genitourinary: denies difficulty with urination, blood in urine  Musculoskeletal: see HPI  Neurologic: denies lightheadedness, headaches, numbness or tingling  Psychiatric: denies anxiety, depression  Hematologic/Lymphatic/Immunologic: denies easy bruising, easy bleeding, lumps or bumps noted  Endocrine: Denies increased thirst      Physical Exam:  /67  Pulse 77  Temp 96.9  F (36.1  C) (Tympanic)  Resp 16  Ht 1.499 m (4' 11.02\")  Wt 46.5 kg (102 lb 8 oz)  SpO2 98%  BMI 20.69 kg/m2    GENERAL APPEARANCE: Healthy, thin, alert and in no acute distress.  HEENT: Normocephalic, Sclerae anicteric. Oropharynx without ulcers, lesions, or thrush.  NECK: No asymmetry or masses, no thyromegaly.  LYMPHATICS: No palpable cervical, supraclavicular, axillary, or inguinal nodes   RESP: Lungs clear to auscultation bilaterally, respirations regular and easy  CARDIOVASCULAR: Regular rate and rhythm. Normal S1, S2; no murmur, gallop, or rub.  ABDOMEN: Soft, nontender. Bowel sounds auscultated all 4 quadrants. No palpable organomegaly or masses.  BREAST/Chest wall: not examined  MUSCULOSKELETAL: Extremities without gross deformities noted. No edema of bilateral lower extremities.  SKIN: No suspicious lesions or rashes.  NEURO: Alert and oriented x 3.  Gait steady.  PSYCHIATRIC: Mentation and affect appear normal.  Mood appropriate.    Laboratory:  Results for orders placed or performed in visit on 10/05/17   CBC with platelets differential   Result Value Ref Range    WBC 7.4 4.0 - 11.0 10e9/L    RBC Count 4.68 3.8 - 5.2 10e12/L    Hemoglobin 14.0 11.7 - 15.7 g/dL    Hematocrit 41.0 35.0 - 47.0 %    MCV 88 78 - 100 fl    MCH 29.9 26.5 - 33.0 pg    MCHC 34.1 31.5 - 36.5 g/dL    RDW 14.2 10.0 - 15.0 %    Platelet Count 235 150 - 450 10e9/L    Diff Method Automated Method     % Neutrophils 67.7 %    % Lymphocytes 20.8 %    % Monocytes 8.5 %    % Eosinophils 2.4 %    % Basophils 0.3 %    % Immature " Granulocytes 0.3 %    Nucleated RBCs 0 0 /100    Absolute Neutrophil 5.0 1.6 - 8.3 10e9/L    Absolute Lymphocytes 1.5 0.8 - 5.3 10e9/L    Absolute Monocytes 0.6 0.0 - 1.3 10e9/L    Absolute Eosinophils 0.2 0.0 - 0.7 10e9/L    Absolute Basophils 0.0 0.0 - 0.2 10e9/L    Abs Immature Granulocytes 0.0 0 - 0.4 10e9/L    Absolute Nucleated RBC 0.0    Comprehensive metabolic panel   Result Value Ref Range    Sodium 138 133 - 144 mmol/L    Potassium 3.8 3.4 - 5.3 mmol/L    Chloride 102 94 - 109 mmol/L    Carbon Dioxide 29 20 - 32 mmol/L    Anion Gap 7 3 - 14 mmol/L    Glucose 88 70 - 99 mg/dL    Urea Nitrogen 17 7 - 30 mg/dL    Creatinine 0.77 0.52 - 1.04 mg/dL    GFR Estimate 73 >60 mL/min/1.7m2    GFR Estimate If Black 88 >60 mL/min/1.7m2    Calcium 8.7 8.5 - 10.1 mg/dL    Bilirubin Total 0.5 0.2 - 1.3 mg/dL    Albumin 4.0 3.4 - 5.0 g/dL    Protein Total 7.1 6.8 - 8.8 g/dL    Alkaline Phosphatase 71 40 - 150 U/L    ALT 32 0 - 50 U/L    AST 30 0 - 45 U/L   Lactate Dehydrogenase   Result Value Ref Range    Lactate Dehydrogenase 208 81 - 234 U/L       Imaging Studies:   None for today      ASSESSMENT/PLAN:    #1 CLL/SLL: Diagnosed with CLL/SLL in December 2011. Stage III. She received 6 cycles of bendamustine completing this July 2015 and is now followed with surveillance.  Labs are good with normal LDH and patient is feeling well. She will follow up in 4 months with a CBC, CMP and LDH.     We did discuss prevention measure including mammogram and colonoscopy.  Discussed IFOBT and patient would like to discuss further wit her PCP.     I encouraged patient to call with any questions or concerns.     Yaneli Gray  [facility]

## 2017-10-05 NOTE — NURSING NOTE
"Chief Complaint   Patient presents with     RECHECK     follow up- CLL     Medication Request     pt would like refill of Emla cream today        Initial /67  Pulse 77  Temp 96.9  F (36.1  C) (Tympanic)  Resp 16  Ht 1.499 m (4' 11.02\")  Wt 46.5 kg (102 lb 8 oz)  SpO2 98%  BMI 20.69 kg/m2 Estimated body mass index is 20.69 kg/(m^2) as calculated from the following:    Height as of this encounter: 1.499 m (4' 11.02\").    Weight as of this encounter: 46.5 kg (102 lb 8 oz).  Medication Reconciliation: complete       "

## 2017-10-19 DIAGNOSIS — M81.0 OSTEOPOROSIS, UNSPECIFIED OSTEOPOROSIS TYPE, UNSPECIFIED PATHOLOGICAL FRACTURE PRESENCE: Primary | ICD-10-CM

## 2017-10-19 DIAGNOSIS — M81.0 OSTEOPOROSIS: ICD-10-CM

## 2017-10-19 NOTE — TELEPHONE ENCOUNTER
Fosamax    Last Written Prescription Date: 11/28/16  Last Fill Quantity: 12, # refills: 3  Last Office Visit with [facility], [facility] or [facility] prescribing provider: 9/25/17       DEXA Scan:  Last order of DX HIP/PELVIS/SPINE was found on 11/8/2016 from Office Visit on 11/8/2016     No order of DX HIP/PELVIS/SPINE W LAT FRACTION ANALYSIS is found.       Creatinine   Date Value Ref Range Status   10/05/2017 0.77 0.52 - 1.04 mg/dL Final

## 2017-10-20 RX ORDER — HEPARIN SODIUM (PORCINE) LOCK FLUSH IV SOLN 100 UNIT/ML 100 UNIT/ML
500 SOLUTION INTRAVENOUS EVERY 8 HOURS
Status: CANCELLED
Start: 2017-10-20

## 2017-10-24 RX ORDER — ALENDRONATE SODIUM 70 MG/1
TABLET ORAL
Qty: 12 TABLET | Refills: 3 | Status: SHIPPED | OUTPATIENT
Start: 2017-10-24 | End: 2018-11-27

## 2017-11-02 ENCOUNTER — INFUSION THERAPY VISIT (OUTPATIENT)
Dept: INFUSION THERAPY | Facility: OTHER | Age: 77
End: 2017-11-02
Attending: INTERNAL MEDICINE
Payer: MEDICARE

## 2017-11-02 VITALS
HEIGHT: 59 IN | WEIGHT: 102 LBS | BODY MASS INDEX: 20.56 KG/M2 | RESPIRATION RATE: 16 BRPM | DIASTOLIC BLOOD PRESSURE: 71 MMHG | TEMPERATURE: 97.9 F | OXYGEN SATURATION: 96 % | SYSTOLIC BLOOD PRESSURE: 129 MMHG | HEART RATE: 88 BPM

## 2017-11-02 DIAGNOSIS — C91.10 CHRONIC LYMPHOCYTIC LEUKEMIA (H): Primary | ICD-10-CM

## 2017-11-02 PROCEDURE — 25000128 H RX IP 250 OP 636: Performed by: INTERNAL MEDICINE

## 2017-11-02 PROCEDURE — 96523 IRRIG DRUG DELIVERY DEVICE: CPT

## 2017-11-02 RX ORDER — HEPARIN SODIUM (PORCINE) LOCK FLUSH IV SOLN 100 UNIT/ML 100 UNIT/ML
500 SOLUTION INTRAVENOUS EVERY 8 HOURS
Status: DISCONTINUED | OUTPATIENT
Start: 2017-11-02 | End: 2017-11-02 | Stop reason: HOSPADM

## 2017-11-02 RX ORDER — HEPARIN SODIUM (PORCINE) LOCK FLUSH IV SOLN 100 UNIT/ML 100 UNIT/ML
500 SOLUTION INTRAVENOUS EVERY 8 HOURS
Status: CANCELLED
Start: 2017-11-02

## 2017-11-02 RX ADMIN — HEPARIN 500 UNITS: 100 SYRINGE at 08:54

## 2017-11-02 NOTE — MR AVS SNAPSHOT
After Visit Summary   11/2/2017    Jennifer Barajas    MRN: [id number]           Patient Information     Date Of Birth          1940        Visit Information        Provider Department      11/2/2017 9:00 AM HC INF RM 3302 [facility] [place]        Today's Diagnoses     Chronic lymphocytic leukemia (H)    -  1      Care Instructions    We will see you back for your next scheduled appointment.            Follow-ups after your visit        Your next 10 appointments already scheduled     Nov 30, 2017  9:00 AM CST   Level O with HC INF RM 3308   [facility] [place] ([facility] [place] )    [address]  [place] [address]   [phone]            Dec 28, 2017  9:00 AM CST   Level O with HC INF RM 3306   [facility] [place] ([facility] [place] )    [address]  [place] [address]   [phone]            Feb 01, 2018  9:30 AM CST   Level O with HC INF RM 3306   [facility] [place] ([facility] [place] )    [address]  [place] [address]   [phone]            Feb 01, 2018 10:00 AM CST   (Arrive by 9:45 AM)   Return Visit with Yaneli Gray NP   [facility] [place] ([facility] [place] )    [address]  [place] [address]   [phone]              Who to contact     If you have questions or need follow up information about today's clinic visit or your schedule please contact [facility] directly at [phone].  Normal or non-critical lab and imaging results will be communicated to you by MyChart, letter or phone within 4 business days after the clinic has received the results. If you do not hear from us within 7 days, please contact the clinic through [employer] or phone. If you have a critical or abnormal lab result, we will notify you by phone as soon as possible.  Submit refill requests through [employer] or call your pharmacy and they will forward the refill request to us. Please  "allow 3 business days for your refill to be completed.          Additional Information About Your Visit        MyChart Information     [employer] gives you secure access to your electronic health record. If you see a primary care provider, you can also send messages to your care team and make appointments. If you have questions, please call your primary care clinic.  If you do not have a primary care provider, please call [phone] and they will assist you.        Care EveryWhere ID     This is your Care EveryWhere ID. This could be used by other organizations to access your [facility] medical records  [id number]        Your Vitals Were     Pulse Temperature Respirations Height Pulse Oximetry BMI (Body Mass Index)    88 97.9  F (36.6  C) (Oral) 16 1.499 m (4' 11.02\") 96% 20.59 kg/m2       Blood Pressure from Last 3 Encounters:   11/02/17 129/71   10/05/17 145/67   10/05/17 145/67    Weight from Last 3 Encounters:   11/02/17 46.3 kg (102 lb)   10/05/17 46.5 kg (102 lb 8 oz)   10/05/17 46.5 kg (102 lb 8 oz)              Today, you had the following     No orders found for display       Primary Care Provider Office Phone # Fax #    Zheng De La Rosa [phone]       [facility] [address] [address]        Equal Access to Services     RADHA BENAVIDES AH: Hadii aad ku hadasho Soomaali, waaxda luqadaha, qaybta kaalmada adeegyada, apolinar mukherjee . So [facility] [phone].    ATENCIÓN: Si habla español, tiene a darden disposición servicios gratuitos de asistencia lingüística. Llame al [phone].    We comply with applicable federal civil rights laws and [place] laws. We do not discriminate on the basis of race, color, national origin, age, disability, sex, sexual orientation, or gender identity.            Thank you!     Thank you for choosing [facility]  for your care. Our goal is always to provide you with excellent care. Hearing back from our " patients is one way we can continue to improve our services. Please take a few minutes to complete the written survey that you may receive in the mail after your visit with us. Thank you!             Your Updated Medication List - Protect others around you: Learn how to safely use, store and throw away your medicines at www.disposemymeds.org.          This list is accurate as of: 11/2/17  9:10 AM.  Always use your most recent med list.                   Brand Name Dispense Instructions for use Diagnosis    alendronate 70 MG tablet    FOSAMAX    12 tablet    TAKE 1 TABLET BY MOUTH EVERY WEEK IN THE MORNING; AT LEAST 30 MINUTES BEFORE THE FIRST FOOD, BEVERAGE OR MEDICATION OF THE DAY.    Osteoporosis, unspecified osteoporosis type, unspecified pathological fracture presence       calcium gluconate 500 MG Tabs tablet      Take 1,200 mg by mouth daily    Chronic lymphoid leukemia, 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       lidocaine-prilocaine cream    EMLA    30 g    Apply topically as needed for moderate pain Apply to port site 1 hour before chemotherapy appointment    Chronic lymphocytic leukemia in remission (H)       VITAMIN D3 PO      Take by mouth daily Reported on 5/1/2017    Chronic lymphoid leukemia, 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

## 2017-11-30 ENCOUNTER — INFUSION THERAPY VISIT (OUTPATIENT)
Dept: INFUSION THERAPY | Facility: OTHER | Age: 77
End: 2017-11-30
Attending: NURSE PRACTITIONER
Payer: MEDICARE

## 2017-11-30 VITALS
RESPIRATION RATE: 16 BRPM | TEMPERATURE: 97 F | DIASTOLIC BLOOD PRESSURE: 74 MMHG | SYSTOLIC BLOOD PRESSURE: 140 MMHG | HEART RATE: 78 BPM | OXYGEN SATURATION: 99 %

## 2017-11-30 DIAGNOSIS — C91.10 CHRONIC LYMPHOCYTIC LEUKEMIA (H): Primary | ICD-10-CM

## 2017-11-30 PROCEDURE — 25000128 H RX IP 250 OP 636: Performed by: INTERNAL MEDICINE

## 2017-11-30 PROCEDURE — 96523 IRRIG DRUG DELIVERY DEVICE: CPT

## 2017-11-30 RX ORDER — HEPARIN SODIUM (PORCINE) LOCK FLUSH IV SOLN 100 UNIT/ML 100 UNIT/ML
500 SOLUTION INTRAVENOUS EVERY 8 HOURS
Status: CANCELLED
Start: 2017-11-30

## 2017-11-30 RX ORDER — HEPARIN SODIUM (PORCINE) LOCK FLUSH IV SOLN 100 UNIT/ML 100 UNIT/ML
500 SOLUTION INTRAVENOUS EVERY 8 HOURS
Status: DISCONTINUED | OUTPATIENT
Start: 2017-11-30 | End: 2017-11-30 | Stop reason: HOSPADM

## 2017-11-30 RX ADMIN — HEPARIN 500 UNITS: 100 SYRINGE at 08:56

## 2017-11-30 NOTE — PATIENT INSTRUCTIONS
We will see you back in 4 weeks for your next port flush.  If you have any questions, please call [phone].  If you need a refill, please contact your pharmacy.

## 2017-11-30 NOTE — MR AVS SNAPSHOT
After Visit Summary   11/30/2017    Jennifer Barajas    MRN: [id number]           Patient Information     Date Of Birth          1940        Visit Information        Provider Department      11/30/2017 9:00 AM HC INF RM 3308 [facility]        Today's Diagnoses     Chronic lymphocytic leukemia (H)    -  1      Care Instructions    We will see you back in 4 weeks for your next port flush.  If you have any questions, please call [phone].  If you need a refill, please contact your pharmacy.            Follow-ups after your visit        Your next 10 appointments already scheduled     Dec 28, 2017  9:00 AM CST   Level O with HC INF RM 3306   [facility] [place] ([facility] [place] )    [address]  [place] [address]   [phone]            Feb 01, 2018  9:30 AM CST   Level O with HC INF RM 3306   [facility] [place] ([facility] [place] )    [address]  [place] [address]   [phone]            Feb 01, 2018 10:00 AM CST   (Arrive by 9:45 AM)   Return Visit with Yaneli Gray NP   [facility] ([facility] [place] )    [address]  [place] [address]   [phone]              Who to contact     If you have questions or need follow up information about today's clinic visit or your schedule please contact [facility] directly at [phone].  Normal or non-critical lab and imaging results will be communicated to you by MyChart, letter or phone within 4 business days after the clinic has received the results. If you do not hear from us within 7 days, please contact the clinic through MyChart or phone. If you have a critical or abnormal lab result, we will notify you by phone as soon as possible.  Submit refill requests through [employer] or call your pharmacy and they will forward the refill request to us. Please allow 3 business days for your refill to be completed.          Additional  Information About Your Visit        MyChart Information     [employer] gives you secure access to your electronic health record. If you see a primary care provider, you can also send messages to your care team and make appointments. If you have questions, please call your primary care clinic.  If you do not have a primary care provider, please call [phone] and they will assist you.        Care EveryWhere ID     This is your Care EveryWhere ID. This could be used by other organizations to access your [facility] medical records  [id number]        Your Vitals Were     Pulse Temperature Respirations Pulse Oximetry          78 97  F (36.1  C) (Oral) 16 99%         Blood Pressure from Last 3 Encounters:   11/30/17 140/74   11/02/17 129/71   10/05/17 145/67    Weight from Last 3 Encounters:   11/02/17 46.3 kg (102 lb)   10/05/17 46.5 kg (102 lb 8 oz)   10/05/17 46.5 kg (102 lb 8 oz)              Today, you had the following     No orders found for display       Primary Care Provider Office Phone # Fax #    Zheng De La Rosa [phone]       [facility] [address]  [place] [address]        Equal Access to Services     RANDALL BENAVIDES AH: Hadii aad ku hadasho Soomaali, waaxda luqadaha, qaybta kaalmada adeegyada, apolinar thacker. So [facility] [place] [facility] [phone].    ATENCIÓN: Si habla español, tiene a darden disposición servicios gratuitos de asistencia lingüística. Llame al [phone].    We comply with applicable federal civil rights laws and [place] laws. We do not discriminate on the basis of race, color, national origin, age, disability, sex, sexual orientation, or gender identity.            Thank you!     Thank you for choosing [facility]  for your care. Our goal is always to provide you with excellent care. Hearing back from our patients is one way we can continue to improve our services. Please take a few minutes to complete the written survey that you may  receive in the mail after your visit with us. Thank you!             Your Updated Medication List - Protect others around you: Learn how to safely use, store and throw away your medicines at www.disposemymeds.org.          This list is accurate as of: 11/30/17  9:08 AM.  Always use your most recent med list.                   Brand Name Dispense Instructions for use Diagnosis    alendronate 70 MG tablet    FOSAMAX    12 tablet    TAKE 1 TABLET BY MOUTH EVERY WEEK IN THE MORNING; AT LEAST 30 MINUTES BEFORE THE FIRST FOOD, BEVERAGE OR MEDICATION OF THE DAY.    Osteoporosis, unspecified osteoporosis type, unspecified pathological fracture presence       calcium gluconate 500 MG Tabs tablet      Take 1,200 mg by mouth daily    Chronic lymphoid leukemia, 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       lidocaine-prilocaine cream    EMLA    30 g    Apply topically as needed for moderate pain Apply to port site 1 hour before chemotherapy appointment    Chronic lymphocytic leukemia in remission (H)       VITAMIN D3 PO      Take by mouth daily Reported on 5/1/2017    Chronic lymphoid leukemia, 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

## 2017-12-28 ENCOUNTER — INFUSION THERAPY VISIT (OUTPATIENT)
Dept: INFUSION THERAPY | Facility: OTHER | Age: 77
End: 2017-12-28
Attending: NURSE PRACTITIONER
Payer: MEDICARE

## 2017-12-28 VITALS
RESPIRATION RATE: 18 BRPM | DIASTOLIC BLOOD PRESSURE: 59 MMHG | OXYGEN SATURATION: 99 % | SYSTOLIC BLOOD PRESSURE: 151 MMHG | HEART RATE: 77 BPM | WEIGHT: 103.7 LBS | HEIGHT: 59 IN | TEMPERATURE: 97.8 F | BODY MASS INDEX: 20.91 KG/M2

## 2017-12-28 DIAGNOSIS — C91.10 CHRONIC LYMPHOCYTIC LEUKEMIA (H): Primary | ICD-10-CM

## 2017-12-28 PROCEDURE — 25000128 H RX IP 250 OP 636: Performed by: INTERNAL MEDICINE

## 2017-12-28 PROCEDURE — 96523 IRRIG DRUG DELIVERY DEVICE: CPT

## 2017-12-28 RX ORDER — HEPARIN SODIUM (PORCINE) LOCK FLUSH IV SOLN 100 UNIT/ML 100 UNIT/ML
500 SOLUTION INTRAVENOUS EVERY 8 HOURS
Status: CANCELLED
Start: 2017-12-28

## 2017-12-28 RX ORDER — HEPARIN SODIUM (PORCINE) LOCK FLUSH IV SOLN 100 UNIT/ML 100 UNIT/ML
500 SOLUTION INTRAVENOUS EVERY 8 HOURS
Status: DISCONTINUED | OUTPATIENT
Start: 2017-12-28 | End: 2017-12-28 | Stop reason: HOSPADM

## 2017-12-28 RX ADMIN — SODIUM CHLORIDE, PRESERVATIVE FREE 500 UNITS: 5 INJECTION INTRAVENOUS at 09:44

## 2017-12-28 NOTE — MR AVS SNAPSHOT
After Visit Summary   12/28/2017    Jennifer Barajas    MRN: [id number]           Patient Information     Date Of Birth          1940        Visit Information        Provider Department      12/28/2017 9:00 AM HC INF RM 3306 [facility]        Today's Diagnoses     Chronic lymphocytic leukemia (H)    -  1      Care Instructions    Follow up per schedule. Call with questions or concerns.          Follow-ups after your visit        Your next 10 appointments already scheduled     Feb 01, 2018  9:30 AM CST   Level O with HC INF RM 3312   [facility] ([facility] [place] )    [address]  [place] [address]   [phone]            Feb 01, 2018 10:00 AM CST   (Arrive by 9:45 AM)   Return Visit with Yaneli Gray NP   [facility] ([facility] [place] )    [address]  [place] [address]   [phone]              Who to contact     If you have questions or need follow up information about today's clinic visit or your schedule please contact [facility] directly at [phone].  Normal or non-critical lab and imaging results will be communicated to you by Synacorhart, letter or phone within 4 business days after the clinic has received the results. If you do not hear from us within 7 days, please contact the clinic through Synacorhart or phone. If you have a critical or abnormal lab result, we will notify you by phone as soon as possible.  Submit refill requests through Planana or call your pharmacy and they will forward the refill request to us. Please allow 3 business days for your refill to be completed.          Additional Information About Your Visit        MyChart Information     Plan[employer] gives you secure access to your electronic health record. If you see a primary care provider, you can also send messages to your care team and make appointments. If you have questions, please call your primary care clinic.  If  "you do not have a primary care provider, please call [phone] and they will assist you.        Care EveryWhere ID     This is your Care EveryWhere ID. This could be used by other organizations to access your [facility] medical records  [id number]        Your Vitals Were     Pulse Temperature Respirations Height Pulse Oximetry BMI (Body Mass Index)    77 97.8  F (36.6  C) (Tympanic) 18 1.499 m (4' 11.02\") 99% 20.93 kg/m2       Blood Pressure from Last 3 Encounters:   12/28/17 151/59   11/30/17 140/74   11/02/17 129/71    Weight from Last 3 Encounters:   12/28/17 47 kg (103 lb 11.2 oz)   11/02/17 46.3 kg (102 lb)   10/05/17 46.5 kg (102 lb 8 oz)              Today, you had the following     No orders found for display       Primary Care Provider Office Phone # Fax #    Zheng De La Rosa [phone]       [facility] [address] [facility] [address]        Equal Access to Services     [facility]: Hadii aad ku hadasho Soomaali, waaxda luqadaha, qaybta kaalmada adeegyada, apolinar mukherjee . So [facility] [phone].    ATENCIÓN: Si habla español, tiene a darden disposición servicios gratuitos de asistencia lingüística. Alin al [phone].    We comply with applicable federal civil rights laws and [place] laws. We do not discriminate on the basis of race, color, national origin, age, disability, sex, sexual orientation, or gender identity.            Thank you!     Thank you for choosing [facility]  for your care. Our goal is always to provide you with excellent care. Hearing back from our patients is one way we can continue to improve our services. Please take a few minutes to complete the written survey that you may receive in the mail after your visit with us. Thank you!             Your Updated Medication List - Protect others around you: Learn how to safely use, store and throw away your medicines at www.disposemymeds.org.          This list is " accurate as of: 12/28/17  9:34 AM.  Always use your most recent med list.                   Brand Name Dispense Instructions for use Diagnosis    alendronate 70 MG tablet    FOSAMAX    12 tablet    TAKE 1 TABLET BY MOUTH EVERY WEEK IN THE MORNING; AT LEAST 30 MINUTES BEFORE THE FIRST FOOD, BEVERAGE OR MEDICATION OF THE DAY.    Osteoporosis, unspecified osteoporosis type, unspecified pathological fracture presence       calcium gluconate 500 MG Tabs tablet      Take 1,200 mg by mouth daily    Chronic lymphoid leukemia, 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       lidocaine-prilocaine cream    EMLA    30 g    Apply topically as needed for moderate pain Apply to port site 1 hour before chemotherapy appointment    Chronic lymphocytic leukemia in remission (H)       VITAMIN D3 PO      Take by mouth daily Reported on 5/1/2017    Chronic lymphoid leukemia, 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

## 2018-02-01 ENCOUNTER — INFUSION THERAPY VISIT (OUTPATIENT)
Dept: INFUSION THERAPY | Facility: OTHER | Age: 78
End: 2018-02-01
Attending: NURSE PRACTITIONER
Payer: MEDICARE

## 2018-02-01 VITALS
SYSTOLIC BLOOD PRESSURE: 156 MMHG | TEMPERATURE: 98 F | HEIGHT: 59 IN | HEART RATE: 69 BPM | DIASTOLIC BLOOD PRESSURE: 62 MMHG | BODY MASS INDEX: 20.91 KG/M2 | OXYGEN SATURATION: 99 % | WEIGHT: 103.7 LBS | RESPIRATION RATE: 16 BRPM

## 2018-02-01 DIAGNOSIS — C91.10 CHRONIC LYMPHOCYTIC LEUKEMIA (H): ICD-10-CM

## 2018-02-01 DIAGNOSIS — C91.11 CHRONIC LYMPHOCYTIC LEUKEMIA IN REMISSION (H): Primary | ICD-10-CM

## 2018-02-01 LAB
ALBUMIN SERPL-MCNC: 4 G/DL (ref 3.4–5)
ALP SERPL-CCNC: 94 U/L (ref 40–150)
ALT SERPL W P-5'-P-CCNC: 31 U/L (ref 0–50)
ANION GAP SERPL CALCULATED.3IONS-SCNC: 7 MMOL/L (ref 3–14)
AST SERPL W P-5'-P-CCNC: 27 U/L (ref 0–45)
BASOPHILS # BLD AUTO: 0 10E9/L (ref 0–0.2)
BASOPHILS NFR BLD AUTO: 0.5 %
BILIRUB SERPL-MCNC: 0.5 MG/DL (ref 0.2–1.3)
BUN SERPL-MCNC: 14 MG/DL (ref 7–30)
CALCIUM SERPL-MCNC: 9 MG/DL (ref 8.5–10.1)
CHLORIDE SERPL-SCNC: 103 MMOL/L (ref 94–109)
CO2 SERPL-SCNC: 28 MMOL/L (ref 20–32)
CREAT SERPL-MCNC: 0.76 MG/DL (ref 0.52–1.04)
DIFFERENTIAL METHOD BLD: NORMAL
EOSINOPHIL # BLD AUTO: 0.1 10E9/L (ref 0–0.7)
EOSINOPHIL NFR BLD AUTO: 1 %
ERYTHROCYTE [DISTWIDTH] IN BLOOD BY AUTOMATED COUNT: 13.3 % (ref 10–15)
GFR SERPL CREATININE-BSD FRML MDRD: 74 ML/MIN/1.7M2
GLUCOSE SERPL-MCNC: 92 MG/DL (ref 70–99)
HCT VFR BLD AUTO: 42.6 % (ref 35–47)
HGB BLD-MCNC: 14.4 G/DL (ref 11.7–15.7)
IMM GRANULOCYTES # BLD: 0 10E9/L (ref 0–0.4)
IMM GRANULOCYTES NFR BLD: 0.4 %
LDH SERPL L TO P-CCNC: 208 U/L (ref 81–234)
LYMPHOCYTES # BLD AUTO: 1.5 10E9/L (ref 0.8–5.3)
LYMPHOCYTES NFR BLD AUTO: 17.8 %
MCH RBC QN AUTO: 29.6 PG (ref 26.5–33)
MCHC RBC AUTO-ENTMCNC: 33.8 G/DL (ref 31.5–36.5)
MCV RBC AUTO: 88 FL (ref 78–100)
MONOCYTES # BLD AUTO: 0.7 10E9/L (ref 0–1.3)
MONOCYTES NFR BLD AUTO: 7.8 %
NEUTROPHILS # BLD AUTO: 6.1 10E9/L (ref 1.6–8.3)
NEUTROPHILS NFR BLD AUTO: 72.5 %
NRBC # BLD AUTO: 0 10*3/UL
NRBC BLD AUTO-RTO: 0 /100
PLATELET # BLD AUTO: 259 10E9/L (ref 150–450)
POTASSIUM SERPL-SCNC: 3.8 MMOL/L (ref 3.4–5.3)
PROT SERPL-MCNC: 7.4 G/DL (ref 6.8–8.8)
RBC # BLD AUTO: 4.86 10E12/L (ref 3.8–5.2)
SODIUM SERPL-SCNC: 138 MMOL/L (ref 133–144)
WBC # BLD AUTO: 8.4 10E9/L (ref 4–11)

## 2018-02-01 PROCEDURE — 80053 COMPREHEN METABOLIC PANEL: CPT | Mod: ZL | Performed by: NURSE PRACTITIONER

## 2018-02-01 PROCEDURE — 85025 COMPLETE CBC W/AUTO DIFF WBC: CPT | Mod: ZL | Performed by: NURSE PRACTITIONER

## 2018-02-01 PROCEDURE — 96523 IRRIG DRUG DELIVERY DEVICE: CPT

## 2018-02-01 PROCEDURE — 36415 COLL VENOUS BLD VENIPUNCTURE: CPT | Mod: ZL | Performed by: NURSE PRACTITIONER

## 2018-02-01 PROCEDURE — 83615 LACTATE (LD) (LDH) ENZYME: CPT | Mod: ZL | Performed by: NURSE PRACTITIONER

## 2018-02-01 PROCEDURE — 25000128 H RX IP 250 OP 636: Performed by: INTERNAL MEDICINE

## 2018-02-01 RX ORDER — HEPARIN SODIUM (PORCINE) LOCK FLUSH IV SOLN 100 UNIT/ML 100 UNIT/ML
500 SOLUTION INTRAVENOUS EVERY 8 HOURS
Status: CANCELLED
Start: 2018-02-01

## 2018-02-01 RX ORDER — HEPARIN SODIUM (PORCINE) LOCK FLUSH IV SOLN 100 UNIT/ML 100 UNIT/ML
500 SOLUTION INTRAVENOUS EVERY 8 HOURS
Status: DISCONTINUED | OUTPATIENT
Start: 2018-02-01 | End: 2018-02-01 | Stop reason: HOSPADM

## 2018-02-01 RX ADMIN — SODIUM CHLORIDE, PRESERVATIVE FREE 500 UNITS: 5 INJECTION INTRAVENOUS at 09:27

## 2018-02-01 NOTE — PROGRESS NOTES
Patients left sided port accessed using non-coring, 19 gauge, 3/4 needle. Port accessed per facility protocol.  Hand hygiene performed: yes   Mask donned by caregiver: yes Site prepped with CHG: yes Labs drawn: yes Dressing applied using aseptic technique: yes Port flushed easily, without resistance. Flushed with 10 cc's normal saline.   Immediate blood return noted. 10 cc blood discarded.  2 vials blood draw and sent to lab for results.  Port flushed with 20 cc 0.9% normal saline and 5 cc heparinized saline.  Needle removed intact, sterile dressing applied.  Slight pressure applied for 30 seconds.   Patient tolerated port flush well, denies pain nor discomfort at this time. Patient instructed to leave dressing intact for a minimum of one hour, and to call with questions or concerns.  Patient states understanding and is in agreement with this plan. Copy of appointments, and after visit summary (AVS) given to patient.  Patient discharged ambulatory. Natalia Lea

## 2018-02-01 NOTE — MR AVS SNAPSHOT
After Visit Summary   2/1/2018    Jennifer Barajas    MRN: [id number]           Patient Information     Date Of Birth          1940        Visit Information        Provider Department      2/1/2018 9:00 AM HC INF RM 3312 [facility]        Today's Diagnoses     Chronic lymphocytic leukemia in remission (H)    -  1    Chronic lymphocytic leukemia (H)           Follow-ups after your visit        Your next 10 appointments already scheduled     Feb 22, 2018  8:30 AM CST   Level O with HC INF RM 3302   [facility] ([facility] [place] )    [address]  [place] [address]   [phone]            Feb 22, 2018  9:00 AM CST   (Arrive by 8:45 AM)   Return Visit with Yaneli Gray NP   [facility] [place] ([facility] [place] )    [address]  [place] [address]   [phone]            Mar 22, 2018  8:30 AM CDT   Level O with HC INF RM 3302   [facility] [place] ([facility] [place] )    [address]  [place] [address]   [phone]            Apr 19, 2018  8:30 AM CDT   Level O with HC INF RM 3302   [facility] [place] ([facility] [place] )    [address]  [place] [address]   [phone]            May 17, 2018  8:30 AM CDT   Level O with HC INF RM 3308   [facility] [place] ([facility] [place] )    [address]  [place] [address]   [phone]              Who to contact     If you have questions or need follow up information about today's clinic visit or your schedule please contact [facility] directly at [phone].  Normal or non-critical lab and imaging results will be communicated to you by MyChart, letter or phone within 4 business days after the clinic has received the results. If you do not hear from us within 7 days, please contact the clinic through MyChart or phone. If you have a critical or abnormal lab result, we will notify you by  "phone as soon as possible.  Submit refill requests through [employer] or call your pharmacy and they will forward the refill request to us. Please allow 3 business days for your refill to be completed.          Additional Information About Your Visit        [employer] Information     [employer] gives you secure access to your electronic health record. If you see a primary care provider, you can also send messages to your care team and make appointments. If you have questions, please call your primary care clinic.  If you do not have a primary care provider, please call [phone] and they will assist you.        Care EveryWhere ID     This is your Care EveryWhere ID. This could be used by other organizations to access your [facility] medical records  [id number]        Your Vitals Were     Pulse Temperature Respirations Height Pulse Oximetry BMI (Body Mass Index)    69 98  F (36.7  C) (Oral) 16 1.499 m (4' 11.02\") 99% 20.93 kg/m2       Blood Pressure from Last 3 Encounters:   02/01/18 156/62   12/28/17 151/59   11/30/17 140/74    Weight from Last 3 Encounters:   02/01/18 47 kg (103 lb 11.2 oz)   12/28/17 47 kg (103 lb 11.2 oz)   11/02/17 46.3 kg (102 lb)              We Performed the Following     CBC with platelets differential     Comprehensive metabolic panel     Lactate Dehydrogenase        Primary Care Provider Office Phone # Fax #    Zheng De La Rosa [phone]       [facility] [address]  [place] [address]        Equal Access to Services     [facility] : Hadii aad ku hadasho Soomaali, waaxda luqadaha, qaybta kaalmada adeegyada, apolinar mukherjee . So [facility] [phone].    ATENCIÓN: Si habla español, tiene a darden disposición servicios gratuitos de asistencia lingüística. Llame al [phone].    We comply with applicable federal civil rights laws and [place] laws. We do not discriminate on the basis of race, color, national origin, age, disability, sex, " sexual orientation, or gender identity.            Thank you!     [facility]  [facility]. Our goal is always to provide you with excellent care. Hearing back from our patients is one way we can continue to improve our services. Please take a few minutes to complete the written survey that you may receive in the mail after your visit with us. Thank you!             Your Updated Medication List - Protect others around you: Learn how to safely use, store and throw away your medicines at www.disposemymeds.org.          This list is accurate as of 2/1/18 11:21 AM.  Always use your most recent med list.                   Brand Name Dispense Instructions for use Diagnosis    alendronate 70 MG tablet    FOSAMAX    12 tablet    TAKE 1 TABLET BY MOUTH EVERY WEEK IN THE MORNING; AT LEAST 30 MINUTES BEFORE THE FIRST FOOD, BEVERAGE OR MEDICATION OF THE DAY.    Osteoporosis, unspecified osteoporosis type, unspecified pathological fracture presence       calcium gluconate 500 MG Tabs tablet      Take 1,200 mg by mouth daily    Chronic lymphoid leukemia, 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       lidocaine-prilocaine cream    EMLA    30 g    Apply topically as needed for moderate pain Apply to port site 1 hour before chemotherapy appointment    Chronic lymphocytic leukemia in remission (H)       VITAMIN D3 PO      Take by mouth daily Reported on 5/1/2017    Chronic lymphoid leukemia, 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

## 2018-02-02 NOTE — PROGRESS NOTES
Phone to pt to report her labs per provider request. Pt verbalized understanding and had no further questions or concerns.

## 2018-02-22 ENCOUNTER — ONCOLOGY VISIT (OUTPATIENT)
Dept: ONCOLOGY | Facility: OTHER | Age: 78
End: 2018-02-22
Attending: NURSE PRACTITIONER
Payer: MEDICARE

## 2018-02-22 ENCOUNTER — INFUSION THERAPY VISIT (OUTPATIENT)
Dept: INFUSION THERAPY | Facility: OTHER | Age: 78
End: 2018-02-22
Attending: NURSE PRACTITIONER
Payer: MEDICARE

## 2018-02-22 VITALS
TEMPERATURE: 96.4 F | RESPIRATION RATE: 20 BRPM | HEIGHT: 59 IN | BODY MASS INDEX: 21.19 KG/M2 | HEART RATE: 81 BPM | DIASTOLIC BLOOD PRESSURE: 66 MMHG | OXYGEN SATURATION: 97 % | WEIGHT: 105.1 LBS | SYSTOLIC BLOOD PRESSURE: 122 MMHG

## 2018-02-22 VITALS
WEIGHT: 105.1 LBS | TEMPERATURE: 96.4 F | OXYGEN SATURATION: 97 % | HEIGHT: 59 IN | SYSTOLIC BLOOD PRESSURE: 122 MMHG | RESPIRATION RATE: 20 BRPM | BODY MASS INDEX: 21.19 KG/M2 | HEART RATE: 81 BPM | DIASTOLIC BLOOD PRESSURE: 66 MMHG

## 2018-02-22 DIAGNOSIS — C91.10 CHRONIC LYMPHOCYTIC LEUKEMIA (H): Primary | ICD-10-CM

## 2018-02-22 PROCEDURE — G0463 HOSPITAL OUTPT CLINIC VISIT: HCPCS

## 2018-02-22 PROCEDURE — 99213 OFFICE O/P EST LOW 20 MIN: CPT | Performed by: NURSE PRACTITIONER

## 2018-02-22 PROCEDURE — 96523 IRRIG DRUG DELIVERY DEVICE: CPT

## 2018-02-22 PROCEDURE — 25000128 H RX IP 250 OP 636: Performed by: INTERNAL MEDICINE

## 2018-02-22 RX ORDER — HEPARIN SODIUM (PORCINE) LOCK FLUSH IV SOLN 100 UNIT/ML 100 UNIT/ML
500 SOLUTION INTRAVENOUS EVERY 8 HOURS
Status: DISCONTINUED | OUTPATIENT
Start: 2018-02-22 | End: 2018-02-22 | Stop reason: HOSPADM

## 2018-02-22 RX ORDER — HEPARIN SODIUM (PORCINE) LOCK FLUSH IV SOLN 100 UNIT/ML 100 UNIT/ML
500 SOLUTION INTRAVENOUS EVERY 8 HOURS
Status: CANCELLED
Start: 2018-02-22

## 2018-02-22 RX ADMIN — HEPARIN 500 UNITS: 100 SYRINGE at 08:48

## 2018-02-22 ASSESSMENT — PAIN SCALES - GENERAL: PAINLEVEL: NO PAIN (0)

## 2018-02-22 NOTE — PATIENT INSTRUCTIONS
We will see you back in 4 weeks for your next port flush.  If you have any questions, please call [phone].  If you need a refill, please contact your pharmacy.

## 2018-02-22 NOTE — PATIENT INSTRUCTIONS
We would like to see you back in 4 months. Please come 30 minutes prior for lab work along with your port flush.  If you have any questions please call [phone]  Other instructions:  none

## 2018-02-22 NOTE — PROGRESS NOTES
Oncology Follow-up Visit:  February 22, 2018    Reason for Visit:  Patient presents with:  RECHECK: 5 month follow up for chronic lymphocytic leukemia     Nursing Note and documentation reviewed: yes    HPI:  This is a 76-year-old female patient who presents to the oncology/hematology clinic today in follow up of CLL diagnosed December 2011. She completed 6 cycles of Bendamustine July 2, 2015.   She is being followed with surveillance.  She presents to the clinic today stating she is feeling well and offers no complaints.  Denies any fevers, night sweats, chills, new lumps or bumps or any weight loss or changes in her appetite.    Oncologic History: Jennifer was diagnosed with CLL in December 2011 after her primary care provider noted an elevated WBC on her annual exam. A peripheral blood flow for cytometry revealed she was CD5 positive, CD10 negative, consistent with CLL/SLL. Staging studies were completed and CT scan showed no evidence of lymphadenopathy or splenomegaly. She was asymptomatic with essentially stable lymphocyte count and followed with surveillance.      With patient's follow up in October 2014, hemoglobin was noted to be 10.7 with Hematocrit 30.0 and platelets 137,000. She was subsequently seen again 2 months later with further drop in hemoglobin to 8.6 with hematocrit 26.1 and platelets 140,000 with a white blood cell count of 13.5 and ANC 1.2, absolute lymphocytes were 12.1. A PET scan was completed on 12/11/2014 which did show mildly enlarged axillary lymph nodes with very mild abnormal hypermetabolism of SUV of 2 or less. Bone marrow aspiration and biopsy was completed which revealed infiltration of CLL with 90% of cells confirmed to be CLL via flow morphologically. Patient was staged at least stage III CLL based on her anemia. The plan was to proceed with chemotherapy consisting of Rituxan and Fludara. Patient did experience hypersensitivity reaction with cycle 2 Rituxan therapy experiencing  hypotension. This was discontinued and chemotherapy regime was changed to Bendamustine day 1 and day 2 every 28 days. She was started on 2/12/2015 and completed therapy in July 2015.      Current Chemo Regime/TX:  n/a  Current Cycle:  n/a  # of completed cycles:  n/a      Previous treatment: Rituxan/Fludara completing one full cycle and experienced hypersensitivity with Rituxan with second cycle;  Bendamustine 50 mg per metered squared days 1 and 2 every 28 days x6 cycles completed 7/2/15    Past Medical History:   Diagnosis Date     Chronic lymphoid leukemia, 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 2/28/2012     Diverticulosis of colon (without mention of hemorrhage) 11/29/2010     Family history of ischemic heart disease 11/29/2010     Family history of malignant neoplasm of gastrointestinal tract 10/14/2002     Non-toxic multinodular goiter 11/8/2016     Osteoporosis, unspecified 11/29/2010     Other and unspecified hyperlipidemia 11/28/2011     Viral warts, unspecified 11/28/2011       Past Surgical History:   Procedure Laterality Date     ------------OTHER-------------      elbow repair     COLONOSCOPY  01/12/2010    repeat in 2015     COLONOSCOPY       COLONOSCOPY       INSERT PORT VASCULAR ACCESS N/A 1/5/2015    Procedure: INSERT PORT VASCULAR ACCESS;  Surgeon: Linda Oliver MD;  Location: [facility]     ORTHOPEDIC SURGERY  1993    Left elbow repair       Family History   Problem Relation Age of Onset     CANCER Mother 97     colon cancer - cause of death     Colon Cancer Mother      CANCER Father      Colon Cancer Father      DIABETES Brother      Obesity Brother      Breast Cancer Daughter      Hyperlipidemia Daughter      Thyroid Disease Daughter      Hypertension Son      Hyperlipidemia Son      Thyroid Disease Cousin      Hyperlipidemia Son      Coronary Artery Disease No family hx of      CEREBROVASCULAR DISEASE No family hx of      Prostate Cancer No family hx of      Other Cancer No  family hx of      Anesthesia Reaction No family hx of      Asthma No family hx of      Genetic Disorder No family hx of        Social History     Social History     Marital status:      Spouse name: N/A     Number of children: N/A     Years of education: N/A     Occupational History     [employer]      Social History Main Topics     Smoking status: Never Smoker     Smokeless tobacco: Never Used     Alcohol use No     Drug use: No     Sexual activity: No      Comment: devorced     Other Topics Concern     Parent/Sibling W/ Cabg, Mi Or Angioplasty Before 65f 55m? No      Service No     Blood Transfusions Yes     permits if needed     Caffeine Concern Yes     1 cup     Occupational Exposure No     Hobby Hazards No     Sleep Concern No     Stress Concern No     Weight Concern No     Special Diet No     Back Care No     Exercise No     Seat Belt Yes     Social History Narrative       Current Outpatient Prescriptions   Medication     alendronate (FOSAMAX) 70 MG tablet     lidocaine-prilocaine (EMLA) cream     calcium gluconate 500 MG TABS     Cholecalciferol (VITAMIN D3 PO)     No current facility-administered medications for this visit.      Facility-Administered Medications Ordered in Other Visits   Medication     sodium chloride (PF) 0.9% PF flush 10 mL     heparin 100 UNIT/ML injection 500 Units        Allergies   Allergen Reactions     Sulfa Drugs Rash     Rituxan [Rituximab]      Simvastatin Other (See Comments)     Takes pravastatin at home  Zocor - myalgia       Review Of Systems:  Constitutional: denies fever, weight changes, chills, and night sweats.  Eyes: denies blurred or double vision  Ears/Nose/Throat: denies ear pain, nose problems, difficulty swallowing  Respiratory: denies shortness of breath, cough or hemoptysis  Skin: denies rash, lesions  Breast/Chest wall: denies pain, lumps or discharge; due for mammogram  Cardiovascular: denies chest pain, palpitations, edema  Gastrointestinal:  "denies abdominal pain, bloating, nausea, vomiting, early satiety  Genitourinary: denies difficulty with urination, blood in urine  Musculoskeletal:denies new muscle pain, bone pain  Neurologic: denies lightheadedness, headaches, numbness or tingling  Psychiatric: denies anxiety, depression  Hematologic/Lymphatic/Immunologic: denies easy bruising, easy bleeding, lumps or bumps noted  Endocrine: Denies increased thirst      Physical Exam:  /66  Pulse 81  Temp 96.4  F (35.8  C) (Tympanic)  Resp 20  Ht 1.499 m (4' 11\")  Wt 47.7 kg (105 lb 1.6 oz)  SpO2 97%  BMI 21.23 kg/m2    GENERAL APPEARANCE: Healthy, alert and in no acute distress.  HEENT: Normocephalic, Sclerae anicteric. Oropharynx without ulcers, lesions, or thrush.  NECK:  No asymmetry or masses, no thyromegaly.  LYMPHATICS: No palpable cervical, supraclavicular, axillary, or inguinal nodes   RESP: Lungs clear to auscultation bilaterally, respirations regular and easy  CARDIOVASCULAR: Regular rate and rhythm. Normal S1, S2; no murmur, gallop, or rub.  ABDOMEN: Soft, nontender. Bowel sounds auscultated all 4 quadrants. No palpable organomegaly or masses.  MUSCULOSKELETAL: Extremities without gross deformities noted. No edema of bilateral lower extremities.  NEURO: Alert and oriented x 3.  Gait steady.  PSYCHIATRIC: Mentation and affect appear normal.  Mood appropriate.    Laboratory:  Results for orders placed or performed in visit on 02/01/18   CBC with platelets differential   Result Value Ref Range    WBC 8.4 4.0 - 11.0 10e9/L    RBC Count 4.86 3.8 - 5.2 10e12/L    Hemoglobin 14.4 11.7 - 15.7 g/dL    Hematocrit 42.6 35.0 - 47.0 %    MCV 88 78 - 100 fl    MCH 29.6 26.5 - 33.0 pg    MCHC 33.8 31.5 - 36.5 g/dL    RDW 13.3 10.0 - 15.0 %    Platelet Count 259 150 - 450 10e9/L    Diff Method Automated Method     % Neutrophils 72.5 %    % Lymphocytes 17.8 %    % Monocytes 7.8 %    % Eosinophils 1.0 %    % Basophils 0.5 %    % Immature Granulocytes 0.4 % "    Nucleated RBCs 0 0 /100    Absolute Neutrophil 6.1 1.6 - 8.3 10e9/L    Absolute Lymphocytes 1.5 0.8 - 5.3 10e9/L    Absolute Monocytes 0.7 0.0 - 1.3 10e9/L    Absolute Eosinophils 0.1 0.0 - 0.7 10e9/L    Absolute Basophils 0.0 0.0 - 0.2 10e9/L    Abs Immature Granulocytes 0.0 0 - 0.4 10e9/L    Absolute Nucleated RBC 0.0    Comprehensive metabolic panel   Result Value Ref Range    Sodium 138 133 - 144 mmol/L    Potassium 3.8 3.4 - 5.3 mmol/L    Chloride 103 94 - 109 mmol/L    Carbon Dioxide 28 20 - 32 mmol/L    Anion Gap 7 3 - 14 mmol/L    Glucose 92 70 - 99 mg/dL    Urea Nitrogen 14 7 - 30 mg/dL    Creatinine 0.76 0.52 - 1.04 mg/dL    GFR Estimate 74 >60 mL/min/1.7m2    GFR Estimate If Black 89 >60 mL/min/1.7m2    Calcium 9.0 8.5 - 10.1 mg/dL    Bilirubin Total 0.5 0.2 - 1.3 mg/dL    Albumin 4.0 3.4 - 5.0 g/dL    Protein Total 7.4 6.8 - 8.8 g/dL    Alkaline Phosphatase 94 40 - 150 U/L    ALT 31 0 - 50 U/L    AST 27 0 - 45 U/L   Lactate Dehydrogenase   Result Value Ref Range    Lactate Dehydrogenase 208 81 - 234 U/L       Imaging Studies:  None for today        ASSESSMENT/PLAN:    #1 CLL/SLL: Diagnosed with CLL/SLL in December 2011. Stage III. She received 6 cycles of bendamustine completing this July 2015 and is now followed with surveillance.  Labs are good with normal LDH and patient is feeling well. She will follow up in 4 months with a CBC, CMP and LDH.      I encouraged patient to call with any questions or concerns.    .    Yaneli CHO, FNP-BC, AOCNP

## 2018-02-22 NOTE — MR AVS SNAPSHOT
After Visit Summary   2/22/2018    Jennifer Barajas    MRN: [id number]           Patient Information     Date Of Birth          1940        Visit Information        Provider Department      2/22/2018 9:00 AM Yaneli Gray NP [facility] [place]        Today's Diagnoses     Chronic lymphocytic leukemia (H)    -  1      Care Instructions    We would like to see you back in 4 months. Please come 30 minutes prior for lab work along with your port flush.  If you have any questions please call [phone]  Other instructions:  none          Follow-ups after your visit        Your next 10 appointments already scheduled     Mar 22, 2018  8:30 AM CDT   Level O with HC INF RM 3302   [facility] [place] ([facility] [place] )    [address]  [place] [address]   [phone]            Apr 19, 2018  8:30 AM CDT   Level O with HC INF RM 3302   [facility] [place] ([facility] [place] )    [address]  [place] [address]   [phone]            May 17, 2018  8:30 AM CDT   Level O with HC INF RM 3308   [facility] [place] ([facility] [place] )    [address]  [place] [address]   [phone]            Jun 22, 2018  8:30 AM CDT   Level O with HC INF RM 3302   [facility] [place] ([facility] [place] )    [address]  [place] [address]   [phone]            Jun 22, 2018  9:00 AM CDT   (Arrive by 8:45 AM)   Return Visit with Yaneli Gray NP   [facility] [place] ([facility] [place] )    [address]  [place] [address]   [phone]              Future tests that were ordered for you today     Open Future Orders        Priority Expected Expires Ordered    CBC with platelets differential Routine 6/22/2018 7/22/2018 2/22/2018    Comprehensive metabolic panel Routine 6/22/2018 7/22/2018 2/22/2018    Lactate Dehydrogenase Routine 6/22/2018 7/22/2018 2/22/2018            Who to  "contact     If you have questions or need follow up information about today's clinic visit or your schedule please contact [facility] directly at [phone].  Normal or non-critical lab and imaging results will be communicated to you by MyChart, letter or phone within 4 business days after the clinic has received the results. If you do not hear from us within 7 days, please contact the clinic through MyChart or phone. If you have a critical or abnormal lab result, we will notify you by phone as soon as possible.  Submit refill requests through Teachable or call your pharmacy and they will forward the refill request to us. Please allow 3 business days for your refill to be completed.          Additional Information About Your Visit        [employer] Information     Teachable gives you secure access to your electronic health record. If you see a primary care provider, you can also send messages to your care team and make appointments. If you have questions, please call your primary care clinic.  If you do not have a primary care provider, please call [phone] and they will assist you.        Care EveryWhere ID     This is your Care EveryWhere ID. This could be used by other organizations to access your [place] medical records  [id number]        Your Vitals Were     Pulse Temperature Respirations Height Pulse Oximetry BMI (Body Mass Index)    81 96.4  F (35.8  C) (Tympanic) 20 1.499 m (4' 11\") 97% 21.23 kg/m2       Blood Pressure from Last 3 Encounters:   02/22/18 122/66   02/22/18 122/66   02/01/18 156/62    Weight from Last 3 Encounters:   02/22/18 47.7 kg (105 lb 1.6 oz)   02/22/18 47.7 kg (105 lb 1.6 oz)   02/01/18 47 kg (103 lb 11.2 oz)               Primary Care Provider Office Phone # Fax #    Zheng De La Rosa [phone]       [facility] [address]        Equal Access to Services     RANDALL BENAVIDES AH: Hadii charity daviso Sohosea, waaxda luqadaha, qaybta kaalmada " apolinar cappszelalem howardamador peñaaan ah. Ale [facility] [phone].    ATENCIÓN: Si nick elder, tiene a darden disposición servicios gratuitos de asistencia lingüística. Alin al [phone].    We comply with applicable federal civil rights laws and [place] laws. We do not discriminate on the basis of race, color, national origin, age, disability, sex, sexual orientation, or gender identity.            Thank you!     Thank you for choosing [facility]  for your care. Our goal is always to provide you with excellent care. Hearing back from our patients is one way we can continue to improve our services. Please take a few minutes to complete the written survey that you may receive in the mail after your visit with us. Thank you!             Your Updated Medication List - Protect others around you: Learn how to safely use, store and throw away your medicines at www.disposemymeds.org.          This list is accurate as of 2/22/18  9:15 AM.  Always use your most recent med list.                   Brand Name Dispense Instructions for use Diagnosis    alendronate 70 MG tablet    FOSAMAX    12 tablet    TAKE 1 TABLET BY MOUTH EVERY WEEK IN THE MORNING; AT LEAST 30 MINUTES BEFORE THE FIRST FOOD, BEVERAGE OR MEDICATION OF THE DAY.    Osteoporosis, unspecified osteoporosis type, unspecified pathological fracture presence       calcium gluconate 500 MG Tabs tablet      Take 1,200 mg by mouth daily    Chronic lymphoid leukemia, 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       lidocaine-prilocaine cream    EMLA    30 g    Apply topically as needed for moderate pain Apply to port site 1 hour before chemotherapy appointment    Chronic lymphocytic leukemia in remission (H)       VITAMIN D3 PO      Take by mouth daily Reported on 5/1/2017    Chronic lymphoid leukemia, 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

## 2018-02-22 NOTE — MR AVS SNAPSHOT
After Visit Summary   2/22/2018    Jennifer Barajas    MRN: [id number]           Patient Information     Date Of Birth          1940        Visit Information        Provider Department      2/22/2018 8:30 AM HC INF RM 3302 [facility]        Today's Diagnoses     Chronic lymphocytic leukemia (H)    -  1      Care Instructions    We will see you back in 4 weeks for your next port flush.  If you have any questions, please call [phone].  If you need a refill, please contact your pharmacy.            Follow-ups after your visit        Your next 10 appointments already scheduled     Feb 22, 2018  9:00 AM CST   (Arrive by 8:45 AM)   [facility] Visit with [facility] NP   [facility] [place] )    [address]  [place] [address]   [phone]            Mar 22, 2018  8:30 AM CDT   Level O with HC INF RM 3302   [facility] [place] ([facility] [place] )    [address]  [place] [address]   [phone]            Apr 19, 2018  8:30 AM CDT   Level O with HC INF RM 3302   [facility] [place] ([facility] [place] )    [address]  [place] [address]   [phone]            May 17, 2018  8:30 AM CDT   Level O with HC INF RM 3308   [facility] [place] ([facility] [place] )    [address]  [place] [address]   [phone]              Who to contact     If you have questions or need follow up information about today's clinic visit or your schedule please contact [facility] directly at [phone].  Normal or non-critical lab and imaging results will be communicated to you by MyChart, letter or phone within 4 business days after the clinic has received the results. If you do not hear from us within 7 days, please contact the clinic through MyChart or phone. If you have a critical or abnormal lab result, we will notify you by phone as soon as possible.  Submit refill  "requests through Givespark or call your pharmacy and they will forward the refill request to us. Please allow 3 business days for your refill to be completed.          Additional Information About Your Visit        [employer] Information     [employer] gives you secure access to your electronic health record. If you see a primary care provider, you can also send messages to your care team and make appointments. If you have questions, please call your primary care clinic.  If you do not have a primary care provider, please call [phone] and they will assist you.        Care EveryWhere ID     This is your Care EveryWhere ID. This could be used by other organizations to access your [place] medical records  [id number]        Your Vitals Were     Pulse Temperature Respirations Height Pulse Oximetry BMI (Body Mass Index)    81 96.4  F (35.8  C) (Tympanic) 20 1.499 m (4' 11\") 97% 21.23 kg/m2       Blood Pressure from Last 3 Encounters:   02/22/18 122/66   02/01/18 156/62   12/28/17 151/59    Weight from Last 3 Encounters:   02/22/18 47.7 kg (105 lb 1.6 oz)   02/01/18 47 kg (103 lb 11.2 oz)   12/28/17 47 kg (103 lb 11.2 oz)              Today, you had the following     No orders found for display       Primary Care Provider Office Phone # Fax #    Zheng De La Rosa [phone]       [facility] [place] [address]        Equal Access to Services     [facility] AH: Hadii aad ku hadasho Soomaali, waaxda luqadaha, qaybta kaalmada adeegyada, apolinar mukherjee . So [facility] [phone].    ATENCIÓN: Si habla español, tiene a darden disposición servicios gratuitos de asistencia lingüística. Llame al [phone].    We comply with applicable federal civil rights laws and [place] laws. We do not discriminate on the basis of race, color, national origin, age, disability, sex, sexual orientation, or gender identity.            Thank you!     [facility]  [facility] " your care. Our goal is always to provide you with excellent care. Hearing back from our patients is one way we can continue to improve our services. Please take a few minutes to complete the written survey that you may receive in the mail after your visit with us. Thank you!             Your Updated Medication List - Protect others around you: Learn how to safely use, store and throw away your medicines at www.disposemymeds.org.          This list is accurate as of 2/22/18  8:53 AM.  Always use your most recent med list.                   Brand Name Dispense Instructions for use Diagnosis    alendronate 70 MG tablet    FOSAMAX    12 tablet    TAKE 1 TABLET BY MOUTH EVERY WEEK IN THE MORNING; AT LEAST 30 MINUTES BEFORE THE FIRST FOOD, BEVERAGE OR MEDICATION OF THE DAY.    Osteoporosis, unspecified osteoporosis type, unspecified pathological fracture presence       calcium gluconate 500 MG Tabs tablet      Take 1,200 mg by mouth daily    Chronic lymphoid leukemia, 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       lidocaine-prilocaine cream    EMLA    30 g    Apply topically as needed for moderate pain Apply to port site 1 hour before chemotherapy appointment    Chronic lymphocytic leukemia in remission (H)       VITAMIN D3 PO      Take by mouth daily Reported on 5/1/2017    Chronic lymphoid leukemia, 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

## 2018-02-22 NOTE — NURSING NOTE
"Chief Complaint   Patient presents with     RECHECK     5 month follow up for chronic lymphocytic leukemia       Initial /66  Pulse 81  Temp 96.4  F (35.8  C) (Tympanic)  Resp 20  Ht 1.499 m (4' 11\")  Wt 47.7 kg (105 lb 1.6 oz)  SpO2 97%  BMI 21.23 kg/m2 Estimated body mass index is 21.23 kg/(m^2) as calculated from the following:    Height as of this encounter: 1.499 m (4' 11\").    Weight as of this encounter: 47.7 kg (105 lb 1.6 oz).  Medication Reconciliation: complete   Immunizations reviewed and advanced directives on file, pain = 0, PHQ9 = 0.  Patient was assessed using the NCCN psychosocial distress thermometer. Patient rated the score as a 3. Patient rated current stressors as today's appointment. Stressors will be brought to the attention of provider or Oncology RN Care Coordinator for a score of 6 or greater or per nurses discretion.   Ana Tate LPN              "

## 2018-02-23 ASSESSMENT — PATIENT HEALTH QUESTIONNAIRE - PHQ9: SUM OF ALL RESPONSES TO PHQ QUESTIONS 1-9: 0

## 2018-03-22 ENCOUNTER — RADIANT APPOINTMENT (OUTPATIENT)
Dept: MAMMOGRAPHY | Facility: OTHER | Age: 78
End: 2018-03-22
Attending: FAMILY MEDICINE
Payer: MEDICARE

## 2018-03-22 ENCOUNTER — INFUSION THERAPY VISIT (OUTPATIENT)
Dept: INFUSION THERAPY | Facility: OTHER | Age: 78
End: 2018-03-22
Attending: NURSE PRACTITIONER
Payer: MEDICARE

## 2018-03-22 VITALS
DIASTOLIC BLOOD PRESSURE: 78 MMHG | TEMPERATURE: 96.9 F | RESPIRATION RATE: 18 BRPM | OXYGEN SATURATION: 98 % | WEIGHT: 104.7 LBS | BODY MASS INDEX: 21.11 KG/M2 | SYSTOLIC BLOOD PRESSURE: 134 MMHG | HEART RATE: 75 BPM | HEIGHT: 59 IN

## 2018-03-22 DIAGNOSIS — C91.10 CHRONIC LYMPHOCYTIC LEUKEMIA (H): Primary | ICD-10-CM

## 2018-03-22 PROCEDURE — 77067 SCR MAMMO BI INCL CAD: CPT | Mod: TC

## 2018-03-22 PROCEDURE — 96523 IRRIG DRUG DELIVERY DEVICE: CPT

## 2018-03-22 PROCEDURE — 25000128 H RX IP 250 OP 636: Performed by: INTERNAL MEDICINE

## 2018-03-22 RX ORDER — HEPARIN SODIUM (PORCINE) LOCK FLUSH IV SOLN 100 UNIT/ML 100 UNIT/ML
500 SOLUTION INTRAVENOUS EVERY 8 HOURS
Status: DISCONTINUED | OUTPATIENT
Start: 2018-03-22 | End: 2018-03-22 | Stop reason: HOSPADM

## 2018-03-22 RX ORDER — HEPARIN SODIUM (PORCINE) LOCK FLUSH IV SOLN 100 UNIT/ML 100 UNIT/ML
500 SOLUTION INTRAVENOUS EVERY 8 HOURS
Status: CANCELLED
Start: 2018-03-22

## 2018-03-22 RX ADMIN — SODIUM CHLORIDE, PRESERVATIVE FREE 500 UNITS: 5 INJECTION INTRAVENOUS at 08:38

## 2018-03-22 NOTE — PROGRESS NOTES
Patient is a 78 y/o female here today for port flush per order of Dr. Bach.  Skin is warm, clean, dry, and intact without redness, swelling, nor other signs of infection noted.  Port accessed via sterile technique per facility protocol with a 22 gauge, 3/4 inch non coring 90 degree bent hutchison needle. Port flushed easily, without resistance. Immediate blood return noted.  Flushed with 10 cc 0.9% normal saline and 5 cc heparinized saline.  Needle removed intact, sterile dressing applied.  Slight pressure applied for 30 seconds.   Patient tolerated port flush well, denies pain nor discomfort at this time. Patient instructed to leave dressing intact for a minimum of one hour, and to call with questions or concerns.  Patient states understanding and is in agreement with this plan.  Patient discharged ambulatory.  Shannan Guaman RN

## 2018-03-22 NOTE — MR AVS SNAPSHOT
"              After Visit Summary   3/22/2018    Jennifer Barajas    MRN: [id number]           Patient Information     Date Of Birth          1940        Visit Information        Provider Department      3/22/2018 8:30 AM HC INF RM 3302 [facility]        Today's Diagnoses     Chronic lymphocytic leukemia (H)    -  1      Care Instructions    See you back as planned          Follow-ups after your visit        Your next 10 appointments already scheduled     Mar 22, 2018  9:00 AM CDT   (Arrive by 8:45 AM)   MA SCREENING BILATERAL W/ SONIA with HCMA1   [facility] ([facility] [place] )    [address]  [place] [address]   [phone]           Three-dimensional (3D) mammograms are available at [facility] locations in [facility], [facility], [facility], [place], [place], and [place]. [facility] locations include [place] and [facility] in [place]. Benefits of 3D mammograms include: - Improved rate of cancer detection - Decreases your chance of having to go back for more tests, which means fewer: - \"False-positive\" results (This means that there is an abnormal area but it isn't cancer.) - Invasive testing procedures, such as a biopsy or surgery - Can provide clearer images of the breast if you have dense breast tissue. 3D mammography is an optional exam that anyone can have with a 2D mammogram. It doesn't replace or take the place of a 2D mammogram. 2D mammograms remain an effective screening test for all women.  Not all insurance companies cover the cost of a 3D mammogram. Check with your insurance.            Apr 19, 2018  8:30 AM CDT   Level O with HC INF RM 3302   [facility] [place] ([facility] [place] )    [address]  [place] [address]   [phone]            May 17, 2018  8:30 AM CDT   Level O with HC INF RM 3308   [facility] [place] [facility]    [address] [address]   [phone]            Jun 22, 2018  8:30 AM CDT   Level O with HC INF RM 3302   [facility] [place] ([facility] [place] )    [address]  [place] [address]   [phone]            Jun 22, 2018  9:00 AM CDT   (Arrive by 8:45 AM)   Return Visit with Yaneli Gray NP   [facility] [place] ([facility] [place] )    [address]  [place] [address]   [phone]              Who to contact     If you have questions or need follow up information about today's clinic visit or your schedule please contact [facility] directly at [phone].  Normal or non-critical lab and imaging results will be communicated to you by MyChart, letter or phone within 4 business days after the clinic has received the results. If you do not hear from us within 7 days, please contact the clinic through MyChart or phone. If you have a critical or abnormal lab result, we will notify you by phone as soon as possible.  Submit refill requests through Bloomspot or call your pharmacy and they will forward the refill request to us. Please allow 3 business days for your refill to be completed.          Additional Information About Your Visit        [employer] Information     Bloomspot gives you secure access to your electronic health record. If you see a primary care provider, you can also send messages to your care team and make appointments. If you have questions, please call your primary care clinic.  If you do not have a primary care provider, please call [phone] and they will assist you.        Care EveryWhere ID     This is your Care EveryWhere ID. This could be used by other organizations to access your [place] medical records  [id number]        Your Vitals Were     Pulse Temperature Respirations Height Pulse Oximetry BMI (Body Mass Index)    75 96.9  F (36.1  C) (Oral) 18 1.499 m (4' 11\") 98% 21.15 kg/m2       Blood Pressure from Last 3 Encounters: "   03/22/18 134/78   02/22/18 122/66   02/22/18 122/66    Weight from Last 3 Encounters:   03/22/18 47.5 kg (104 lb 11.2 oz)   02/22/18 47.7 kg (105 lb 1.6 oz)   02/22/18 47.7 kg (105 lb 1.6 oz)              Today, you had the following     No orders found for display       Primary Care Provider Office Phone # Fax #    Zheng De La Rosa [phone] [address] [facility] [address]        Equal Access to Services     [facility]: Hadii charity hernandez hadbao Sohosea, waaxda luqadaha, qaybta kaalmalisha capps, apolinar mukherjee . So [facility] [phone].    ATENCIÓN: Si habla español, tiene a darden disposición servicios gratuitos de asistencia lingüística. [facility] [phone].    We comply with applicable federal civil rights laws and [place] laws. We do not discriminate on the basis of race, color, national origin, age, disability, sex, sexual orientation, or gender identity.            Thank you!     Thank you for choosing [facility]  for your care. Our goal is always to provide you with excellent care. Hearing back from our patients is one way we can continue to improve our services. Please take a few minutes to complete the written survey that you may receive in the mail after your visit with us. Thank you!             Your Updated Medication List - Protect others around you: Learn how to safely use, store and throw away your medicines at www.disposemymeds.org.          This list is accurate as of 3/22/18  8:48 AM.  Always use your most recent med list.                   Brand Name Dispense Instructions for use Diagnosis    alendronate 70 MG tablet    FOSAMAX    12 tablet    TAKE 1 TABLET BY MOUTH EVERY WEEK IN THE MORNING; AT LEAST 30 MINUTES BEFORE THE FIRST FOOD, BEVERAGE OR MEDICATION OF THE DAY.    Osteoporosis, unspecified osteoporosis type, unspecified pathological fracture presence       calcium gluconate 500 MG Tabs tablet      Take 1,200 mg by mouth  daily    Chronic lymphoid leukemia, 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       lidocaine-prilocaine cream    EMLA    30 g    Apply topically as needed for moderate pain Apply to port site 1 hour before chemotherapy appointment    Chronic lymphocytic leukemia in remission (H)       VITAMIN D3 PO      Take by mouth daily Reported on 5/1/2017    Chronic lymphoid leukemia, 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

## 2018-04-20 ENCOUNTER — INFUSION THERAPY VISIT (OUTPATIENT)
Dept: INFUSION THERAPY | Facility: OTHER | Age: 78
End: 2018-04-20
Attending: INTERNAL MEDICINE
Payer: MEDICARE

## 2018-04-20 VITALS
RESPIRATION RATE: 16 BRPM | DIASTOLIC BLOOD PRESSURE: 70 MMHG | HEIGHT: 59 IN | TEMPERATURE: 96.9 F | SYSTOLIC BLOOD PRESSURE: 138 MMHG | HEART RATE: 84 BPM | WEIGHT: 103.6 LBS | OXYGEN SATURATION: 97 % | BODY MASS INDEX: 20.88 KG/M2

## 2018-04-20 DIAGNOSIS — C91.10 CHRONIC LYMPHOCYTIC LEUKEMIA (H): Primary | ICD-10-CM

## 2018-04-20 PROCEDURE — 96523 IRRIG DRUG DELIVERY DEVICE: CPT

## 2018-04-20 PROCEDURE — 25000128 H RX IP 250 OP 636: Performed by: INTERNAL MEDICINE

## 2018-04-20 RX ORDER — HEPARIN SODIUM (PORCINE) LOCK FLUSH IV SOLN 100 UNIT/ML 100 UNIT/ML
500 SOLUTION INTRAVENOUS EVERY 8 HOURS
Status: DISCONTINUED | OUTPATIENT
Start: 2018-04-20 | End: 2018-04-20 | Stop reason: HOSPADM

## 2018-04-20 RX ORDER — HEPARIN SODIUM (PORCINE) LOCK FLUSH IV SOLN 100 UNIT/ML 100 UNIT/ML
500 SOLUTION INTRAVENOUS EVERY 8 HOURS
Status: CANCELLED
Start: 2018-04-20

## 2018-04-20 RX ADMIN — SODIUM CHLORIDE, PRESERVATIVE FREE 500 UNITS: 5 INJECTION INTRAVENOUS at 08:32

## 2018-04-20 NOTE — MR AVS SNAPSHOT
After Visit Summary   4/20/2018    Jennifer Barajas    MRN: [id number]           Patient Information     Date Of Birth          1940        Visit Information        Provider Department      4/20/2018 8:30 AM HC INF RM 3308 [facility] [place]        Today's Diagnoses     Chronic lymphocytic leukemia (H)    -  1      Care Instructions    See you back as planned          Follow-ups after your visit        Your next 10 appointments already scheduled     May 17, 2018  8:30 AM CDT   Level O with HC INF RM 3308   [facility] [place] ([facility] [place] )    [address]  [place] [address]   [phone]            Jun 22, 2018  8:30 AM CDT   Level O with HC INF RM 3302   [facility] [place] ([facility] [place] )    [address]  [place] [address]   [phone]            Jun 22, 2018  9:00 AM CDT   (Arrive by 8:45 AM)   Return Visit with Yaneli Gray NP   [facility] [place] ([facility] [place] )    [address]  [place] [address]   [phone]              Who to contact     If you have questions or need follow up information about today's clinic visit or your schedule please contact [facility] directly at [phone].  Normal or non-critical lab and imaging results will be communicated to you by MyChart, letter or phone within 4 business days after the clinic has received the results. If you do not hear from us within 7 days, please contact the clinic through MyChart or phone. If you have a critical or abnormal lab result, we will notify you by phone as soon as possible.  Submit refill requests through [employer] or call your pharmacy and they will forward the refill request to us. Please allow 3 business days for your refill to be completed.          Additional Information About Your Visit        [employer] Information     [employer] gives you secure access to your electronic health record. If you see a primary  care provider, you can also send messages to your care team and make appointments. If you have questions, please call your primary care clinic.  If you do not have a primary care provider, please call [phone] and they will assist you.        Care EveryWhere ID     This is your Care EveryWhere ID. This could be used by other organizations to access your [facility] medical records  [id number]         Blood Pressure from Last 3 Encounters:   03/22/18 134/78   02/22/18 122/66   02/22/18 122/66    Weight from Last 3 Encounters:   03/22/18 47.5 kg (104 lb 11.2 oz)   02/22/18 47.7 kg (105 lb 1.6 oz)   02/22/18 47.7 kg (105 lb 1.6 oz)              Today, you had the following     No orders found for display       Primary Care Provider Office Phone # Fax #    Zheng De La Rosa [phone]       [address] [place], [place] [id number]        Equal Access to Services     [facility]: Hadii aad ku hadasho Soomaali, waaxda luqadaha, qaybta kaalmada adeegyada, waxay idiin haybraulion layne mukherjee . So [facility] [phone].    ATENCIÓN: Si habla español, tiene a darden disposición servicios gratuitos de asistencia lingüística. [facility] [phone].    We comply with applicable federal civil rights laws and [place] laws. We do not discriminate on the basis of race, color, national origin, age, disability, sex, sexual orientation, or gender identity.            Thank you!     Thank you for choosing [facility]  for your care. Our goal is always to provide you with excellent care. Hearing back from our patients is one way we can continue to improve our services. Please take a few minutes to complete the written survey that you may receive in the mail after your visit with us. Thank you!             Your Updated Medication List - Protect others around you: Learn how to safely use, store and throw away your medicines at www.disposemymeds.org.          This list is accurate as of 4/20/18  8:42 AM.   Always use your most recent med list.                   Brand Name Dispense Instructions for use Diagnosis    alendronate 70 MG tablet    FOSAMAX    12 tablet    TAKE 1 TABLET BY MOUTH EVERY WEEK IN THE MORNING; AT LEAST 30 MINUTES BEFORE THE FIRST FOOD, BEVERAGE OR MEDICATION OF THE DAY.    Osteoporosis, unspecified osteoporosis type, unspecified pathological fracture presence       calcium gluconate 500 MG Tabs tablet      Take 1,200 mg by mouth daily    Chronic lymphoid leukemia, 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       lidocaine-prilocaine cream    EMLA    30 g    Apply topically as needed for moderate pain Apply to port site 1 hour before chemotherapy appointment    Chronic lymphocytic leukemia in remission (H)       VITAMIN D3 PO      Take by mouth daily Reported on 5/1/2017    Chronic lymphoid leukemia, 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

## 2018-05-17 ENCOUNTER — INFUSION THERAPY VISIT (OUTPATIENT)
Dept: INFUSION THERAPY | Facility: OTHER | Age: 78
End: 2018-05-17
Attending: NURSE PRACTITIONER
Payer: MEDICARE

## 2018-05-17 VITALS
BODY MASS INDEX: 20.8 KG/M2 | HEIGHT: 59 IN | SYSTOLIC BLOOD PRESSURE: 131 MMHG | DIASTOLIC BLOOD PRESSURE: 67 MMHG | RESPIRATION RATE: 16 BRPM | WEIGHT: 103.2 LBS | HEART RATE: 78 BPM | OXYGEN SATURATION: 96 % | TEMPERATURE: 97.7 F

## 2018-05-17 DIAGNOSIS — C91.10 CHRONIC LYMPHOCYTIC LEUKEMIA (H): Primary | ICD-10-CM

## 2018-05-17 PROCEDURE — 96523 IRRIG DRUG DELIVERY DEVICE: CPT

## 2018-05-17 PROCEDURE — 25000128 H RX IP 250 OP 636: Performed by: INTERNAL MEDICINE

## 2018-05-17 RX ORDER — HEPARIN SODIUM (PORCINE) LOCK FLUSH IV SOLN 100 UNIT/ML 100 UNIT/ML
500 SOLUTION INTRAVENOUS EVERY 8 HOURS
Status: DISCONTINUED | OUTPATIENT
Start: 2018-05-17 | End: 2018-05-17 | Stop reason: HOSPADM

## 2018-05-17 RX ORDER — HEPARIN SODIUM (PORCINE) LOCK FLUSH IV SOLN 100 UNIT/ML 100 UNIT/ML
500 SOLUTION INTRAVENOUS EVERY 8 HOURS
Status: CANCELLED
Start: 2018-05-17

## 2018-05-17 RX ADMIN — SODIUM CHLORIDE, PRESERVATIVE FREE 500 UNITS: 5 INJECTION INTRAVENOUS at 08:33

## 2018-05-17 NOTE — MR AVS SNAPSHOT
After Visit Summary   5/17/2018    Jennifer Barajas    MRN: [id number]           Patient Information     Date Of Birth          1940        Visit Information        Provider Department      5/17/2018 8:30 AM HC INF RM 3308 [facility]        Today's Diagnoses     Chronic lymphocytic leukemia (H)    -  1      Care Instructions    See you back as planned          Follow-ups after your visit        Your next 10 appointments already scheduled     Jun 22, 2018  8:30 AM CDT   Level O with HC INF RM 3302   [facility] ([facility] [place] [facility]    [address]  [place] [address]   [phone]            Jun 22, 2018  9:00 AM CDT   (Arrive by 8:45 AM)   Return Visit with Yaneli Gray NP   [facility] ([facility] [place] )    [address]  [place] [address]   [phone]              Who to contact     If you have questions or need follow up information about today's clinic visit or your schedule please contact [facility] directly at [phone].  Normal or non-critical lab and imaging results will be communicated to you by [employer], letter or phone within 4 business days after the clinic has received the results. If you do not hear from us within 7 days, please contact the clinic through [employer] or phone. If you have a critical or abnormal lab result, we will notify you by phone as soon as possible.  Submit refill requests through [employer] or call your pharmacy and they will forward the refill request to us. Please allow 3 business days for your refill to be completed.          Additional Information About Your Visit        [employer] Information     [employer] gives you secure access to your electronic health record. If you see a primary care provider, you can also send messages to your care team and make appointments. If you have questions, please call your primary care clinic.  If you do not have a primary care  "provider, please call [phone] and they will assist you.        Care EveryWhere ID     This is your Care EveryWhere ID. This could be used by other organizations to access your [facility] medical records  [id number]        Your Vitals Were     Pulse Temperature Respirations Height Pulse Oximetry BMI (Body Mass Index)    78 97.7  F (36.5  C) (Oral) 16 1.499 m (4' 11\") 96% 20.84 kg/m2       Blood Pressure from Last 3 Encounters:   05/17/18 131/67   04/20/18 138/70   03/22/18 134/78    Weight from Last 3 Encounters:   05/17/18 46.8 kg (103 lb 3.2 oz)   04/20/18 47 kg (103 lb 9.6 oz)   03/22/18 47.5 kg (104 lb 11.2 oz)              Today, you had the following     No orders found for display       Primary Care Provider Office Phone # Fax #    Zheng De La Rosa [phone]       [facility] [place] [address]        Equal Access to Services     [facility]: Hadii charity hernandez hadasho Soomaali, waaxda luqadaha, qaybta kaalmada adeegyalisha, apolinar mukherjee . So [facility] [phone].    ATENCIÓN: Si habla español, tiene a darden disposición servicios gratuitos de asistencia lingüística. Llame al [phone].    We comply with applicable federal civil rights laws and [place] laws. We do not discriminate on the basis of race, color, national origin, age, disability, sex, sexual orientation, or gender identity.            Thank you!     Thank you for choosing [facility]  for your care. Our goal is always to provide you with excellent care. Hearing back from our patients is one way we can continue to improve our services. Please take a few minutes to complete the written survey that you may receive in the mail after your visit with us. Thank you!             Your Updated Medication List - Protect others around you: Learn how to safely use, store and throw away your medicines at www.disposemymeds.org.          This list is accurate as of 5/17/18  8:41 AM.  Always use your " most recent med list.                   Brand Name Dispense Instructions for use Diagnosis    alendronate 70 MG tablet    FOSAMAX    12 tablet    TAKE 1 TABLET BY MOUTH EVERY WEEK IN THE MORNING; AT LEAST 30 MINUTES BEFORE THE FIRST FOOD, BEVERAGE OR MEDICATION OF THE DAY.    Osteoporosis, unspecified osteoporosis type, unspecified pathological fracture presence       calcium gluconate 500 MG Tabs tablet      Take 1,200 mg by mouth daily    Chronic lymphoid leukemia, 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       lidocaine-prilocaine cream    EMLA    30 g    Apply topically as needed for moderate pain Apply to port site 1 hour before chemotherapy appointment    Chronic lymphocytic leukemia in remission (H)       VITAMIN D3 PO      Take by mouth daily Reported on 5/1/2017    Chronic lymphoid leukemia, 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

## 2018-05-17 NOTE — PROGRESS NOTES
Patient is a 78 y/o female here today for port flush per order of Dr. Bach. . Skin is warm, clean, dry, and intact without redness, swelling, nor other signs of infection noted.  Port accessed via sterile technique per facility protocol with a 22 gauge, 3/4 inch non coring 90 degree bent hutchison needle. Port flushed easily, without resistance. Immediate blood return noted.  Flushed with 10 cc 0.9% normal saline and 5 cc heparinized saline.  Needle removed intact, sterile dressing applied.  Slight pressure applied for 30 seconds.   Patient tolerated port flush well, denies pain nor discomfort at this time. Patient instructed to leave dressing intact for a minimum of one hour, and to call with questions or concerns.  Patient states understanding and is in agreement with this plan.  Patient discharged ambulatory.  Shannan Guaman RN

## 2018-06-21 NOTE — PROGRESS NOTES
Oncology Follow-up Visit:  June 22, 2018    Reason for Visit:  Patient presents with:  RECHECK: Follow up Chronic lymphocytic leukemia      Nursing Note and documentation reviewed: yes    HPI:   This is a 77-year-old female patient who presents to the oncology/hematology clinic today in follow up of CLL diagnosed December 2011. She completed 6 cycles of Bendamustine July 2, 2015.   She is being followed with surveillance. She presents today stating she is doing well.  Once again, she does have some increased anxiety in regards to having her labs drawn hoping everything is well.  Denies any issues with fevers, night sweats, changes in appetite or weight loss.  She denies any lumps or bumps that are concerning.    Oncologic History: Jennifer was diagnosed with CLL in December 2011 after her primary care provider noted an elevated WBC on her annual exam. A peripheral blood flow for cytometry revealed she was CD5 positive, CD10 negative, consistent with CLL/SLL. Staging studies were completed and CT scan showed no evidence of lymphadenopathy or splenomegaly. She was asymptomatic with essentially stable lymphocyte count and followed with surveillance.      With patient's follow up in October 2014, hemoglobin was noted to be 10.7 with Hematocrit 30.0 and platelets 137,000. She was subsequently seen again 2 months later with further drop in hemoglobin to 8.6 with hematocrit 26.1 and platelets 140,000 with a white blood cell count of 13.5 and ANC 1.2, absolute lymphocytes were 12.1. A PET scan was completed on 12/11/2014 which did show mildly enlarged axillary lymph nodes with very mild abnormal hypermetabolism of SUV of 2 or less. Bone marrow aspiration and biopsy was completed which revealed infiltration of CLL with 90% of cells confirmed to be CLL via flow morphologically. Patient was staged at least stage III CLL based on her anemia. The plan was to proceed with chemotherapy consisting of Rituxan and Fludara. Patient did  experience hypersensitivity reaction with cycle 2 Rituxan therapy experiencing hypotension. This was discontinued and chemotherapy regime was changed to Bendamustine day 1 and day 2 every 28 days. She was started on 2/12/2015 and completed therapy in July 2015.      Current Chemo Regime/TX:  n/a  Current Cycle:  n/a  # of completed cycles:  n/a      Previous treatment: Rituxan/Fludara completing one full cycle and experienced hypersensitivity with Rituxan with second cycle;  Bendamustine 50 mg per metered squared days 1 and 2 every 28 days x6 cycles completed 7/2/15     Past Medical History:   Diagnosis Date     Chronic lymphoid leukemia, 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 2/28/2012     Diverticulosis of colon (without mention of hemorrhage) 11/29/2010     Family history of ischemic heart disease 11/29/2010     Family history of malignant neoplasm of gastrointestinal tract 10/14/2002     Non-toxic multinodular goiter 11/8/2016     Osteoporosis, unspecified 11/29/2010     Other and unspecified hyperlipidemia 11/28/2011     Viral warts, unspecified 11/28/2011       Past Surgical History:   Procedure Laterality Date     ------------OTHER-------------      elbow repair     COLONOSCOPY  01/12/2010    repeat in 2015     COLONOSCOPY       COLONOSCOPY       INSERT PORT VASCULAR ACCESS N/A 1/5/2015    Procedure: INSERT PORT VASCULAR ACCESS;  Surgeon: Linda Oliver MD;  Location: [facility]     ORTHOPEDIC SURGERY  1993    Left elbow repair       Family History   Problem Relation Age of Onset     Cancer Mother 97     colon cancer - cause of death     Colon Cancer Mother      Cancer Father      Colon Cancer Father      Diabetes Brother      Obesity Brother      Breast Cancer Daughter      Hyperlipidemia Daughter      Thyroid Disease Daughter      Hypertension Son      Hyperlipidemia Son      Thyroid Disease Cousin      Hyperlipidemia Son      Coronary Artery Disease No family hx of      Cerebrovascular  Disease No family hx of      Prostate Cancer No family hx of      Other Cancer No family hx of      Anesthesia Reaction No family hx of      Asthma No family hx of      Genetic Disorder No family hx of        Social History     Social History     Marital status:      Spouse name: N/A     Number of children: N/A     Years of education: N/A     Occupational History     [employer]      Social History Main Topics     Smoking status: Never Smoker     Smokeless tobacco: Never Used     Alcohol use No     Drug use: No     Sexual activity: No      Comment: devorced     Other Topics Concern     Parent/Sibling W/ Cabg, Mi Or Angioplasty Before 65f 55m? No      Service No     Blood Transfusions Yes     permits if needed     Caffeine Concern Yes     1 cup     Occupational Exposure No     Hobby Hazards No     Sleep Concern No     Stress Concern No     Weight Concern No     Special Diet No     Back Care No     Exercise No     Seat Belt Yes     Social History Narrative       Current Outpatient Prescriptions   Medication     alendronate (FOSAMAX) 70 MG tablet     calcium gluconate 500 MG TABS     Cholecalciferol (VITAMIN D3 PO)     lidocaine-prilocaine (EMLA) cream     No current facility-administered medications for this visit.      Facility-Administered Medications Ordered in Other Visits   Medication     heparin 100 UNIT/ML injection 500 Units     sodium chloride (PF) 0.9% PF flush 10 mL        Allergies   Allergen Reactions     Sulfa Drugs Rash     Rituxan [Rituximab]      Simvastatin Other (See Comments)     Takes pravastatin at home  Zocor - myalgia       Review Of Systems:  Constitutional: denies fever, weight changes, chills, and night sweats.  Eyes: denies blurred or double vision; denies eye pain  Ears/Nose/Throat: denies ear pain, nose problems, difficulty swallowing  Respiratory: denies shortness of breath, cough   Skin: denies rash, lesions  Cardiovascular: denies chest pain, palpitations,  "edema  Gastrointestinal: denies abdominal pain, bloating, nausea, vomiting, early satiety; no blood in her stool or problems with BM's  Genitourinary: denies difficulty with urination, blood in urine  Musculoskeletal:denies new muscle pain, bone pain  Neurologic: denies lightheadedness, headaches, numbness or tingling  Psychiatric: denies anxiety, depression  Hematologic/Lymphatic/Immunologic: denies easy bruising, easy bleeding, lumps or bumps noted  Endocrine: Denies increased thirst    Physical Exam:  /78  Pulse 96  Temp 97.6  F (36.4  C) (Tympanic)  Resp 18  Ht 1.499 m (4' 11\")  Wt 47.2 kg (104 lb)  SpO2 98%  BMI 21.01 kg/m2    GENERAL APPEARANCE: Healthy, alert and in no acute distress.  HEENT: Normocephalic, Sclerae anicteric. Oropharynx without ulcers, lesions, or thrush.  NECK:  No asymmetry or masses, no thyromegaly.  LYMPHATICS: No palpable cervical, supraclavicular, axillary, or inguinal nodes   RESP: Lungs clear to auscultation bilaterally, respirations regular and easy  CARDIOVASCULAR: Regular rate and rhythm. Normal S1, S2; no murmur, gallop, or rub.  ABDOMEN: Soft, nontender. Bowel sounds auscultated all 4 quadrants. No palpable organomegaly or masses.  MUSCULOSKELETAL: Extremities without gross deformities noted. No edema of bilateral lower extremities.  NEURO: Alert and oriented x 3.  Gait steady.  PSYCHIATRIC: Mentation and affect appear normal.  Mood appropriate.    Laboratory:  Results for orders placed or performed in visit on 06/22/18   CBC with platelets differential   Result Value Ref Range    WBC 7.7 4.0 - 11.0 10e9/L    RBC Count 4.73 3.8 - 5.2 10e12/L    Hemoglobin 14.2 11.7 - 15.7 g/dL    Hematocrit 41.2 35.0 - 47.0 %    MCV 87 78 - 100 fl    MCH 30.0 26.5 - 33.0 pg    MCHC 34.5 31.5 - 36.5 g/dL    RDW 13.5 10.0 - 15.0 %    Platelet Count 241 150 - 450 10e9/L    Diff Method Manual Differential     % Neutrophils 73.0 %    % Lymphocytes 18.0 %    % Monocytes 5.0 %    % " Eosinophils 4.0 %    % Basophils 0.0 %    Absolute Neutrophil 5.6 1.6 - 8.3 10e9/L    Absolute Lymphocytes 1.4 0.8 - 5.3 10e9/L    Absolute Monocytes 0.4 0.0 - 1.3 10e9/L    Absolute Eosinophils 0.3 0.0 - 0.7 10e9/L    Absolute Basophils 0.0 0.0 - 0.2 10e9/L    Reactive Lymphs Present    Comprehensive metabolic panel   Result Value Ref Range    Sodium 141 133 - 144 mmol/L    Potassium 3.8 3.4 - 5.3 mmol/L    Chloride 105 94 - 109 mmol/L    Carbon Dioxide 28 20 - 32 mmol/L    Anion Gap 8 3 - 14 mmol/L    Glucose 106 (H) 70 - 99 mg/dL    Urea Nitrogen 14 7 - 30 mg/dL    Creatinine 0.78 0.52 - 1.04 mg/dL    GFR Estimate 72 >60 mL/min/1.7m2    GFR Estimate If Black 87 >60 mL/min/1.7m2    Calcium 8.7 8.5 - 10.1 mg/dL    Bilirubin Total 0.5 0.2 - 1.3 mg/dL    Albumin 3.9 3.4 - 5.0 g/dL    Protein Total 7.1 6.8 - 8.8 g/dL    Alkaline Phosphatase 89 40 - 150 U/L    ALT 32 0 - 50 U/L    AST 30 0 - 45 U/L   Lactate Dehydrogenase   Result Value Ref Range    Lactate Dehydrogenase 217 81 - 234 U/L       Imaging Studies:  None for today        ASSESSMENT/PLAN:    #1 CLL/SLL: Diagnosed with CLL/SLL in December 2011. Stage III. She received 6 cycles of bendamustine completing this July 2015 and is now followed with surveillance.  Labs are good with normal LDH and patient is feeling well. She will follow up in 4 months with a CBC, CMP and LDH.       I encouraged patient to call with any questions or concerns.    Yaneli CHO, FNP-BC, AOCNP

## 2018-06-22 ENCOUNTER — ONCOLOGY VISIT (OUTPATIENT)
Dept: ONCOLOGY | Facility: OTHER | Age: 78
End: 2018-06-22
Attending: NURSE PRACTITIONER
Payer: MEDICARE

## 2018-06-22 ENCOUNTER — INFUSION THERAPY VISIT (OUTPATIENT)
Dept: INFUSION THERAPY | Facility: OTHER | Age: 78
End: 2018-06-22
Attending: NURSE PRACTITIONER
Payer: MEDICARE

## 2018-06-22 VITALS
HEIGHT: 59 IN | DIASTOLIC BLOOD PRESSURE: 78 MMHG | RESPIRATION RATE: 18 BRPM | SYSTOLIC BLOOD PRESSURE: 120 MMHG | HEART RATE: 96 BPM | WEIGHT: 104 LBS | BODY MASS INDEX: 20.96 KG/M2 | OXYGEN SATURATION: 98 % | TEMPERATURE: 97.6 F

## 2018-06-22 VITALS
RESPIRATION RATE: 18 BRPM | OXYGEN SATURATION: 98 % | HEART RATE: 96 BPM | HEIGHT: 59 IN | SYSTOLIC BLOOD PRESSURE: 120 MMHG | TEMPERATURE: 97.6 F | DIASTOLIC BLOOD PRESSURE: 78 MMHG | WEIGHT: 104 LBS | BODY MASS INDEX: 20.96 KG/M2

## 2018-06-22 DIAGNOSIS — C91.10 CHRONIC LYMPHOCYTIC LEUKEMIA (H): Primary | ICD-10-CM

## 2018-06-22 LAB
ALBUMIN SERPL-MCNC: 3.9 G/DL (ref 3.4–5)
ALP SERPL-CCNC: 89 U/L (ref 40–150)
ALT SERPL W P-5'-P-CCNC: 32 U/L (ref 0–50)
ANION GAP SERPL CALCULATED.3IONS-SCNC: 8 MMOL/L (ref 3–14)
AST SERPL W P-5'-P-CCNC: 30 U/L (ref 0–45)
BASOPHILS # BLD AUTO: 0 10E9/L (ref 0–0.2)
BASOPHILS NFR BLD AUTO: 0 %
BILIRUB SERPL-MCNC: 0.5 MG/DL (ref 0.2–1.3)
BUN SERPL-MCNC: 14 MG/DL (ref 7–30)
CALCIUM SERPL-MCNC: 8.7 MG/DL (ref 8.5–10.1)
CHLORIDE SERPL-SCNC: 105 MMOL/L (ref 94–109)
CO2 SERPL-SCNC: 28 MMOL/L (ref 20–32)
CREAT SERPL-MCNC: 0.78 MG/DL (ref 0.52–1.04)
DIFFERENTIAL METHOD BLD: NORMAL
EOSINOPHIL # BLD AUTO: 0.3 10E9/L (ref 0–0.7)
EOSINOPHIL NFR BLD AUTO: 4 %
ERYTHROCYTE [DISTWIDTH] IN BLOOD BY AUTOMATED COUNT: 13.5 % (ref 10–15)
GFR SERPL CREATININE-BSD FRML MDRD: 72 ML/MIN/1.7M2
GLUCOSE SERPL-MCNC: 106 MG/DL (ref 70–99)
HCT VFR BLD AUTO: 41.2 % (ref 35–47)
HGB BLD-MCNC: 14.2 G/DL (ref 11.7–15.7)
LDH SERPL L TO P-CCNC: 217 U/L (ref 81–234)
LYMPHOCYTES # BLD AUTO: 1.4 10E9/L (ref 0.8–5.3)
LYMPHOCYTES NFR BLD AUTO: 18 %
MCH RBC QN AUTO: 30 PG (ref 26.5–33)
MCHC RBC AUTO-ENTMCNC: 34.5 G/DL (ref 31.5–36.5)
MCV RBC AUTO: 87 FL (ref 78–100)
MONOCYTES # BLD AUTO: 0.4 10E9/L (ref 0–1.3)
MONOCYTES NFR BLD AUTO: 5 %
NEUTROPHILS # BLD AUTO: 5.6 10E9/L (ref 1.6–8.3)
NEUTROPHILS NFR BLD AUTO: 73 %
PLATELET # BLD AUTO: 241 10E9/L (ref 150–450)
POTASSIUM SERPL-SCNC: 3.8 MMOL/L (ref 3.4–5.3)
PROT SERPL-MCNC: 7.1 G/DL (ref 6.8–8.8)
RBC # BLD AUTO: 4.73 10E12/L (ref 3.8–5.2)
SODIUM SERPL-SCNC: 141 MMOL/L (ref 133–144)
VARIANT LYMPHS BLD QL SMEAR: PRESENT
WBC # BLD AUTO: 7.7 10E9/L (ref 4–11)

## 2018-06-22 PROCEDURE — 99213 OFFICE O/P EST LOW 20 MIN: CPT | Performed by: NURSE PRACTITIONER

## 2018-06-22 PROCEDURE — 36415 COLL VENOUS BLD VENIPUNCTURE: CPT | Mod: ZL | Performed by: NURSE PRACTITIONER

## 2018-06-22 PROCEDURE — 85025 COMPLETE CBC W/AUTO DIFF WBC: CPT | Mod: ZL | Performed by: NURSE PRACTITIONER

## 2018-06-22 PROCEDURE — 96523 IRRIG DRUG DELIVERY DEVICE: CPT

## 2018-06-22 PROCEDURE — 83615 LACTATE (LD) (LDH) ENZYME: CPT | Mod: ZL | Performed by: NURSE PRACTITIONER

## 2018-06-22 PROCEDURE — 80053 COMPREHEN METABOLIC PANEL: CPT | Mod: ZL | Performed by: NURSE PRACTITIONER

## 2018-06-22 PROCEDURE — 25000128 H RX IP 250 OP 636: Performed by: INTERNAL MEDICINE

## 2018-06-22 PROCEDURE — G0463 HOSPITAL OUTPT CLINIC VISIT: HCPCS

## 2018-06-22 RX ORDER — HEPARIN SODIUM (PORCINE) LOCK FLUSH IV SOLN 100 UNIT/ML 100 UNIT/ML
500 SOLUTION INTRAVENOUS EVERY 8 HOURS
Status: CANCELLED
Start: 2018-06-22

## 2018-06-22 RX ORDER — HEPARIN SODIUM (PORCINE) LOCK FLUSH IV SOLN 100 UNIT/ML 100 UNIT/ML
500 SOLUTION INTRAVENOUS EVERY 8 HOURS
Status: DISCONTINUED | OUTPATIENT
Start: 2018-06-22 | End: 2018-06-22 | Stop reason: HOSPADM

## 2018-06-22 RX ADMIN — HEPARIN 500 UNITS: 100 SYRINGE at 08:51

## 2018-06-22 ASSESSMENT — PAIN SCALES - GENERAL: PAINLEVEL: NO PAIN (0)

## 2018-06-22 NOTE — PATIENT INSTRUCTIONS
We would like to see you back in 4 months. Please come 30 minutes prior for lab work through your port.  We will set you up for monthly port flushes.  If you have any questions please call [phone]  Other instructions:  none

## 2018-06-22 NOTE — MR AVS SNAPSHOT
After Visit Summary   6/22/2018    Jennifer Barajas    MRN: [id number]           Patient Information     Date Of Birth          1940        Visit Information        Provider Department      6/22/2018 8:30 AM [facility] RM [facility]        Today's Diagnoses     Chronic lymphocytic leukemia (H)    -  1      Care Instructions    WE will see you back as planned          Follow-ups after your visit        Your next 10 appointments already scheduled     Jun 22, 2018  9:00 AM CDT   (Arrive by 8:45 AM)   Return Visit with Yaneli Gray NP   [facility] [place] ([facility] )    [address]  [place] [address]   [phone]              Who to contact     If you have questions or need follow up information about today's clinic visit or your schedule please contact [facility] directly at [phone].  Normal or non-critical lab and imaging results will be communicated to you by MyChart, letter or phone within 4 business days after the clinic has received the results. If you do not hear from us within 7 days, please contact the clinic through MyChart or phone. If you have a critical or abnormal lab result, we will notify you by phone as soon as possible.  Submit refill requests through [employer] or call your pharmacy and they will forward the refill request to us. Please allow 3 business days for your refill to be completed.          Additional Information About Your Visit        MyChart Information     [employer] gives you secure access to your electronic health record. If you see a primary care provider, you can also send messages to your care team and make appointments. If you have questions, please call your primary care clinic.  If you do not have a primary care provider, please call [phone] and they will assist you.        Care EveryWhere ID     This is your Care EveryWhere ID. This could be used by other organizations to access your  "[place] medical records  [id number]        Your Vitals Were     Pulse Temperature Respirations Height Pulse Oximetry BMI (Body Mass Index)    96 97.6  F (36.4  C) (Tympanic) 18 1.499 m (4' 11.02\") 98% 20.99 kg/m2       Blood Pressure from Last 3 Encounters:   06/22/18 120/78   05/17/18 131/67   04/20/18 138/70    Weight from Last 3 Encounters:   06/22/18 47.2 kg (104 lb)   05/17/18 46.8 kg (103 lb 3.2 oz)   04/20/18 47 kg (103 lb 9.6 oz)              We Performed the Following     CBC with platelets differential     Comprehensive metabolic panel     Lactate Dehydrogenase        Primary Care Provider Office Phone # Fax #    Zheng De La Rosa [phone] [address] [id number]        Equal Access to Services     [facility]: Hadii charity daviso Sohosea, waaxda luqadaha, qaybta kaalmada layneyalisha, apolinar mukherjee . So [facility] [phone].    ATENCIÓN: Si habla español, tiene a darden disposición servicios gratuitos de asistencia lingüística. Llame al [phone].    We comply with applicable federal civil rights laws and [place] laws. We do not discriminate on the basis of race, color, national origin, age, disability, sex, sexual orientation, or gender identity.            Thank you!     Thank you for choosing [facility]  for your care. Our goal is always to provide you with excellent care. Hearing back from our patients is one way we can continue to improve our services. Please take a few minutes to complete the written survey that you may receive in the mail after your visit with us. Thank you!             Your Updated Medication List - Protect others around you: Learn how to safely use, store and throw away your medicines at www.disposemymeds.org.          This list is accurate as of 6/22/18  8:52 AM.  Always use your most recent med list.                   Brand Name Dispense Instructions for use Diagnosis    alendronate 70 MG tablet    " FOSAMAX    12 tablet    TAKE 1 TABLET BY MOUTH EVERY WEEK IN THE MORNING; AT LEAST 30 MINUTES BEFORE THE FIRST FOOD, BEVERAGE OR MEDICATION OF THE DAY.    Osteoporosis, unspecified osteoporosis type, unspecified pathological fracture presence       calcium gluconate 500 MG Tabs tablet      Take 1,200 mg by mouth daily    Chronic lymphoid leukemia, 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       lidocaine-prilocaine cream    EMLA    30 g    Apply topically as needed for moderate pain Apply to port site 1 hour before chemotherapy appointment    Chronic lymphocytic leukemia in remission (H)       VITAMIN D3 PO      Take by mouth daily Reported on 5/1/2017    Chronic lymphoid leukemia, 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

## 2018-06-22 NOTE — NURSING NOTE
"Chief Complaint   Patient presents with     RECHECK     Follow up Chronic lymphocytic leukemia        Initial /78  Pulse 96  Temp 97.6  F (36.4  C) (Tympanic)  Resp 18  Ht 1.499 m (4' 11\")  Wt 47.2 kg (104 lb)  SpO2 98%  BMI 21.01 kg/m2 Estimated body mass index is 21.01 kg/(m^2) as calculated from the following:    Height as of this encounter: 1.499 m (4' 11\").    Weight as of this encounter: 47.2 kg (104 lb).  Medication Reconciliation: complete   Immunizations reviewed, advanced directives on file, pain = 0, PHQ9 = 0.    Ana Tate LPN    "

## 2018-06-22 NOTE — MR AVS SNAPSHOT
After Visit Summary   6/22/2018    Jennifer Barajas    MRN: [id number]           Patient Information     Date Of Birth          1940        Visit Information        Provider Department      6/22/2018 9:00 AM Yaneli Gray NP [facility] [place]        Today's Diagnoses     Chronic lymphocytic leukemia (H)    -  1      Care Instructions    We would like to see you back in 4 months. Please come 30 minutes prior for lab work through your port.  We will set you up for monthly port flushes.  If you have any questions please call [phone]  Other instructions:  none          Follow-ups after your visit        Your next 10 appointments already scheduled     Jul 23, 2018  8:30 AM CDT   Level O with HC INF RM 3316   [facility] [place] ([facility] [place] )    [address]  [place] [address]   [phone]            Aug 23, 2018  8:30 AM CDT   Level O with HC INF RM 3308   [facility] [place] ([facility] [place] )    [address]  [place] [address]   [phone]            Sep 24, 2018  8:30 AM CDT   Level O with HC INF RM 3310   [facility] [place] ([facility] [place] )    [address]  [place] [address]   [phone]            Oct 23, 2018  8:30 AM CDT   Level O with HC INF RM 3302   [facility] [place] ([facility] [place] )    [address]  [place] [address]   [phone]            Oct 23, 2018  9:00 AM CDT   (Arrive by 8:45 AM)   Return Visit with Yaneli Gray NP   [facility] [place] ([facility] [place] )    [address]  [place] [address]   [phone]              Future tests that were ordered for you today     Open Future Orders        Priority Expected Expires Ordered    CBC with platelets differential Routine 10/22/2018 11/22/2018 6/22/2018    Comprehensive metabolic panel Routine 10/22/2018 11/22/2018 6/22/2018    Lactate Dehydrogenase Routine  "10/22/2018 11/22/2018 6/22/2018            Who to contact     If you have questions or need follow up information about today's clinic visit or your schedule please contact [facility] directly at [phone].  Normal or non-critical lab and imaging results will be communicated to you by MyChart, letter or phone within 4 business days after the clinic has received the results. If you do not hear from us within 7 days, please contact the clinic through [employer] or phone. If you have a critical or abnormal lab result, we will notify you by phone as soon as possible.  Submit refill requests through [employer] or call your pharmacy and they will forward the refill request to us. Please allow 3 business days for your refill to be completed.          Additional Information About Your Visit        PayByGroupharGOVECS Information     [employer] gives you secure access to your electronic health record. If you see a primary care provider, you can also send messages to your care team and make appointments. If you have questions, please call your primary care clinic.  If you do not have a primary care provider, please call [phone] and they will assist you.        Care EveryWhere ID     This is your Care EveryWhere ID. This could be used by other organizations to access your [place] medical records  [id number]        Your Vitals Were     Pulse Temperature Respirations Height Pulse Oximetry BMI (Body Mass Index)    96 97.6  F (36.4  C) (Tympanic) 18 1.499 m (4' 11\") 98% 21.01 kg/m2       Blood Pressure from Last 3 Encounters:   06/22/18 120/78   06/22/18 120/78   05/17/18 131/67    Weight from Last 3 Encounters:   06/22/18 47.2 kg (104 lb)   06/22/18 47.2 kg (104 lb)   05/17/18 46.8 kg (103 lb 3.2 oz)               Primary Care Provider Office Phone # Fax #    Zheng De La Rosa [phone]       [facility] [address]        Equal Access to Services     [facility] AH: Leno mckenzie " Nyasia, berylda lugiaadaha, sunny kateddy capps, apolinar janein hayaan trentzelalem howardamador laBrittnykendell kirstie. So [facility] [phone].    ATENCIÓN: Si nick elder, tiene a darden disposición servicios gratuitos de asistencia lingüística. Alin al [phone].    We comply with applicable federal civil rights laws and [place] laws. We do not discriminate on the basis of race, color, national origin, age, disability, sex, sexual orientation, or gender identity.            Thank you!     [facility]  [facility]. Our goal is always to provide you with excellent care. Hearing back from our patients is one way we can continue to improve our services. Please take a few minutes to complete the written survey that you may receive in the mail after your visit with us. Thank you!             Your Updated Medication List - Protect others around you: Learn how to safely use, store and throw away your medicines at www.disposemymeds.org.          This list is accurate as of 6/22/18  9:20 AM.  Always use your most recent med list.                   Brand Name Dispense Instructions for use Diagnosis    alendronate 70 MG tablet    FOSAMAX    12 tablet    TAKE 1 TABLET BY MOUTH EVERY WEEK IN THE MORNING; AT LEAST 30 MINUTES BEFORE THE FIRST FOOD, BEVERAGE OR MEDICATION OF THE DAY.    Osteoporosis, unspecified osteoporosis type, unspecified pathological fracture presence       calcium gluconate 500 MG Tabs tablet      Take 1,200 mg by mouth daily    Chronic lymphoid leukemia, 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       lidocaine-prilocaine cream    EMLA    30 g    Apply topically as needed for moderate pain Apply to port site 1 hour before chemotherapy appointment    Chronic lymphocytic leukemia in remission (H)       VITAMIN D3 PO      Take by mouth daily Reported on 5/1/2017    Chronic lymphoid leukemia, 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

## 2018-06-23 ASSESSMENT — PATIENT HEALTH QUESTIONNAIRE - PHQ9: SUM OF ALL RESPONSES TO PHQ QUESTIONS 1-9: 0

## 2018-07-23 ENCOUNTER — INFUSION THERAPY VISIT (OUTPATIENT)
Dept: INFUSION THERAPY | Facility: OTHER | Age: 78
End: 2018-07-23
Attending: NURSE PRACTITIONER
Payer: MEDICARE

## 2018-07-23 VITALS
OXYGEN SATURATION: 98 % | HEART RATE: 71 BPM | RESPIRATION RATE: 16 BRPM | TEMPERATURE: 97.4 F | SYSTOLIC BLOOD PRESSURE: 138 MMHG | DIASTOLIC BLOOD PRESSURE: 70 MMHG

## 2018-07-23 DIAGNOSIS — C91.10 CHRONIC LYMPHOCYTIC LEUKEMIA (H): Primary | ICD-10-CM

## 2018-07-23 PROCEDURE — 96523 IRRIG DRUG DELIVERY DEVICE: CPT

## 2018-07-23 PROCEDURE — 25000128 H RX IP 250 OP 636: Performed by: INTERNAL MEDICINE

## 2018-07-23 RX ORDER — HEPARIN SODIUM (PORCINE) LOCK FLUSH IV SOLN 100 UNIT/ML 100 UNIT/ML
500 SOLUTION INTRAVENOUS EVERY 8 HOURS
Status: CANCELLED
Start: 2018-07-23

## 2018-07-23 RX ORDER — HEPARIN SODIUM (PORCINE) LOCK FLUSH IV SOLN 100 UNIT/ML 100 UNIT/ML
500 SOLUTION INTRAVENOUS EVERY 8 HOURS
Status: DISCONTINUED | OUTPATIENT
Start: 2018-07-23 | End: 2018-07-23 | Stop reason: HOSPADM

## 2018-07-23 RX ADMIN — SODIUM CHLORIDE, PRESERVATIVE FREE 500 UNITS: 5 INJECTION INTRAVENOUS at 08:31

## 2018-07-23 NOTE — MR AVS SNAPSHOT
After Visit Summary   7/23/2018    Jennifer Barajas    MRN: [id number]           Patient Information     Date Of Birth          1940        Visit Information        Provider Department      7/23/2018 8:30 AM HC INF RM 3316 [facility] [place]        Today's Diagnoses     Chronic lymphocytic leukemia (H)    -  1      Care Instructions    We will see you back in 4 weeks for your next port flush.  If you have any questions, please call [phone].  If you need a refill, please contact your pharmacy.            Follow-ups after your visit        Your next 10 appointments already scheduled     Aug 23, 2018  8:30 AM CDT   Level O with HC INF RM 3308   [facility] [place] ([facility] [place] )    [address]  [place] [address]   [phone]            Sep 24, 2018  8:30 AM CDT   Level O with HC INF RM 3310   [facility] [place] ([facility] [place] )    [address]  [place] [address]   [phone]            Oct 23, 2018  8:30 AM CDT   Level O with HC INF RM 3302   [facility] [place] ([facility] [place] )    [address]  [place] [address]   [phone]            Oct 23, 2018  9:00 AM CDT   (Arrive by 8:45 AM)   Return Visit with Yaneli Gray NP   [facility] [place] )    [address]  [place] [address]   [phone]              Who to contact     If you have questions or need follow up information about today's clinic visit or your schedule please contact [facility] directly at [phone].  Normal or non-critical lab and imaging results will be communicated to you by MyChart, letter or phone within 4 business days after the clinic has received the results. If you do not hear from us within 7 days, please contact the clinic through MyChart or phone. If you have a critical or abnormal lab result, we will notify you by phone as soon as possible.  Submit refill  requests through [employer] or call your pharmacy and they will forward the refill request to us. Please allow 3 business days for your refill to be completed.          Additional Information About Your Visit        PayDragonhart Information     [employer] gives you secure access to your electronic health record. If you see a primary care provider, you can also send messages to your care team and make appointments. If you have questions, please call your primary care clinic.  If you do not have a primary care provider, please call [phone] and they will assist you.        Care EveryWhere ID     This is your Care EveryWhere ID. This could be used by other organizations to access your [place] medical records  [id number]        Your Vitals Were     Pulse Temperature Respirations Pulse Oximetry          71 97.4  F (36.3  C) (Oral) 16 98%         Blood Pressure from Last 3 Encounters:   07/23/18 138/70   06/22/18 120/78   06/22/18 120/78    Weight from Last 3 Encounters:   06/22/18 47.2 kg (104 lb)   06/22/18 47.2 kg (104 lb)   05/17/18 46.8 kg (103 lb 3.2 oz)              Today, you had the following     No orders found for display       Primary Care Provider Office Phone # Fax #    Zheng De La Rosa [phone]       [facility] [id number]        Equal Access to Services     [facility] MKAAYLA : Hadii charity hernandez hadasho Sohosea, waaxda luqadaha, qaybta kaalmada adeegyada, apolinar mukherjee . So [facility] [phone].    ATENCIÓN: Si habla español, tiene a darden disposición servicios gratuitos de asistencia lingüística. Llame al [phone].    We comply with applicable federal civil rights laws and [place] laws. We do not discriminate on the basis of race, color, national origin, age, disability, sex, sexual orientation, or gender identity.            Thank you!     Thank you for choosing [facility]  for your care. Our goal is always to provide you with excellent care.  Hearing back from our patients is one way we can continue to improve our services. Please take a few minutes to complete the written survey that you may receive in the mail after your visit with us. Thank you!             Your Updated Medication List - Protect others around you: Learn how to safely use, store and throw away your medicines at www.disposemymeds.org.          This list is accurate as of 7/23/18  8:49 AM.  Always use your most recent med list.                   Brand Name Dispense Instructions for use Diagnosis    alendronate 70 MG tablet    FOSAMAX    12 tablet    TAKE 1 TABLET BY MOUTH EVERY WEEK IN THE MORNING; AT LEAST 30 MINUTES BEFORE THE FIRST FOOD, BEVERAGE OR MEDICATION OF THE DAY.    Osteoporosis, unspecified osteoporosis type, unspecified pathological fracture presence       calcium gluconate 500 MG Tabs tablet      Take 1,200 mg by mouth daily    Chronic lymphoid leukemia, 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       lidocaine-prilocaine cream    EMLA    30 g    Apply topically as needed for moderate pain Apply to port site 1 hour before chemotherapy appointment    Chronic lymphocytic leukemia in remission (H)       VITAMIN D3 PO      Take by mouth daily Reported on 5/1/2017    Chronic lymphoid leukemia, 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

## 2018-07-23 NOTE — PATIENT INSTRUCTIONS
We will see you back in 4 weeks for your next port flush.  If you have any questions, please call [phone].  If you need a refill, please contact your pharmacy.

## 2018-08-23 ENCOUNTER — INFUSION THERAPY VISIT (OUTPATIENT)
Dept: INFUSION THERAPY | Facility: OTHER | Age: 78
End: 2018-08-23
Attending: NURSE PRACTITIONER
Payer: MEDICARE

## 2018-08-23 VITALS
TEMPERATURE: 97.6 F | HEIGHT: 59 IN | HEART RATE: 95 BPM | BODY MASS INDEX: 21 KG/M2 | DIASTOLIC BLOOD PRESSURE: 74 MMHG | SYSTOLIC BLOOD PRESSURE: 147 MMHG | RESPIRATION RATE: 16 BRPM | WEIGHT: 104.2 LBS | OXYGEN SATURATION: 99 %

## 2018-08-23 DIAGNOSIS — C91.10 CHRONIC LYMPHOCYTIC LEUKEMIA (H): Primary | ICD-10-CM

## 2018-08-23 PROCEDURE — 96523 IRRIG DRUG DELIVERY DEVICE: CPT

## 2018-08-23 PROCEDURE — 25000128 H RX IP 250 OP 636: Performed by: INTERNAL MEDICINE

## 2018-08-23 RX ORDER — HEPARIN SODIUM (PORCINE) LOCK FLUSH IV SOLN 100 UNIT/ML 100 UNIT/ML
500 SOLUTION INTRAVENOUS EVERY 8 HOURS
Status: DISCONTINUED | OUTPATIENT
Start: 2018-08-23 | End: 2018-08-23 | Stop reason: HOSPADM

## 2018-08-23 RX ORDER — HEPARIN SODIUM (PORCINE) LOCK FLUSH IV SOLN 100 UNIT/ML 100 UNIT/ML
500 SOLUTION INTRAVENOUS EVERY 8 HOURS
Status: CANCELLED
Start: 2018-08-23

## 2018-08-23 RX ADMIN — HEPARIN 500 UNITS: 100 SYRINGE at 08:25

## 2018-08-23 NOTE — MR AVS SNAPSHOT
After Visit Summary   8/23/2018    Jennifer Barajas    MRN: [id number]           Patient Information     Date Of Birth          1940        Visit Information        Provider Department      8/23/2018 8:30 AM HC INF RM 3308 [facility]        Today's Diagnoses     Chronic lymphocytic leukemia (H)    -  1      Care Instructions    We will see you back as planned.           Follow-ups after your visit        Your next 10 appointments already scheduled     Sep 24, 2018  8:30 AM CDT   Level O with HC INF RM 3310   [facility] [place] ([facility] [place] )    [address]  [place] [address]   [phone]            Oct 23, 2018  8:30 AM CDT   Level O with HC INF RM 3302   [facility] [place] ([facility] [place] )    [address]  [place] [address]   [phone]            Oct 23, 2018  9:20 AM CDT   (Arrive by 9:05 AM)   Return Visit with Yaneli Gray NP   [facility] ([facility] [place] )    [address]  [place] [address]   [phone]              Who to contact     If you have questions or need follow up information about today's clinic visit or your schedule please contact [facility] directly at [phone].  Normal or non-critical lab and imaging results will be communicated to you by MyChart, letter or phone within 4 business days after the clinic has received the results. If you do not hear from us within 7 days, please contact the clinic through MyChart or phone. If you have a critical or abnormal lab result, we will notify you by phone as soon as possible.  Submit refill requests through [employer] or call your pharmacy and they will forward the refill request to us. Please allow 3 business days for your refill to be completed.          Additional Information About Your Visit        Qoturehart Information     [employer] gives you secure access to your electronic health record. If you see a  "primary care provider, you can also send messages to your care team and make appointments. If you have questions, please call your primary care clinic.  If you do not have a primary care provider, please call [phone] and they will assist you.        Care EveryWhere ID     This is your Care EveryWhere ID. This could be used by other organizations to access your [place] medical records  [id number]        Your Vitals Were     Pulse Temperature Respirations Height Pulse Oximetry BMI (Body Mass Index)    95 97.6  F (36.4  C) (Oral) 16 1.499 m (4' 11.02\") 99% 21.03 kg/m2       Blood Pressure from Last 3 Encounters:   08/23/18 147/74   07/23/18 138/70   06/22/18 120/78    Weight from Last 3 Encounters:   08/23/18 47.3 kg (104 lb 3.2 oz)   06/22/18 47.2 kg (104 lb)   06/22/18 47.2 kg (104 lb)              Today, you had the following     No orders found for display       Primary Care Provider Office Phone # Fax #    Zheng De La Rosa [phone]       [facility] [address]        Equal Access to Services     [facility]: Hadii charity daviso Sohosea, waaxda luqadaha, qaybta kaalmada adeegyada, apolinar thacker. So [facility] [phone].    ATENCIÓN: Si habla español, tiene a darden disposición servicios gratuitos de asistencia lingüística. [facility] [phone].    We comply with applicable federal civil rights laws and [place] laws. We do not discriminate on the basis of race, color, national origin, age, disability, sex, sexual orientation, or gender identity.            Thank you!     Thank you for choosing [facility]  for your care. Our goal is always to provide you with excellent care. Hearing back from our patients is one way we can continue to improve our services. Please take a few minutes to complete the written survey that you may receive in the mail after your visit with us. Thank you!             Your Updated Medication List - Protect " others around you: Learn how to safely use, store and throw away your medicines at www.disposemymeds.org.          This list is accurate as of 8/23/18  8:57 AM.  Always use your most recent med list.                   Brand Name Dispense Instructions for use Diagnosis    alendronate 70 MG tablet    FOSAMAX    12 tablet    TAKE 1 TABLET BY MOUTH EVERY WEEK IN THE MORNING; AT LEAST 30 MINUTES BEFORE THE FIRST FOOD, BEVERAGE OR MEDICATION OF THE DAY.    Osteoporosis, unspecified osteoporosis type, unspecified pathological fracture presence       calcium gluconate 500 MG Tabs tablet      Take 1,200 mg by mouth daily    Chronic lymphoid leukemia, 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       lidocaine-prilocaine cream    EMLA    30 g    Apply topically as needed for moderate pain Apply to port site 1 hour before chemotherapy appointment    Chronic lymphocytic leukemia in remission (H)       VITAMIN D3 PO      Take by mouth daily Reported on 5/1/2017    Chronic lymphoid leukemia, 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

## 2018-08-23 NOTE — PROGRESS NOTES
Hand hygiene performed: yes   Mask donned by caregiver: yes   Site prepped with CHG: yes   Labs drawn: no   Dressing applied using aseptic technique: yes     Patients left sided port accessed using non-coring, 22 gauge, 3/4 inch needle. Port accessed per facility protocol. Port flushed easily, blood return noted.  No signs and symptoms of infection or infiltration.  Port flushed 10 mLs Normal Saline then Heparin 5mLs of 100 unit/mL.  Needle removed, small dressing applied.  Patient discharged with no complaints.  Declined AVS but did give her an updated calender as appointment times changed in October per report.

## 2018-09-24 ENCOUNTER — INFUSION THERAPY VISIT (OUTPATIENT)
Dept: INFUSION THERAPY | Facility: OTHER | Age: 78
End: 2018-09-24
Attending: NURSE PRACTITIONER
Payer: MEDICARE

## 2018-09-24 VITALS
WEIGHT: 101.2 LBS | TEMPERATURE: 96.4 F | BODY MASS INDEX: 20.4 KG/M2 | DIASTOLIC BLOOD PRESSURE: 74 MMHG | SYSTOLIC BLOOD PRESSURE: 138 MMHG | OXYGEN SATURATION: 98 % | HEART RATE: 71 BPM | HEIGHT: 59 IN | RESPIRATION RATE: 18 BRPM

## 2018-09-24 DIAGNOSIS — C91.10 CHRONIC LYMPHOCYTIC LEUKEMIA (H): Primary | ICD-10-CM

## 2018-09-24 PROCEDURE — 96523 IRRIG DRUG DELIVERY DEVICE: CPT

## 2018-09-24 PROCEDURE — 25000128 H RX IP 250 OP 636: Performed by: INTERNAL MEDICINE

## 2018-09-24 RX ORDER — HEPARIN SODIUM (PORCINE) LOCK FLUSH IV SOLN 100 UNIT/ML 100 UNIT/ML
500 SOLUTION INTRAVENOUS EVERY 8 HOURS
Status: CANCELLED
Start: 2018-09-24

## 2018-09-24 RX ORDER — HEPARIN SODIUM (PORCINE) LOCK FLUSH IV SOLN 100 UNIT/ML 100 UNIT/ML
500 SOLUTION INTRAVENOUS EVERY 8 HOURS
Status: DISCONTINUED | OUTPATIENT
Start: 2018-09-24 | End: 2018-09-24 | Stop reason: HOSPADM

## 2018-09-24 RX ADMIN — HEPARIN 500 UNITS: 100 SYRINGE at 08:28

## 2018-09-24 NOTE — PROGRESS NOTES
Hand hygiene performed: yes   Mask donned by caregiver: yes   Site prepped with CHG: yes   Labs drawn: no   Dressing applied using aseptic technique: yes     Patients left sided port accessed using non-coring, 22 gauge, 3/4 inch needle. Port accessed per facility protocol. Port flushed easily, blood return noted.  No signs and symptoms of infection or infiltration.  Port flushed 10 mLs Normal Saline then Heparin 5mLs of 100 unit/mL.  Needle removed, small dressing applied.  Patient discharged with no complaints.  Declined AVS.

## 2018-09-24 NOTE — MR AVS SNAPSHOT
After Visit Summary   9/24/2018    Jennifer Barajas    MRN: [id number]           Patient Information     Date Of Birth          1940        Visit Information        Provider Department      9/24/2018 8:30 AM HC INF RM 3310 [facility]        Today's Diagnoses     Chronic lymphocytic leukemia (H)    -  1       Follow-ups after your visit        Your next 10 appointments already scheduled     Oct 23, 2018  8:30 AM CDT   Level O with HC INF RM [id number]   [facility] [place] ([facility] [place] )    [address]  [place] [address]   [phone]            Oct 23, 2018  9:20 AM CDT   (Arrive by 9:05 AM)   Return Visit with Yaneli Gray NP   [facility] ([facility] )    [address]  [place] [address]   [phone]              Who to contact     If you have questions or need follow up information about today's clinic visit or your schedule please contact [facility] directly at [phone].  Normal or non-critical lab and imaging results will be communicated to you by Em[employer], letter or phone within 4 business days after the clinic has received the results. If you do not hear from us within 7 days, please contact the clinic through Emefcyhart or phone. If you have a critical or abnormal lab result, we will notify you by phone as soon as possible.  Submit refill requests through [employer] or call your pharmacy and they will forward the refill request to us. Please allow 3 business days for your refill to be completed.          Additional Information About Your Visit        MyChart Information     [employer] gives you secure access to your electronic health record. If you see a primary care provider, you can also send messages to your care team and make appointments. If you have questions, please call your primary care clinic.  If you do not have a primary care provider, please call  "[phone] and they will assist you.        Care EveryWhere ID     This is your Care EveryWhere ID. This could be used by other organizations to access your [facility] medical records  [id number]        Your Vitals Were     Pulse Temperature Respirations Height Pulse Oximetry BMI (Body Mass Index)    71 96.4  F (35.8  C) (Oral) 18 1.499 m (4' 11.02\") 98% 20.43 kg/m2       Blood Pressure from Last 3 Encounters:   09/24/18 138/74   08/23/18 147/74   07/23/18 138/70    Weight from Last 3 Encounters:   09/24/18 45.9 kg (101 lb 3.2 oz)   08/23/18 47.3 kg (104 lb 3.2 oz)   06/22/18 47.2 kg (104 lb)              Today, you had the following     No orders found for display       Primary Care Provider Office Phone # Fax #    Zheng De La Rosa [phone]       [facility] [place] [address]        Equal Access to Services     [facility]: [employer] hernandez hadasho Soomaali, waaxda luqadaha, qaybta kaalmada adeegyada, apolinar mukherjee . So [facility] [phone].    ATENCIÓN: Si habla español, tiene a darden disposición servicios gratuitos de asistencia lingüística. Llame al [phone].    We comply with applicable federal civil rights laws and [place] laws. We do not discriminate on the basis of race, color, national origin, age, disability, sex, sexual orientation, or gender identity.            Thank you!     Thank you for choosing [facility]  for your care. Our goal is always to provide you with excellent care. Hearing back from our patients is one way we can continue to improve our services. Please take a few minutes to complete the written survey that you may receive in the mail after your visit with us. Thank you!             Your Updated Medication List - Protect others around you: Learn how to safely use, store and throw away your medicines at www.disposemymeds.org.          This list is accurate as of 9/24/18  9:17 AM.  Always use your most recent med " list.                   Brand Name Dispense Instructions for use Diagnosis    alendronate 70 MG tablet    FOSAMAX    12 tablet    TAKE 1 TABLET BY MOUTH EVERY WEEK IN THE MORNING; AT LEAST 30 MINUTES BEFORE THE FIRST FOOD, BEVERAGE OR MEDICATION OF THE DAY.    Osteoporosis, unspecified osteoporosis type, unspecified pathological fracture presence       calcium gluconate 500 MG Tabs tablet      Take 1,200 mg by mouth daily    Chronic lymphoid leukemia, 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       lidocaine-prilocaine cream    EMLA    30 g    Apply topically as needed for moderate pain Apply to port site 1 hour before chemotherapy appointment    Chronic lymphocytic leukemia in remission (H)       VITAMIN D3 PO      Take by mouth daily Reported on 5/1/2017    Chronic lymphoid leukemia, 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

## 2018-10-23 ENCOUNTER — INFUSION THERAPY VISIT (OUTPATIENT)
Dept: INFUSION THERAPY | Facility: OTHER | Age: 78
End: 2018-10-23
Attending: NURSE PRACTITIONER
Payer: MEDICARE

## 2018-10-23 ENCOUNTER — ONCOLOGY VISIT (OUTPATIENT)
Dept: ONCOLOGY | Facility: OTHER | Age: 78
End: 2018-10-23
Attending: NURSE PRACTITIONER
Payer: MEDICARE

## 2018-10-23 VITALS
SYSTOLIC BLOOD PRESSURE: 124 MMHG | HEIGHT: 59 IN | BODY MASS INDEX: 20.72 KG/M2 | TEMPERATURE: 96.1 F | WEIGHT: 102.8 LBS | DIASTOLIC BLOOD PRESSURE: 78 MMHG | OXYGEN SATURATION: 97 % | HEART RATE: 94 BPM | RESPIRATION RATE: 18 BRPM

## 2018-10-23 VITALS
HEART RATE: 94 BPM | DIASTOLIC BLOOD PRESSURE: 78 MMHG | BODY MASS INDEX: 20.72 KG/M2 | WEIGHT: 102.8 LBS | HEIGHT: 59 IN | SYSTOLIC BLOOD PRESSURE: 124 MMHG | RESPIRATION RATE: 18 BRPM | OXYGEN SATURATION: 97 % | TEMPERATURE: 96.1 F

## 2018-10-23 DIAGNOSIS — Z12.11 SCREENING FOR MALIGNANT NEOPLASM OF COLON: ICD-10-CM

## 2018-10-23 DIAGNOSIS — C91.10 CHRONIC LYMPHOCYTIC LEUKEMIA (H): Primary | ICD-10-CM

## 2018-10-23 DIAGNOSIS — Z23 NEED FOR PROPHYLACTIC VACCINATION AND INOCULATION AGAINST INFLUENZA: ICD-10-CM

## 2018-10-23 LAB
ALBUMIN SERPL-MCNC: 4.1 G/DL (ref 3.4–5)
ALP SERPL-CCNC: 71 U/L (ref 40–150)
ALT SERPL W P-5'-P-CCNC: 29 U/L (ref 0–50)
ANION GAP SERPL CALCULATED.3IONS-SCNC: 5 MMOL/L (ref 3–14)
AST SERPL W P-5'-P-CCNC: 35 U/L (ref 0–45)
BASOPHILS # BLD AUTO: 0 10E9/L (ref 0–0.2)
BASOPHILS NFR BLD AUTO: 0.3 %
BILIRUB SERPL-MCNC: 0.8 MG/DL (ref 0.2–1.3)
BUN SERPL-MCNC: 17 MG/DL (ref 7–30)
CALCIUM SERPL-MCNC: 9 MG/DL (ref 8.5–10.1)
CHLORIDE SERPL-SCNC: 101 MMOL/L (ref 94–109)
CO2 SERPL-SCNC: 30 MMOL/L (ref 20–32)
CREAT SERPL-MCNC: 0.8 MG/DL (ref 0.52–1.04)
DIFFERENTIAL METHOD BLD: NORMAL
EOSINOPHIL # BLD AUTO: 0.1 10E9/L (ref 0–0.7)
EOSINOPHIL NFR BLD AUTO: 1.3 %
ERYTHROCYTE [DISTWIDTH] IN BLOOD BY AUTOMATED COUNT: 14 % (ref 10–15)
GFR SERPL CREATININE-BSD FRML MDRD: 70 ML/MIN/1.7M2
GLUCOSE SERPL-MCNC: 97 MG/DL (ref 70–99)
HCT VFR BLD AUTO: 42.4 % (ref 35–47)
HGB BLD-MCNC: 14.3 G/DL (ref 11.7–15.7)
IMM GRANULOCYTES # BLD: 0 10E9/L (ref 0–0.4)
IMM GRANULOCYTES NFR BLD: 0.2 %
LDH SERPL L TO P-CCNC: 224 U/L (ref 81–234)
LYMPHOCYTES # BLD AUTO: 1.2 10E9/L (ref 0.8–5.3)
LYMPHOCYTES NFR BLD AUTO: 19.5 %
MCH RBC QN AUTO: 29.5 PG (ref 26.5–33)
MCHC RBC AUTO-ENTMCNC: 33.7 G/DL (ref 31.5–36.5)
MCV RBC AUTO: 88 FL (ref 78–100)
MONOCYTES # BLD AUTO: 0.6 10E9/L (ref 0–1.3)
MONOCYTES NFR BLD AUTO: 9.4 %
NEUTROPHILS # BLD AUTO: 4.4 10E9/L (ref 1.6–8.3)
NEUTROPHILS NFR BLD AUTO: 69.3 %
NRBC # BLD AUTO: 0 10*3/UL
NRBC BLD AUTO-RTO: 0 /100
PLATELET # BLD AUTO: 239 10E9/L (ref 150–450)
POTASSIUM SERPL-SCNC: 3.6 MMOL/L (ref 3.4–5.3)
PROT SERPL-MCNC: 7.4 G/DL (ref 6.8–8.8)
RBC # BLD AUTO: 4.84 10E12/L (ref 3.8–5.2)
SODIUM SERPL-SCNC: 136 MMOL/L (ref 133–144)
WBC # BLD AUTO: 6.3 10E9/L (ref 4–11)

## 2018-10-23 PROCEDURE — G0008 ADMIN INFLUENZA VIRUS VAC: HCPCS | Performed by: NURSE PRACTITIONER

## 2018-10-23 PROCEDURE — 85025 COMPLETE CBC W/AUTO DIFF WBC: CPT | Mod: ZL | Performed by: NURSE PRACTITIONER

## 2018-10-23 PROCEDURE — 36415 COLL VENOUS BLD VENIPUNCTURE: CPT | Mod: ZL | Performed by: NURSE PRACTITIONER

## 2018-10-23 PROCEDURE — G0463 HOSPITAL OUTPT CLINIC VISIT: HCPCS

## 2018-10-23 PROCEDURE — 80053 COMPREHEN METABOLIC PANEL: CPT | Mod: ZL | Performed by: NURSE PRACTITIONER

## 2018-10-23 PROCEDURE — G0463 HOSPITAL OUTPT CLINIC VISIT: HCPCS | Mod: 25

## 2018-10-23 PROCEDURE — 99213 OFFICE O/P EST LOW 20 MIN: CPT | Performed by: NURSE PRACTITIONER

## 2018-10-23 PROCEDURE — 90662 IIV NO PRSV INCREASED AG IM: CPT | Performed by: NURSE PRACTITIONER

## 2018-10-23 PROCEDURE — 83615 LACTATE (LD) (LDH) ENZYME: CPT | Mod: ZL | Performed by: NURSE PRACTITIONER

## 2018-10-23 PROCEDURE — 96523 IRRIG DRUG DELIVERY DEVICE: CPT

## 2018-10-23 PROCEDURE — 25000128 H RX IP 250 OP 636: Performed by: INTERNAL MEDICINE

## 2018-10-23 RX ORDER — HEPARIN SODIUM (PORCINE) LOCK FLUSH IV SOLN 100 UNIT/ML 100 UNIT/ML
500 SOLUTION INTRAVENOUS EVERY 8 HOURS
Status: CANCELLED
Start: 2018-10-23

## 2018-10-23 RX ORDER — HEPARIN SODIUM (PORCINE) LOCK FLUSH IV SOLN 100 UNIT/ML 100 UNIT/ML
500 SOLUTION INTRAVENOUS EVERY 8 HOURS
Status: DISCONTINUED | OUTPATIENT
Start: 2018-10-23 | End: 2018-10-23 | Stop reason: HOSPADM

## 2018-10-23 RX ADMIN — HEPARIN 500 UNITS: 100 SYRINGE at 08:30

## 2018-10-23 ASSESSMENT — PAIN SCALES - GENERAL: PAINLEVEL: NO PAIN (0)

## 2018-10-23 NOTE — PROGRESS NOTES

## 2018-10-23 NOTE — NURSING NOTE
"Chief Complaint   Patient presents with     RECHECK     Follow up Chronic lymphocytic leukemia        Initial /78  Pulse 94  Temp 96.1  F (35.6  C) (Tympanic)  Resp 18  Ht 1.499 m (4' 11\")  Wt 46.6 kg (102 lb 12.8 oz)  SpO2 97%  BMI 20.76 kg/m2 Estimated body mass index is 20.76 kg/(m^2) as calculated from the following:    Height as of this encounter: 1.499 m (4' 11\").    Weight as of this encounter: 46.6 kg (102 lb 12.8 oz).  Medication Reconciliation: complete   Immunizations reviewed, advanced directives on file, pain = 0, PHQ9 = 0.    Ana Tate LPN    "

## 2018-10-23 NOTE — PROGRESS NOTES
Patients left sided port accessed using non-coring, 19 gauge, 3/4inch needle. Port accessed per facility protocol.  Hand hygiene performed: yes   Mask donned by caregiver: yes Site prepped with CHG: yes Labs drawn: yes Dressing applied using aseptic technique: yes Port flushed easily, without resistance. Flushed with 10 cc's normal saline.   Immediate blood return noted. 10 cc blood discarded.  2 vials blood draw and sent to lab for results.  Port flushed with 20 cc 0.9% normal saline and 5 cc heparinized saline.  Needle removed intact, sterile dressing applied.  Slight pressure applied for 30 seconds.   Patient tolerated port flush well, denies pain nor discomfort at this time. Patient instructed to leave dressing intact for a minimum of one hour, and to call with questions or concerns.  Patient states understanding and is in agreement with this plan. Declined AVS. Patient discharged ambulatory. Digna Chowdary

## 2018-10-23 NOTE — PROGRESS NOTES
Oncology Follow-up Visit:  October 23, 2018    Reason for Visit:  Patient presents with:  RECHECK: Follow up Chronic lymphocytic leukemia   Imm/Inj: [facility]     Nursing Note and documentation reviewed: yes    HPI:  This is a 78-year-old female patient who presents to the oncology/hematology clinic today in follow up of CLL diagnosed December 2011. She completed 6 cycles of Bendamustine July 2, 2015.   She is being followed with surveillance.    She presents to the clinic today once again telling me she is feeling good.  She does again have increased anxiety prior to having her lab work drawn for this appointment.  She does question immunizations and is requesting a flu shot today.  She denies any issues with new lumps or bumps, weight changes, increased fatigue, fevers, chills or night sweats.  States she did come down with upper respiratory issues including nasal congestion and chest congestion and cough in September and this lasted approximately 2 weeks.  She was not placed on antibiotic and did recover.    Oncologic History:  Jennifer was diagnosed with CLL in December 2011 after her primary care provider noted an elevated WBC on her annual exam. A peripheral blood flow for cytometry revealed she was CD5 positive, CD10 negative, consistent with CLL/SLL. Staging studies were completed and CT scan showed no evidence of lymphadenopathy or splenomegaly. She was asymptomatic with essentially stable lymphocyte count and followed with surveillance.      With patient's follow up in October 2014, hemoglobin was noted to be 10.7 with Hematocrit 30.0 and platelets 137,000. She was subsequently seen again 2 months later with further drop in hemoglobin to 8.6 with hematocrit 26.1 and platelets 140,000 with a white blood cell count of 13.5 and ANC 1.2, absolute lymphocytes were 12.1. A PET scan was completed on 12/11/2014 which did show mildly enlarged axillary lymph nodes with very mild abnormal hypermetabolism of SUV of 2 or  less. Bone marrow aspiration and biopsy was completed which revealed infiltration of CLL with 90% of cells confirmed to be CLL via flow morphologically. Patient was staged at least stage III CLL based on her anemia. The plan was to proceed with chemotherapy consisting of Rituxan and Fludara. Patient did experience hypersensitivity reaction with cycle 2 Rituxan therapy experiencing hypotension. This was discontinued and chemotherapy regime was changed to Bendamustine day 1 and day 2 every 28 days. She was started on 2/12/2015 and completed therapy in July 2015.      Current Chemo Regime/TX:  n/a  Current Cycle:  n/a  # of completed cycles:  n/a      Previous treatment: Rituxan/Fludara completing one full cycle and experienced hypersensitivity with Rituxan with second cycle;  Bendamustine 50 mg per metered squared days 1 and 2 every 28 days x6 cycles completed 7/2/15     Past Medical History:   Diagnosis Date     Chronic lymphoid leukemia, 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 2/28/2012     Diverticulosis of colon (without mention of hemorrhage) 11/29/2010     Family history of ischemic heart disease 11/29/2010     Family history of malignant neoplasm of gastrointestinal tract 10/14/2002     Non-toxic multinodular goiter 11/8/2016     Osteoporosis, unspecified 11/29/2010     Other and unspecified hyperlipidemia 11/28/2011     Viral warts, unspecified 11/28/2011       Past Surgical History:   Procedure Laterality Date     ------------OTHER-------------      elbow repair     COLONOSCOPY  01/12/2010    repeat in 2015     COLONOSCOPY       COLONOSCOPY       INSERT PORT VASCULAR ACCESS N/A 1/5/2015    Procedure: INSERT PORT VASCULAR ACCESS;  Surgeon: Linda Oliver MD;  Location: [facility]     ORTHOPEDIC SURGERY  1993    Left elbow repair       Family History   Problem Relation Age of Onset     Cancer Mother 97     colon cancer - cause of death     Colon Cancer Mother      Cancer Father      Colon Cancer  Father      Diabetes Brother      Obesity Brother      Breast Cancer Daughter      Hyperlipidemia Daughter      Thyroid Disease Daughter      Hypertension Son      Hyperlipidemia Son      Thyroid Disease Cousin      Hyperlipidemia Son      Coronary Artery Disease No family hx of      Cerebrovascular Disease No family hx of      Prostate Cancer No family hx of      Other Cancer No family hx of      Anesthesia Reaction No family hx of      Asthma No family hx of      Genetic Disorder No family hx of        Social History     Social History     Marital status:      Spouse name: N/A     Number of children: N/A     Years of education: N/A     Occupational History     [employer]      Social History Main Topics     Smoking status: Never Smoker     Smokeless tobacco: Never Used     Alcohol use No     Drug use: No     Sexual activity: No      Comment: devorced     Other Topics Concern     Parent/Sibling W/ Cabg, Mi Or Angioplasty Before 65f 55m? No      Service No     Blood Transfusions Yes     permits if needed     Caffeine Concern Yes     1 cup     Occupational Exposure No     Hobby Hazards No     Sleep Concern No     Stress Concern No     Weight Concern No     Special Diet No     Back Care No     Exercise No     Seat Belt Yes     Social History Narrative       Current Outpatient Prescriptions   Medication     alendronate (FOSAMAX) 70 MG tablet     Ascorbic Acid (VITAMIN C PO)     calcium gluconate 500 MG TABS     Cholecalciferol (VITAMIN D3 PO)     lidocaine-prilocaine (EMLA) cream     No current facility-administered medications for this visit.         Allergies   Allergen Reactions     Sulfa Drugs Rash     Rituxan [Rituximab]      Simvastatin Other (See Comments)     Takes pravastatin at home  Zocor - myalgia       Review Of Systems:  Constitutional: denies fever, weight changes, chills, and night sweats; no fatigue  Eyes: denies blurred or double vision  Ears/Nose/Throat: denies ear pain, nose problems,  "difficulty swallowing  Respiratory: denies shortness of breath, cough-see HPI  Skin: denies rash, lesions  Cardiovascular: denies chest pain, palpitations, edema  Gastrointestinal: denies abdominal pain, bloating, nausea, early satiety-stools are normal and no blood  Genitourinary: denies difficulty with urination, blood in urine  Musculoskeletal:denies new muscle pain, bone pain-hands and knees ache at times  Neurologic: denies lightheadedness, headaches, numbness or tingling  Psychiatric: denies anxiety, depression-when she comes for this appointment has some increased anxiety  Hematologic/Lymphatic/Immunologic: denies easy bruising, easy bleeding, lumps or bumps noted  Endocrine: Denies increased thirst      Physical Exam:  /78  Pulse 94  Temp 96.1  F (35.6  C) (Tympanic)  Resp 18  Ht 1.499 m (4' 11\")  Wt 46.6 kg (102 lb 12.8 oz)  SpO2 97%  BMI 20.76 kg/m2    GENERAL APPEARANCE: Healthy, alert and in no acute distress.  HEENT: Normocephalic, Sclerae anicteric. Oropharynx without ulcers, lesions, or thrush.  NECK: No asymmetry or masses, no thyromegaly.  LYMPHATICS: No palpable cervical, supraclavicular, axillary, or inguinal nodes   RESP: Lungs clear to auscultation bilaterally, respirations regular and easy  CARDIOVASCULAR: Regular rate and rhythm. Normal S1, S2; no murmur, gallop, or rub.  ABDOMEN: Soft, nontender. Bowel sounds auscultated all 4 quadrants. No palpable organomegaly or masses.  MUSCULOSKELETAL: Extremities without gross deformities noted. No edema of bilateral lower extremities.  NEURO: Alert and oriented x 3.  Gait steady.  PSYCHIATRIC: Mentation and affect appear normal.  Mood appropriate.    Laboratory:  Results for orders placed or performed in visit on 10/23/18   CBC with platelets differential   Result Value Ref Range    WBC 6.3 4.0 - 11.0 10e9/L    RBC Count 4.84 3.8 - 5.2 10e12/L    Hemoglobin 14.3 11.7 - 15.7 g/dL    Hematocrit 42.4 35.0 - 47.0 %    MCV 88 78 - 100 fl    MCH " 29.5 26.5 - 33.0 pg    MCHC 33.7 31.5 - 36.5 g/dL    RDW 14.0 10.0 - 15.0 %    Platelet Count 239 150 - 450 10e9/L    Diff Method Automated Method     % Neutrophils 69.3 %    % Lymphocytes 19.5 %    % Monocytes 9.4 %    % Eosinophils 1.3 %    % Basophils 0.3 %    % Immature Granulocytes 0.2 %    Nucleated RBCs 0 0 /100    Absolute Neutrophil 4.4 1.6 - 8.3 10e9/L    Absolute Lymphocytes 1.2 0.8 - 5.3 10e9/L    Absolute Monocytes 0.6 0.0 - 1.3 10e9/L    Absolute Eosinophils 0.1 0.0 - 0.7 10e9/L    Absolute Basophils 0.0 0.0 - 0.2 10e9/L    Abs Immature Granulocytes 0.0 0 - 0.4 10e9/L    Absolute Nucleated RBC 0.0    Comprehensive metabolic panel   Result Value Ref Range    Sodium 136 133 - 144 mmol/L    Potassium 3.6 3.4 - 5.3 mmol/L    Chloride 101 94 - 109 mmol/L    Carbon Dioxide 30 20 - 32 mmol/L    Anion Gap 5 3 - 14 mmol/L    Glucose 97 70 - 99 mg/dL    Urea Nitrogen 17 7 - 30 mg/dL    Creatinine 0.80 0.52 - 1.04 mg/dL    GFR Estimate 70 >60 mL/min/1.7m2    GFR Estimate If Black 85 >60 mL/min/1.7m2    Calcium 9.0 8.5 - 10.1 mg/dL    Bilirubin Total 0.8 0.2 - 1.3 mg/dL    Albumin 4.1 3.4 - 5.0 g/dL    Protein Total 7.4 6.8 - 8.8 g/dL    Alkaline Phosphatase 71 40 - 150 U/L    ALT 29 0 - 50 U/L    AST 35 0 - 45 U/L   Lactate Dehydrogenase   Result Value Ref Range    Lactate Dehydrogenase 224 81 - 234 U/L       Imaging Studies:  None for today        ASSESSMENT/PLAN:    #1 CLL/SLL: Diagnosed with CLL/SLL in December 2011. Stage III. She received 6 cycles of bendamustine completing this July 2015 and is now followed with surveillance.  Labs are good with normal LDH and patient is feeling well. She will follow up in 4 months with a CBC, CMP and LDH.     #2 Screening for malignant neoplasms of the colon: We did discuss that she is well overdue for colonoscopy that was recommended actually 2015 related to her family history.  She does not want to undergo another colonoscopy but is willing to take home and IFOBT.    I  recommended she follow-up with her PCP for a general physical and in order to discuss mammogram that will be due in March 2019 and also to discuss needed vaccines.  She is aware that she is unable to get the Shingles vaccine but she is able to get the new vaccine, Shingrix, if she so chooses.  Influenza vaccine was given today.     I encouraged patient to call with any questions or concerns.      Yaneli CHO, FNP-BC, AOCNP

## 2018-10-23 NOTE — PATIENT INSTRUCTIONS
We would like to see you back in 4 months. Please come 30 prior for lab work through your port and we will schedule you for monthly port flushes.  If you have any questions please call [phone]    Other instructions:    1.  Please do the test for blood in your stool and send it back in the mail  2.  Please make an appointment for a general physical with your PCP and discuss needed vaccines and plan  mammogram that is due after 3/22/2019

## 2018-10-23 NOTE — MR AVS SNAPSHOT
After Visit Summary   10/23/2018    Jennifer Barajas    MRN: [id number]           Patient Information     Date Of Birth          1940        Visit Information        Provider Department      10/23/2018 9:20 AM Yaneli Gray NP [facility] [place]        Today's Diagnoses     Chronic lymphocytic leukemia (H)    -  1    Need for prophylactic vaccination and inoculation against influenza        Screening for malignant neoplasm of colon          Care Instructions    We would like to see you back in 4 months. Please come 30 prior for lab work through your port and we will schedule you for monthly port flushes.  If you have any questions please call [phone]    Other instructions:    1.  Please do the test for blood in your stool and send it back in the mail  2.  Please make an appointment for a general physical with your PCP and discuss needed vaccines and plan  mammogram that is due after 3/22/2019          Follow-ups after your visit        Your next 10 appointments already scheduled     Nov 19, 2018  8:30 AM CST   Level O with HC INF RM 3304   [facility] [place] ([facility] [place] )    [address]  [place] [address]   [phone]            Nov 27, 2018  3:30 PM CST   (Arrive by 3:15 PM)   SHORT with Zheng De La Rosa MD   [facility] [place] ([facility] [place] )    [address]  [place] [address]   [phone]            Dec 20, 2018  8:30 AM CST   Level O with HC INF RM 3306   [facility] [place] ([facility] [place] )    [address]  [place] [address]   [phone]            Jan 21, 2019  8:30 AM CST   Level O with HC INF RM 3314   [facility] [place] ([facility] [place] )    [address]  [place] [address]   [phone]            Feb 21, 2019  8:30 AM CST   Level O with HC INF RM 3302   [facility] [place] ([facility] [place] )    [address]  [place] [address]   [phone]            Feb 21, 2019  9:20 AM CST   (Arrive by 9:05 AM)   Return Visit with Yaneli Gray NP   [facility] [place] ([facility] [place] )    [address] [place] [address]   [phone]              Future tests that were ordered for you today     Open Future Orders        Priority Expected Expires Ordered    CBC with platelets differential Routine 2/22/2019 10/22/2019 10/23/2018    Comprehensive metabolic panel Routine 2/22/2019 10/22/2019 10/23/2018    Lactate Dehydrogenase Routine 2/22/2019 10/22/2019 10/23/2018            Who to contact     If you have questions or need follow up information about today's clinic visit or your schedule please contact [facility] directly at [phone].  Normal or non-critical lab and imaging results will be communicated to you by MyChart, letter or phone within 4 business days after the clinic has received the results. If you do not hear from us within 7 days, please contact the clinic through [employer] or phone. If you have a critical or abnormal lab result, we will notify you by phone as soon as possible.  Submit refill requests through [employer] or call your pharmacy and they will forward the refill request to us. Please allow 3 business days for your refill to be completed.          Additional Information About Your Visit        MyChart Information     [employer] gives you secure access to your electronic health record. If you see a primary care provider, you can also send messages to your care team and make appointments. If you have questions, please call your primary care clinic.  If you do not have a primary care provider, please call [phone] and they will assist you.        Care EveryWhere ID     This is your Care EveryWhere ID. This could be used by other organizations to access your [place] medical records  [id number]        Your  "Vitals Were     Pulse Temperature Respirations Height Pulse Oximetry BMI (Body Mass Index)    94 96.1  F (35.6  C) (Tympanic) 18 1.499 m (4' 11\") 97% 20.76 kg/m2       Blood Pressure from Last 3 Encounters:   10/23/18 124/78   10/23/18 124/78   09/24/18 138/74    Weight from Last 3 Encounters:   10/23/18 46.6 kg (102 lb 12.8 oz)   10/23/18 46.6 kg (102 lb 12.8 oz)   09/24/18 45.9 kg (101 lb 3.2 oz)              We Performed the Following     ADMIN INFLUENZA (For MEDICARE Patients ONLY) []     HC FLU VACCINE, INCREASED ANTIGEN, PRESV FREE        Primary Care Provider Office Phone # Fax #    Zheng De La Rosa [phone] [address] [id number]        Equal Access to Services     [facility]: Hadii aad david hadasho Sohosea, waaxda luqadaha, qaybta kaalmada adeegyada, waxay janein haykendell mukherjee . So [facility] [phone].    ATENCIÓN: Si habla español, tiene a darden disposición servicios gratuitos de asistencia lingüística. Llame al [phone].    We comply with applicable federal civil rights laws and [place] laws. We do not discriminate on the basis of race, color, national origin, age, disability, sex, sexual orientation, or gender identity.            Thank you!     Thank you for choosing [facility]  for your care. Our goal is always to provide you with excellent care. Hearing back from our patients is one way we can continue to improve our services. Please take a few minutes to complete the written survey that you may receive in the mail after your visit with us. Thank you!             Your Updated Medication List - Protect others around you: Learn how to safely use, store and throw away your medicines at www.disposemymeds.org.          This list is accurate as of 10/23/18  9:24 AM.  Always use your most recent med list.                   Brand Name Dispense Instructions for use Diagnosis    alendronate 70 MG tablet    FOSAMAX    12 tablet "    TAKE 1 TABLET BY MOUTH EVERY WEEK IN THE MORNING; AT LEAST 30 MINUTES BEFORE THE FIRST FOOD, BEVERAGE OR MEDICATION OF THE DAY.    Osteoporosis, unspecified osteoporosis type, unspecified pathological fracture presence       calcium gluconate 500 MG Tabs tablet      Take 1,200 mg by mouth daily    Chronic lymphoid leukemia, 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       lidocaine-prilocaine cream    EMLA    30 g    Apply topically as needed for moderate pain Apply to port site 1 hour before chemotherapy appointment    Chronic lymphocytic leukemia in remission (H)       VITAMIN C PO      Take 500 mg by mouth daily        VITAMIN D3 PO      Take by mouth daily Reported on 5/1/2017    Chronic lymphoid leukemia, 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

## 2018-10-23 NOTE — MR AVS SNAPSHOT
After Visit Summary   10/23/2018    Jennifer Barajas    MRN: [id number]           Patient Information     Date Of Birth          1940        Visit Information        Provider Department      10/23/2018 8:30 AM HC INF RM 3312 [facility]        Today's Diagnoses     Chronic lymphocytic leukemia (H)    -  1      Care Instructions    We will see you back as planned.          Follow-ups after your visit        Your next 10 appointments already scheduled     Oct 23, 2018  9:20 AM CDT   (Arrive by 9:05 AM)   Return Visit with Yaneli Gray NP   [facility]    [address]  [place] [address]   [phone]              Who to contact     If you have questions or need follow up information about today's clinic visit or your schedule please contact [facility] directly at [phone].  Normal or non-critical lab and imaging results will be communicated to you by MyChart, letter or phone within 4 business days after the clinic has received the results. If you do not hear from us within 7 days, please contact the clinic through [employer] or phone. If you have a critical or abnormal lab result, we will notify you by phone as soon as possible.  Submit refill requests through [employer] or call your pharmacy and they will forward the refill request to us. Please allow 3 business days for your refill to be completed.          Additional Information About Your Visit        MyChart Information     [employer] gives you secure access to your electronic health record. If you see a primary care provider, you can also send messages to your care team and make appointments. If you have questions, please call your primary care clinic.  If you do not have a primary care provider, please call [phone] and they will assist you.        Care EveryWhere ID     This is your Care EveryWhere ID. This could be used by other  "organizations to access your [facility] medical records  [id number]        Your Vitals Were     Pulse Temperature Respirations Height Pulse Oximetry BMI (Body Mass Index)    94 96.1  F (35.6  C) (Tympanic) 18 1.499 m (4' 11.02\") 97% 20.75 kg/m2       Blood Pressure from Last 3 Encounters:   10/23/18 124/78   09/24/18 138/74   08/23/18 147/74    Weight from Last 3 Encounters:   10/23/18 46.6 kg (102 lb 12.8 oz)   09/24/18 45.9 kg (101 lb 3.2 oz)   08/23/18 47.3 kg (104 lb 3.2 oz)              We Performed the Following     CBC with platelets differential     Comprehensive metabolic panel     Lactate Dehydrogenase        Primary Care Provider Office Phone # Fax #    Zheng De La Rosa [phone] [address] [address]        Equal Access to Services     [facility] : Hadii aad ku hadasho Sohosea, waaxda luqadaha, qaybta kaalmada adeegyada, apolinar mukherjee . So [facility] [phone].    ATENCIÓN: Si habla español, tiene a darden disposición servicios gratuitos de asistencia lingüística. Llame al [phone].    We comply with applicable federal civil rights laws and [place] laws. We do not discriminate on the basis of race, color, national origin, age, disability, sex, sexual orientation, or gender identity.            Thank you!     Thank you for choosing [facility]  for your care. Our goal is always to provide you with excellent care. Hearing back from our patients is one way we can continue to improve our services. Please take a few minutes to complete the written survey that you may receive in the mail after your visit with us. Thank you!             Your Updated Medication List - Protect others around you: Learn how to safely use, store and throw away your medicines at www.disposemymeds.org.          This list is accurate as of 10/23/18  8:58 AM.  Always use your most recent med list.                   Brand Name Dispense Instructions " for use Diagnosis    alendronate 70 MG tablet    FOSAMAX    12 tablet    TAKE 1 TABLET BY MOUTH EVERY WEEK IN THE MORNING; AT LEAST 30 MINUTES BEFORE THE FIRST FOOD, BEVERAGE OR MEDICATION OF THE DAY.    Osteoporosis, unspecified osteoporosis type, unspecified pathological fracture presence       calcium gluconate 500 MG Tabs tablet      Take 1,200 mg by mouth daily    Chronic lymphoid leukemia, 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       lidocaine-prilocaine cream    EMLA    30 g    Apply topically as needed for moderate pain Apply to port site 1 hour before chemotherapy appointment    Chronic lymphocytic leukemia in remission (H)       VITAMIN C PO      Take 500 mg by mouth daily        VITAMIN D3 PO      Take by mouth daily Reported on 5/1/2017    Chronic lymphoid leukemia, 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

## 2018-10-24 ASSESSMENT — PATIENT HEALTH QUESTIONNAIRE - PHQ9: SUM OF ALL RESPONSES TO PHQ QUESTIONS 1-9: 0

## 2018-11-20 ENCOUNTER — INFUSION THERAPY VISIT (OUTPATIENT)
Dept: INFUSION THERAPY | Facility: OTHER | Age: 78
End: 2018-11-20
Attending: NURSE PRACTITIONER
Payer: MEDICARE

## 2018-11-20 VITALS
TEMPERATURE: 97.1 F | WEIGHT: 102.4 LBS | SYSTOLIC BLOOD PRESSURE: 107 MMHG | BODY MASS INDEX: 20.64 KG/M2 | OXYGEN SATURATION: 98 % | DIASTOLIC BLOOD PRESSURE: 68 MMHG | HEIGHT: 59 IN

## 2018-11-20 DIAGNOSIS — C91.10 CHRONIC LYMPHOCYTIC LEUKEMIA (H): Primary | ICD-10-CM

## 2018-11-20 PROCEDURE — 25000128 H RX IP 250 OP 636: Performed by: INTERNAL MEDICINE

## 2018-11-20 PROCEDURE — 96523 IRRIG DRUG DELIVERY DEVICE: CPT

## 2018-11-20 RX ORDER — HEPARIN SODIUM (PORCINE) LOCK FLUSH IV SOLN 100 UNIT/ML 100 UNIT/ML
500 SOLUTION INTRAVENOUS EVERY 8 HOURS
Status: DISCONTINUED | OUTPATIENT
Start: 2018-11-20 | End: 2018-11-20 | Stop reason: HOSPADM

## 2018-11-20 RX ORDER — HEPARIN SODIUM (PORCINE) LOCK FLUSH IV SOLN 100 UNIT/ML 100 UNIT/ML
500 SOLUTION INTRAVENOUS EVERY 8 HOURS
Status: CANCELLED
Start: 2018-11-20

## 2018-11-20 RX ADMIN — HEPARIN 500 UNITS: 100 SYRINGE at 08:15

## 2018-11-20 NOTE — MR AVS SNAPSHOT
After Visit Summary   11/20/2018    Jennifer Barajas    MRN: [id number]           Patient Information     Date Of Birth          1940        Visit Information        Provider Department      11/20/2018 8:00 AM HC INF RM 3306 [facility] [place]        Today's Diagnoses     Chronic lymphocytic leukemia (H)    -  1      Care Instructions    We will see you at your next schedled [facility] flush day.          Follow-ups after your visit        Your next 10 appointments already scheduled     Nov 27, 2018  3:30 PM CST   (Arrive by 3:15 PM)   SHORT with Zheng De La Rosa MD   [facility] [place] ([facility] [place] )    [address]  [place] [address]   [phone]            Dec 20, 2018  8:30 AM CST   Level O with HC INF RM 3306   [facility] [place] ([facility] [place] )    [address]  [place] [address]   [phone]            Jan 21, 2019  8:30 AM CST   Level O with HC INF RM 3314   [facility] [place] ([facility] [place] )    [address]  [place] [address]   [phone]            Feb 21, 2019  8:30 AM CST   Level O with HC INF RM 3302   [facility] [place] ([facility] [place] )    [address]  [place] [address]   [phone]            Feb 21, 2019  9:20 AM CST   (Arrive by 9:05 AM)   Return Visit with Yaneli Gray NP   [facility] [place] ([facility] [place] )    [address]  [place] [address]   [phone]              Who to contact     If you have questions or need follow up information about today's clinic visit or your schedule please contact [facility] directly at [phone].  Normal or non-critical lab and imaging results will be communicated to you by MyChart, letter or phone within 4 business days after the clinic has received the results. If you do not hear from us within 7 days,  "please contact the clinic through [employer] or phone. If you have a critical or abnormal lab result, we will notify you by phone as soon as possible.  Submit refill requests through [employer] or call your pharmacy and they will forward the refill request to us. Please allow 3 business days for your refill to be completed.          Additional Information About Your Visit        Talk[employer]art Information     [employer] gives you secure access to your electronic health record. If you see a primary care provider, you can also send messages to your care team and make appointments. If you have questions, please call your primary care clinic.  If you do not have a primary care provider, please call [phone] and they will assist you.        Care EveryWhere ID     This is your Care EveryWhere ID. This could be used by other organizations to access your [place] medical records  [id number]        Your Vitals Were     Temperature Height Pulse Oximetry BMI (Body Mass Index)          97.1  F (36.2  C) (Tympanic) 1.499 m (4' 11.02\") 98% 20.67 kg/m2         Blood Pressure from Last 3 Encounters:   11/20/18 107/68   10/23/18 124/78   10/23/18 124/78    Weight from Last 3 Encounters:   11/20/18 46.4 kg (102 lb 6.4 oz)   10/23/18 46.6 kg (102 lb 12.8 oz)   10/23/18 46.6 kg (102 lb 12.8 oz)              Today, you had the following     No orders found for display       Primary Care Provider Office Phone # Fax #    Zheng De La Rosa [phone]       [facility] [id number]        Equal Access to Services     [facility]: Hadii aad ku hadasho Soomaali, waaxda luqadaha, qaybta kaalmada apolinar capps. So [facility] [phone].    ATENCIÓN: Si habla español, tiene a darden disposición servicios gratuitos de asistencia lingüística. Llame al [phone].    We comply with applicable federal civil rights laws and [place] laws. We do not discriminate on the basis of race, color, " national origin, age, disability, sex, sexual orientation, or gender identity.            Thank you!     Thank you for choosing [facility]  for your care. Our goal is always to provide you with excellent care. Hearing back from our patients is one way we can continue to improve our services. Please take a few minutes to complete the written survey that you may receive in the mail after your visit with us. Thank you!             Your Updated Medication List - Protect others around you: Learn how to safely use, store and throw away your medicines at www.disposemymeds.org.          This list is accurate as of 11/20/18  8:50 AM.  Always use your most recent med list.                   Brand Name Dispense Instructions for use Diagnosis    alendronate 70 MG tablet    FOSAMAX    12 tablet    TAKE 1 TABLET BY MOUTH EVERY WEEK IN THE MORNING; AT LEAST 30 MINUTES BEFORE THE FIRST FOOD, BEVERAGE OR MEDICATION OF THE DAY.    Osteoporosis, unspecified osteoporosis type, unspecified pathological fracture presence       calcium gluconate 500 MG Tabs tablet      Take 1,200 mg by mouth daily    Chronic lymphoid leukemia, 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       lidocaine-prilocaine cream    EMLA    30 g    Apply topically as needed for moderate pain Apply to port site 1 hour before chemotherapy appointment    Chronic lymphocytic leukemia in remission (H)       VITAMIN C PO      Take 500 mg by mouth daily        VITAMIN D3 PO      Take by mouth daily Reported on 5/1/2017    Chronic lymphoid leukemia, 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

## 2018-11-20 NOTE — PROGRESS NOTES
0815 Hand hygiene performed: yes   Mask donned by caregiver: yes   Site prepped with CHG: yes   Labs drawn: no   Dressing applied using aseptic technique: yes   Comment:NA    Patients left port accessed using non-coring, 22 gauge, 3/4 inch needle. Port accessed per facility protocol. Port flushed easily, blood return noted.  No signs and symptoms of infection or infiltration.  Port flushed 10 mLs Normal Saline then Heparin 5mLs of 100 unit/mL.  Needle removed, small dressing applied.  Patient discharged with no complaints.

## 2018-11-27 ENCOUNTER — OFFICE VISIT (OUTPATIENT)
Dept: FAMILY MEDICINE | Facility: OTHER | Age: 78
End: 2018-11-27
Attending: FAMILY MEDICINE
Payer: COMMERCIAL

## 2018-11-27 VITALS
OXYGEN SATURATION: 98 % | TEMPERATURE: 97.8 F | WEIGHT: 99 LBS | BODY MASS INDEX: 19.98 KG/M2 | HEART RATE: 87 BPM | SYSTOLIC BLOOD PRESSURE: 130 MMHG | DIASTOLIC BLOOD PRESSURE: 68 MMHG

## 2018-11-27 DIAGNOSIS — M81.0 OSTEOPOROSIS, UNSPECIFIED OSTEOPOROSIS TYPE, UNSPECIFIED PATHOLOGICAL FRACTURE PRESENCE: ICD-10-CM

## 2018-11-27 DIAGNOSIS — Z12.11 SPECIAL SCREENING FOR MALIGNANT NEOPLASMS, COLON: ICD-10-CM

## 2018-11-27 DIAGNOSIS — Z28.39 IMMUNIZATION DEFICIENCY: Primary | ICD-10-CM

## 2018-11-27 DIAGNOSIS — C91.10 CHRONIC LYMPHOCYTIC LEUKEMIA (H): ICD-10-CM

## 2018-11-27 PROCEDURE — G0463 HOSPITAL OUTPT CLINIC VISIT: HCPCS

## 2018-11-27 PROCEDURE — 99214 OFFICE O/P EST MOD 30 MIN: CPT | Performed by: FAMILY MEDICINE

## 2018-11-27 RX ORDER — ALENDRONATE SODIUM 70 MG/1
TABLET ORAL
Qty: 12 TABLET | Refills: 3 | Status: SHIPPED | OUTPATIENT
Start: 2018-11-27 | End: 2019-11-25

## 2018-11-27 ASSESSMENT — ANXIETY QUESTIONNAIRES
4. TROUBLE RELAXING: NOT AT ALL
1. FEELING NERVOUS, ANXIOUS, OR ON EDGE: SEVERAL DAYS
3. WORRYING TOO MUCH ABOUT DIFFERENT THINGS: NOT AT ALL
7. FEELING AFRAID AS IF SOMETHING AWFUL MIGHT HAPPEN: NOT AT ALL
6. BECOMING EASILY ANNOYED OR IRRITABLE: NOT AT ALL
5. BEING SO RESTLESS THAT IT IS HARD TO SIT STILL: NOT AT ALL
GAD7 TOTAL SCORE: 1
2. NOT BEING ABLE TO STOP OR CONTROL WORRYING: NOT AT ALL

## 2018-11-27 ASSESSMENT — PAIN SCALES - GENERAL: PAINLEVEL: NO PAIN (0)

## 2018-11-27 ASSESSMENT — PATIENT HEALTH QUESTIONNAIRE - PHQ9: SUM OF ALL RESPONSES TO PHQ QUESTIONS 1-9: 0

## 2018-11-27 NOTE — NURSING NOTE
"Chief Complaint   Patient presents with     Lipids     Port Check       Initial /68 (BP Location: Right arm, Patient Position: Chair, Cuff Size: Adult Regular)  Pulse 87  Temp 97.8  F (36.6  C) (Tympanic)  Wt 99 lb (44.9 kg)  SpO2 98%  BMI 19.98 kg/m2 Estimated body mass index is 19.98 kg/(m^2) as calculated from the following:    Height as of 11/20/18: 4' 11.02\" (1.499 m).    Weight as of this encounter: 99 lb (44.9 kg).  Medication Reconciliation: complete    Ciarra Bean LPN    "

## 2018-11-27 NOTE — PROGRESS NOTES
SUBJECTIVE:   Jennifer Barajas is a 78 year old female who presents to clinic today for the following health issues:    Hyperlipidemia Follow-Up      Rate your low fat/cholesterol diet?: not monitoring fat    Taking statin?  No    Other lipid medications/supplements?: none      Amount of exercise or physical activity: None    Problems taking medications regularly: No    Medication side effects: none    Diet: regular (no restrictions)      Port Check       Duration: 1 week ago when to get port flushed and turned black and blue     Description (location/character/radiation): Concerns about black and blueness     Intensity:  Not painful     Accompanying signs and symptoms: Bruised     History (similar episodes/previous evaluation): None    Precipitating or alleviating factors: None    Therapies tried and outcome: None       Would like refill on medications - orders pending     Would like to be updated on immunizations         Problem list and histories reviewed & adjusted, as indicated.  Additional history: as documented    Labs reviewed in EPIC    Reviewed and updated as needed this visit by clinical staff  Tobacco  Allergies  Meds  Med Hx  Surg Hx  Fam Hx  Soc Hx      Reviewed and updated as needed this visit by Provider         ROS:  CONSTITUTIONAL: NEGATIVE for fever, chills, change in weight  ENT/MOUTH: NEGATIVE for ear, mouth and throat problems  RESP: NEGATIVE for significant cough or SOB  CV: NEGATIVE for chest pain, palpitations or peripheral edema  ROS otherwise negative    OBJECTIVE:                                                    /68 (BP Location: Right arm, Patient Position: Chair, Cuff Size: Adult Regular)  Pulse 87  Temp 97.8  F (36.6  C) (Tympanic)  Wt 99 lb (44.9 kg)  SpO2 98%  BMI 19.98 kg/m2  Body mass index is 19.98 kg/(m^2).   GENERAL: healthy, alert, well nourished, well hydrated, no distress  HENT: ear canals- normal; TMs- normal; Nose- normal; Mouth- no ulcers, no  lesions  NECK: no tenderness, no adenopathy, no asymmetry, no masses, no stiffness; thyroid- normal to palpation  Breast exam - unremarkable - both nipples inverted chronicly   RESP: lungs clear to auscultation - no rales, no rhonchi, no wheezes  CV: regular rates and rhythm, normal S1 S2, no S3 or S4 and no murmur, no click or rub -  ABDOMEN: soft, no tenderness, no  hepatosplenomegaly, no masses, normal bowel sounds  MS: extremities- no gross deformities noted, no edema  PSYCH: Alert and oriented times 3; speech- coherent , normal rate and volume; able to articulate logical thoughts, able to abstract reason, no tangential thoughts, no hallucinations or delusions, affect- normal for her - has baseline anxiety   Skin - mild bruising of port site        ASSESSMENT/PLAN:                                                    (Z28.3) Immunization deficiency  (primary encounter diagnosis)  Comment: Rx given - UTD on other shots   Plan: zoster vaccine recombinant adjuvanted         (SHINGRIX) injection            (M81.0) Osteoporosis, unspecified osteoporosis type, unspecified pathological fracture presence  Comment: Will refill and DEXA needed in a year   Plan: alendronate (FOSAMAX) 70 MG tablet        Continue current medications and behavioral changes.     (C91.90) Chronic lymphocytic leukemia (H)  Comment: stable - sees Oncology   Plan: Continue with Oncology     (Z12.11) Special screening for malignant neoplasms, colon  Comment: long discussion with age/ false positive of IFOB/ risk of colonoscopy  Plan: Will for go any further screening.  She agrees.     Mammogram in late winter needed.             Zheng De La Rosa MD  [facility] [place]

## 2018-11-27 NOTE — MR AVS SNAPSHOT
After Visit Summary   11/27/2018    Jennifer Barajas    MRN: [id number]           Patient Information     Date Of Birth          1940        Visit Information        Provider Department      11/27/2018 3:30 PM Zheng De La Rosa MD [facility]        Today's Diagnoses     Immunization deficiency    -  1    Osteoporosis, unspecified osteoporosis type, unspecified pathological fracture presence        Chronic lymphocytic leukemia (H)        Special screening for malignant neoplasms, colon           Follow-ups after your visit        Your next 10 appointments already scheduled     Dec 20, 2018  8:30 AM CST   Level O with HC INF RM 3306   [facility] [place] ([facility] [place] )    [address]  [place] [address]   [phone]            Jan 21, 2019  8:30 AM CST   Level O with HC INF RM [id number]   [facility] [place] ([facility] [place] )    [address]  [place] [address]   [phone]            Feb 21, 2019  8:30 AM CST   Level O with HC INF RM 3302   [facility] [place] ([facility] [place] )    [address]  [place] [address]   [phone]            Feb 21, 2019  9:20 AM CST   (Arrive by 9:05 AM)   Return Visit with Yaneli Gray NP   [facility] [place] ([facility] [place] )    [address]  [place] [address]   [phone]              Who to contact     If you have questions or need follow up information about today's clinic visit or your schedule please contact [facility] directly at [phone].  Normal or non-critical lab and imaging results will be communicated to you by MyChart, letter or phone within 4 business days after the clinic has received the results. If you do not hear from us within 7 days, please contact the clinic through MyChart or phone. If you have a critical or abnormal lab result, we will notify  you by phone as soon as possible.  Submit refill requests through [employer] or call your pharmacy and they will forward the refill request to us. Please allow 3 business days for your refill to be completed.          Additional Information About Your Visit        [facility] Information     [employer] gives you secure access to your electronic health record. If you see a primary care provider, you can also send messages to your care team and make appointments. If you have questions, please call your primary care clinic.  If you do not have a primary care provider, please call [phone] and they will assist you.        Care EveryWhere ID     This is your Care EveryWhere ID. This could be used by other organizations to access your [facility] medical records  [id number]        Your Vitals Were     Pulse Temperature Pulse Oximetry BMI (Body Mass Index)          87 97.8  F (36.6  C) (Tympanic) 98% 19.98 kg/m2         Blood Pressure from Last 3 Encounters:   11/27/18 130/68   11/20/18 107/68   10/23/18 124/78    Weight from Last 3 Encounters:   11/27/18 99 lb (44.9 kg)   11/20/18 102 lb 6.4 oz (46.4 kg)   10/23/18 102 lb 12.8 oz (46.6 kg)              Today, you had the following     No orders found for display         Today's Medication Changes          These changes are accurate as of 11/27/18  4:45 PM.  If you have any questions, ask your nurse or doctor.               Start taking these medicines.        Dose/Directions    zoster vaccine recombinant adjuvanted injection   Commonly known as:  SHINGRIX   Used for:  Immunization deficiency   Started by:  Zheng De La Rosa MD        Dose:  1 each   Inject 0.5 mLs into the muscle once for 1 dose   Quantity:  0.5 mL   Refills:  1            Where to get your medicines      These medications were sent to [id number] - [place] - [address]  [address] [address]     Phone:  [phone]     alendronate 70 MG tablet         Some of these will need  a paper prescription and others can be bought over the counter.  Ask your nurse if you have questions.     Bring a paper prescription for each of these medications     zoster vaccine recombinant adjuvanted injection                Primary Care Provider Office Phone # Fax #    Zheng De La Rosa [phone] [address] [facility] [address]        Equal Access to Services     [facility] MAKAYLA : Hadii aad ku hadasho Soomaali, waaxda luqadaha, qaybta kaalmada adeegyada, apolinar pereira haybraulion layne khcandicesandra mukherjee . So [facility] [phone].    ATENCIÓN: Si habla español, tiene a darden disposición servicios gratuitos de asistencia lingüística. Llame al [phone].    We comply with applicable federal civil rights laws and [place] laws. We do not discriminate on the basis of race, color, national origin, age, disability, sex, sexual orientation, or gender identity.            Thank you!     Thank you for choosing [facility]  for your care. Our goal is always to provide you with excellent care. Hearing back from our patients is one way we can continue to improve our services. Please take a few minutes to complete the written survey that you may receive in the mail after your visit with us. Thank you!             Your Updated Medication List - Protect others around you: Learn how to safely use, store and throw away your medicines at www.disposemymeds.org.          This list is accurate as of 11/27/18  4:45 PM.  Always use your most recent med list.                   Brand Name Dispense Instructions for use Diagnosis    alendronate 70 MG tablet    FOSAMAX    12 tablet    TAKE 1 TABLET BY MOUTH EVERY WEEK IN THE MORNING; AT LEAST 30 MINUTES BEFORE THE FIRST FOOD, BEVERAGE OR MEDICATION OF THE DAY.    Osteoporosis, unspecified osteoporosis type, unspecified pathological fracture presence       calcium gluconate 500 MG tablet      Take 1,200 mg by mouth daily    Chronic lymphoid leukemia,  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       lidocaine-prilocaine 2.5-2.5 % external cream    EMLA    30 g    Apply topically as needed for moderate pain Apply to port site 1 hour before chemotherapy appointment    Chronic lymphocytic leukemia in remission (H)       VITAMIN C PO      Take 500 mg by mouth daily        VITAMIN D3 PO      Take by mouth daily Reported on 5/1/2017    Chronic lymphoid leukemia, 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       zoster vaccine recombinant adjuvanted injection    SHINGRIX    0.5 mL    Inject 0.5 mLs into the muscle once for 1 dose    Immunization deficiency

## 2018-11-28 ASSESSMENT — ANXIETY QUESTIONNAIRES: GAD7 TOTAL SCORE: 1

## 2018-12-20 ENCOUNTER — INFUSION THERAPY VISIT (OUTPATIENT)
Dept: INFUSION THERAPY | Facility: OTHER | Age: 78
End: 2018-12-20
Attending: NURSE PRACTITIONER
Payer: MEDICARE

## 2018-12-20 VITALS
TEMPERATURE: 97.2 F | WEIGHT: 101.5 LBS | OXYGEN SATURATION: 95 % | SYSTOLIC BLOOD PRESSURE: 125 MMHG | RESPIRATION RATE: 18 BRPM | BODY MASS INDEX: 20.46 KG/M2 | DIASTOLIC BLOOD PRESSURE: 73 MMHG | HEART RATE: 77 BPM | HEIGHT: 59 IN

## 2018-12-20 DIAGNOSIS — C91.10 CHRONIC LYMPHOCYTIC LEUKEMIA (H): Primary | ICD-10-CM

## 2018-12-20 PROCEDURE — 25000128 H RX IP 250 OP 636: Performed by: INTERNAL MEDICINE

## 2018-12-20 PROCEDURE — 96523 IRRIG DRUG DELIVERY DEVICE: CPT

## 2018-12-20 RX ORDER — HEPARIN SODIUM (PORCINE) LOCK FLUSH IV SOLN 100 UNIT/ML 100 UNIT/ML
500 SOLUTION INTRAVENOUS EVERY 8 HOURS
Status: DISCONTINUED | OUTPATIENT
Start: 2018-12-20 | End: 2018-12-20 | Stop reason: HOSPADM

## 2018-12-20 RX ORDER — HEPARIN SODIUM (PORCINE) LOCK FLUSH IV SOLN 100 UNIT/ML 100 UNIT/ML
500 SOLUTION INTRAVENOUS EVERY 8 HOURS
Status: CANCELLED
Start: 2018-12-20

## 2018-12-20 RX ADMIN — HEPARIN 500 UNITS: 100 SYRINGE at 08:39

## 2018-12-20 ASSESSMENT — MIFFLIN-ST. JEOR: SCORE: 856.28

## 2019-01-21 ENCOUNTER — INFUSION THERAPY VISIT (OUTPATIENT)
Dept: INFUSION THERAPY | Facility: OTHER | Age: 79
End: 2019-01-21
Attending: NURSE PRACTITIONER
Payer: MEDICARE

## 2019-01-21 DIAGNOSIS — C91.10 CHRONIC LYMPHOCYTIC LEUKEMIA (H): Primary | ICD-10-CM

## 2019-01-21 PROCEDURE — 96523 IRRIG DRUG DELIVERY DEVICE: CPT

## 2019-01-21 PROCEDURE — 25000128 H RX IP 250 OP 636: Performed by: INTERNAL MEDICINE

## 2019-01-21 RX ORDER — HEPARIN SODIUM (PORCINE) LOCK FLUSH IV SOLN 100 UNIT/ML 100 UNIT/ML
500 SOLUTION INTRAVENOUS EVERY 8 HOURS
Status: CANCELLED
Start: 2019-01-21

## 2019-01-21 RX ORDER — HEPARIN SODIUM (PORCINE) LOCK FLUSH IV SOLN 100 UNIT/ML 100 UNIT/ML
500 SOLUTION INTRAVENOUS EVERY 8 HOURS
Status: DISCONTINUED | OUTPATIENT
Start: 2019-01-21 | End: 2019-01-21 | Stop reason: HOSPADM

## 2019-01-21 RX ADMIN — HEPARIN 500 UNITS: 100 SYRINGE at 08:38

## 2019-02-20 ENCOUNTER — ONCOLOGY VISIT (OUTPATIENT)
Dept: ONCOLOGY | Facility: OTHER | Age: 79
End: 2019-02-20
Attending: NURSE PRACTITIONER
Payer: MEDICARE

## 2019-02-20 ENCOUNTER — INFUSION THERAPY VISIT (OUTPATIENT)
Dept: INFUSION THERAPY | Facility: OTHER | Age: 79
End: 2019-02-20
Attending: NURSE PRACTITIONER
Payer: MEDICARE

## 2019-02-20 VITALS
WEIGHT: 101.4 LBS | TEMPERATURE: 98.1 F | SYSTOLIC BLOOD PRESSURE: 120 MMHG | HEART RATE: 76 BPM | HEIGHT: 59 IN | RESPIRATION RATE: 18 BRPM | BODY MASS INDEX: 20.44 KG/M2 | DIASTOLIC BLOOD PRESSURE: 70 MMHG | OXYGEN SATURATION: 99 %

## 2019-02-20 DIAGNOSIS — C91.10 CHRONIC LYMPHOCYTIC LEUKEMIA (H): Primary | ICD-10-CM

## 2019-02-20 LAB
ALBUMIN SERPL-MCNC: 4.1 G/DL (ref 3.4–5)
ALP SERPL-CCNC: 76 U/L (ref 40–150)
ALT SERPL W P-5'-P-CCNC: 22 U/L (ref 0–50)
ANION GAP SERPL CALCULATED.3IONS-SCNC: 8 MMOL/L (ref 3–14)
AST SERPL W P-5'-P-CCNC: 25 U/L (ref 0–45)
BASOPHILS # BLD AUTO: 0 10E9/L (ref 0–0.2)
BASOPHILS NFR BLD AUTO: 0.5 %
BILIRUB SERPL-MCNC: 0.6 MG/DL (ref 0.2–1.3)
BUN SERPL-MCNC: 16 MG/DL (ref 7–30)
CALCIUM SERPL-MCNC: 8.9 MG/DL (ref 8.5–10.1)
CHLORIDE SERPL-SCNC: 105 MMOL/L (ref 94–109)
CO2 SERPL-SCNC: 26 MMOL/L (ref 20–32)
CREAT SERPL-MCNC: 0.72 MG/DL (ref 0.52–1.04)
DIFFERENTIAL METHOD BLD: NORMAL
EOSINOPHIL # BLD AUTO: 0.2 10E9/L (ref 0–0.7)
EOSINOPHIL NFR BLD AUTO: 3.1 %
ERYTHROCYTE [DISTWIDTH] IN BLOOD BY AUTOMATED COUNT: 13.6 % (ref 10–15)
GFR SERPL CREATININE-BSD FRML MDRD: 82 ML/MIN/{1.73_M2}
GLUCOSE SERPL-MCNC: 94 MG/DL (ref 70–99)
HCT VFR BLD AUTO: 41.7 % (ref 35–47)
HGB BLD-MCNC: 14.2 G/DL (ref 11.7–15.7)
IMM GRANULOCYTES # BLD: 0 10E9/L (ref 0–0.4)
IMM GRANULOCYTES NFR BLD: 0.2 %
LDH SERPL L TO P-CCNC: 213 U/L (ref 81–234)
LYMPHOCYTES # BLD AUTO: 1.1 10E9/L (ref 0.8–5.3)
LYMPHOCYTES NFR BLD AUTO: 17.1 %
MCH RBC QN AUTO: 29.8 PG (ref 26.5–33)
MCHC RBC AUTO-ENTMCNC: 34.1 G/DL (ref 31.5–36.5)
MCV RBC AUTO: 88 FL (ref 78–100)
MONOCYTES # BLD AUTO: 0.5 10E9/L (ref 0–1.3)
MONOCYTES NFR BLD AUTO: 8.7 %
NEUTROPHILS # BLD AUTO: 4.4 10E9/L (ref 1.6–8.3)
NEUTROPHILS NFR BLD AUTO: 70.4 %
NRBC # BLD AUTO: 0 10*3/UL
NRBC BLD AUTO-RTO: 0 /100
PLATELET # BLD AUTO: 257 10E9/L (ref 150–450)
POTASSIUM SERPL-SCNC: 3.9 MMOL/L (ref 3.4–5.3)
PROT SERPL-MCNC: 7.2 G/DL (ref 6.8–8.8)
RBC # BLD AUTO: 4.76 10E12/L (ref 3.8–5.2)
SODIUM SERPL-SCNC: 139 MMOL/L (ref 133–144)
WBC # BLD AUTO: 6.2 10E9/L (ref 4–11)

## 2019-02-20 PROCEDURE — 80053 COMPREHEN METABOLIC PANEL: CPT | Mod: ZL | Performed by: NURSE PRACTITIONER

## 2019-02-20 PROCEDURE — 36415 COLL VENOUS BLD VENIPUNCTURE: CPT | Mod: ZL | Performed by: NURSE PRACTITIONER

## 2019-02-20 PROCEDURE — 83615 LACTATE (LD) (LDH) ENZYME: CPT | Mod: ZL | Performed by: NURSE PRACTITIONER

## 2019-02-20 PROCEDURE — 25000128 H RX IP 250 OP 636: Performed by: INTERNAL MEDICINE

## 2019-02-20 PROCEDURE — G0463 HOSPITAL OUTPT CLINIC VISIT: HCPCS

## 2019-02-20 PROCEDURE — 96523 IRRIG DRUG DELIVERY DEVICE: CPT

## 2019-02-20 PROCEDURE — 99213 OFFICE O/P EST LOW 20 MIN: CPT | Performed by: NURSE PRACTITIONER

## 2019-02-20 PROCEDURE — 85025 COMPLETE CBC W/AUTO DIFF WBC: CPT | Mod: ZL | Performed by: NURSE PRACTITIONER

## 2019-02-20 RX ORDER — HEPARIN SODIUM (PORCINE) LOCK FLUSH IV SOLN 100 UNIT/ML 100 UNIT/ML
500 SOLUTION INTRAVENOUS EVERY 8 HOURS
Status: CANCELLED
Start: 2019-02-20

## 2019-02-20 RX ORDER — HEPARIN SODIUM (PORCINE) LOCK FLUSH IV SOLN 100 UNIT/ML 100 UNIT/ML
500 SOLUTION INTRAVENOUS EVERY 8 HOURS
Status: DISCONTINUED | OUTPATIENT
Start: 2019-02-20 | End: 2019-02-20 | Stop reason: HOSPADM

## 2019-02-20 RX ADMIN — HEPARIN 500 UNITS: 100 SYRINGE at 08:40

## 2019-02-20 ASSESSMENT — MIFFLIN-ST. JEOR: SCORE: 855.58

## 2019-02-20 ASSESSMENT — PAIN SCALES - GENERAL: PAINLEVEL: NO PAIN (0)

## 2019-02-20 ASSESSMENT — PATIENT HEALTH QUESTIONNAIRE - PHQ9: SUM OF ALL RESPONSES TO PHQ QUESTIONS 1-9: 0

## 2019-02-20 NOTE — NURSING NOTE
"Chief Complaint   Patient presents with     RECHECK     Follow up Chronic lymphocytic leukemia        Initial /70   Pulse 76   Temp 98.1  F (36.7  C) (Tympanic)   Resp 18   Ht 1.499 m (4' 11\")   Wt 46 kg (101 lb 6.4 oz)   SpO2 99%   BMI 20.48 kg/m   Estimated body mass index is 20.48 kg/m  as calculated from the following:    Height as of this encounter: 1.499 m (4' 11\").    Weight as of this encounter: 46 kg (101 lb 6.4 oz).  Medication Reconciliation: complete.  Immunizations reviewed, advanced directives on file, pain = 0, PHQ9 = 0.    Ana Tate LPN    "

## 2019-02-20 NOTE — PROGRESS NOTES
Patients left sided port accessed using non-coring, 19 gauge, 3/4 inch needle. Port accessed per facility protocol.  Hand hygiene performed: yes   Mask donned by caregiver: yes Site prepped with CHG: yes Labs drawn: yes Dressing applied using aseptic technique: yes Port flushed easily, without resistance. Flushed with 10 cc's normal saline.   Immediate blood return noted. 10 cc blood discarded.  2 vials blood draw and sent to lab for results.  Port flushed with 20 cc 0.9% normal saline and 5 cc heparinized saline.  Needle removed intact, sterile dressing applied.  Slight pressure applied for 30 seconds.   Patient tolerated port flush well, denies pain nor discomfort at this time. Patient instructed to leave dressing intact for a minimum of one hour, and to call with questions or concerns.  Patient states understanding and is in agreement with this plan.  Patient discharged ambulatory.

## 2019-02-20 NOTE — PROGRESS NOTES
Oncology Follow-up Visit:  February 20, 2019    Reason for Visit:  Patient presents with:  RECHECK: Follow up Chronic lymphocytic leukemia      Nursing Note and documentation reviewed: yes    HPI:   This is a 78-year-old female patient who presents to the oncology/hematology clinic today in follow up of CLL diagnosed December 2011. She completed 6 cycles of Bendamustine July 2, 2015.   She is being followed with surveillance.    She presents to the clinic today with no complaints.  Denies any issues with weight loss, decreased appetite, fever or night sweats.  She once again admits to increased anxiety just prior to these appointments.    Oncologic History: Jennifer was diagnosed with CLL in December 2011 after her primary care provider noted an elevated WBC on her annual exam. A peripheral blood flow for cytometry revealed she was CD5 positive, CD10 negative, consistent with CLL/SLL. Staging studies were completed and CT scan showed no evidence of lymphadenopathy or splenomegaly. She was asymptomatic with essentially stable lymphocyte count and followed with surveillance.      With patient's follow up in October 2014, hemoglobin was noted to be 10.7 with Hematocrit 30.0 and platelets 137,000. She was subsequently seen again 2 months later with further drop in hemoglobin to 8.6 with hematocrit 26.1 and platelets 140,000 with a white blood cell count of 13.5 and ANC 1.2, absolute lymphocytes were 12.1. A PET scan was completed on 12/11/2014 which did show mildly enlarged axillary lymph nodes with very mild abnormal hypermetabolism of SUV of 2 or less. Bone marrow aspiration and biopsy was completed which revealed infiltration of CLL with 90% of cells confirmed to be CLL via flow morphologically. Patient was staged at least stage III CLL based on her anemia. The plan was to proceed with chemotherapy consisting of Rituxan and Fludara. Patient did experience hypersensitivity reaction with cycle 2 Rituxan therapy  experiencing hypotension. This was discontinued and chemotherapy regime was changed to Bendamustine day 1 and day 2 every 28 days. She was started on 2/12/2015 and completed therapy in July 2015.      Current Chemo Regime/TX:  n/a  Current Cycle:  n/a  # of completed cycles:  n/a      Previous treatment: Rituxan/Fludara completing one full cycle and experienced hypersensitivity with Rituxan with second cycle;  Bendamustine 50 mg per metered squared days 1 and 2 every 28 days x6 cycles completed 7/2/15      Past Medical History:   Diagnosis Date     Chronic lymphoid leukemia, 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 2/28/2012     Diverticulosis of colon (without mention of hemorrhage) 11/29/2010     Family history of ischemic heart disease 11/29/2010     Family history of malignant neoplasm of gastrointestinal tract 10/14/2002     Non-toxic multinodular goiter 11/8/2016     Osteoporosis, unspecified 11/29/2010     Other and unspecified hyperlipidemia 11/28/2011     Viral warts, unspecified 11/28/2011       Past Surgical History:   Procedure Laterality Date     ------------OTHER-------------      elbow repair     COLONOSCOPY  01/12/2010    repeat in 2015     COLONOSCOPY       COLONOSCOPY       INSERT PORT VASCULAR ACCESS N/A 1/5/2015    Procedure: INSERT PORT VASCULAR ACCESS;  Surgeon: Linda Oliver MD;  Location: [facility]     ORTHOPEDIC SURGERY  1993    Left elbow repair       Family History   Problem Relation Age of Onset     Cancer Mother 97        colon cancer - cause of death     Colon Cancer Mother      Cancer Father      Colon Cancer Father      Diabetes Brother      Obesity Brother      Breast Cancer Daughter      Hyperlipidemia Daughter      Thyroid Disease Daughter      Hypertension Son      Hyperlipidemia Son      Thyroid Disease Cousin      Hyperlipidemia Son      Coronary Artery Disease No family hx of      Cerebrovascular Disease No family hx of      Prostate Cancer No family hx of       Other Cancer No family hx of      Anesthesia Reaction No family hx of      Asthma No family hx of      Genetic Disorder No family hx of        Social History     Socioeconomic History     Marital status:      Spouse name: Not on file     Number of children: Not on file     Years of education: Not on file     Highest education level: Not on file   Occupational History     Occupation: [employer]   Social Needs     Financial resource strain: Not on file     Food insecurity:     Worry: Not on file     Inability: Not on file     Transportation needs:     Medical: Not on file     Non-medical: Not on file   Tobacco Use     Smoking status: Never Smoker     Smokeless tobacco: Never Used   Substance and Sexual Activity     Alcohol use: No     Drug use: No     Sexual activity: No     Comment: devorced   Lifestyle     Physical activity:     Days per week: Not on file     Minutes per session: Not on file     Stress: Not on file   Relationships     Social connections:     Talks on phone: Not on file     Gets together: Not on file     Attends Buddhist service: Not on file     Active member of club or organization: Not on file     Attends meetings of clubs or organizations: Not on file     Relationship status: Not on file     Intimate partner violence:     Fear of current or ex partner: Not on file     Emotionally abused: Not on file     Physically abused: Not on file     Forced sexual activity: Not on file   Other Topics Concern     Parent/sibling w/ CABG, MI or angioplasty before 65F 55M? No      Service No     Blood Transfusions Yes     Comment: permits if needed     Caffeine Concern Yes     Comment: 1 cup     Occupational Exposure No     Hobby Hazards No     Sleep Concern No     Stress Concern No     Weight Concern No     Special Diet No     Back Care No     Exercise No     Bike Helmet Not Asked     Seat Belt Yes     Self-Exams Not Asked   Social History Narrative     Not on file       Current Outpatient  "Medications   Medication     alendronate (FOSAMAX) 70 MG tablet     Ascorbic Acid (VITAMIN C PO)     calcium gluconate 500 MG TABS     Cholecalciferol (VITAMIN D3 PO)     lidocaine-prilocaine (EMLA) cream     No current facility-administered medications for this visit.      Facility-Administered Medications Ordered in Other Visits   Medication     heparin 100 UNIT/ML injection 500 Units     sodium chloride (PF) 0.9% PF flush 10 mL        Allergies   Allergen Reactions     Sulfa Drugs Rash     Rituxan [Rituximab]      Simvastatin Other (See Comments)     Takes pravastatin at home  Zocor - myalgia       Review Of Systems:  Constitutional:    denies fever, weight changes, chills, and night sweats.  Eyes:    denies blurred or double vision  Ears/Nose/Throat:   denies ear pain, difficulty swallowing; some runny nose  Respiratory:   denies shortness of breath, cough  Skin:   denies rash, lesions  Cardiovascular:   denies chest pain, palpitations, edema  Gastrointestinal:   denies abdominal pain, bloating, nausea, early satiety; no changes in her bowel movements-no need for further colon screening per her PCP  Genitourinary:   denies difficulty with urination, blood in urine  Musculoskeletal:    denies new muscle pain, bone pain  Neurologic:   denies lightheadedness, headaches, numbness or tingling  Psychiatric:   denies anxiety, depression  Hematologic/Lymphatic/Immunologic:   denies easy bruising, easy bleeding, lumps or bumps noted  Endocrine:   Denies increased thirst      Physical Exam:  /70   Pulse 76   Temp 98.1  F (36.7  C) (Tympanic)   Resp 18   Ht 1.499 m (4' 11\")   Wt 46 kg (101 lb 6.4 oz)   SpO2 99%   BMI 20.48 kg/m      GENERAL APPEARANCE: Healthy, alert and in no acute distress.  HEENT: Normocephalic, Sclerae anicteric. Oropharynx without ulcers, lesions, or thrush.  NECK:   No asymmetry or masses, no thyromegaly.  LYMPHATICS: No palpable cervical, supraclavicular, axillary, or inguinal nodes "   RESP: Lungs clear to auscultation bilaterally, respirations regular and easy  CARDIOVASCULAR: Regular rate and rhythm. Normal S1, S2  ABDOMEN: Soft, nontender. Bowel sounds auscultated all 4 quadrants. No palpable organomegaly or masses.  MUSCULOSKELETAL: Extremities without gross deformities noted. No edema of bilateral lower extremities.  NEURO: Alert and oriented x 3.  Gait steady.  PSYCHIATRIC: Mentation and affect appear normal.  Mood appropriate.    Laboratory:  Results for orders placed or performed in visit on 02/20/19   Lactate Dehydrogenase   Result Value Ref Range    Lactate Dehydrogenase 213 81 - 234 U/L   Comprehensive metabolic panel   Result Value Ref Range    Sodium 139 133 - 144 mmol/L    Potassium 3.9 3.4 - 5.3 mmol/L    Chloride 105 94 - 109 mmol/L    Carbon Dioxide 26 20 - 32 mmol/L    Anion Gap 8 3 - 14 mmol/L    Glucose 94 70 - 99 mg/dL    Urea Nitrogen 16 7 - 30 mg/dL    Creatinine 0.72 0.52 - 1.04 mg/dL    GFR Estimate 82 >60 mL/min/[1.73_m2]    GFR Estimate If Black >90 >60 mL/min/[1.73_m2]    Calcium 8.9 8.5 - 10.1 mg/dL    Bilirubin Total 0.6 0.2 - 1.3 mg/dL    Albumin 4.1 3.4 - 5.0 g/dL    Protein Total 7.2 6.8 - 8.8 g/dL    Alkaline Phosphatase 76 40 - 150 U/L    ALT 22 0 - 50 U/L    AST 25 0 - 45 U/L   CBC with platelets differential   Result Value Ref Range    WBC 6.2 4.0 - 11.0 10e9/L    RBC Count 4.76 3.8 - 5.2 10e12/L    Hemoglobin 14.2 11.7 - 15.7 g/dL    Hematocrit 41.7 35.0 - 47.0 %    MCV 88 78 - 100 fl    MCH 29.8 26.5 - 33.0 pg    MCHC 34.1 31.5 - 36.5 g/dL    RDW 13.6 10.0 - 15.0 %    Platelet Count 257 150 - 450 10e9/L    Diff Method Automated Method     % Neutrophils 70.4 %    % Lymphocytes 17.1 %    % Monocytes 8.7 %    % Eosinophils 3.1 %    % Basophils 0.5 %    % Immature Granulocytes 0.2 %    Nucleated RBCs 0 0 /100    Absolute Neutrophil 4.4 1.6 - 8.3 10e9/L    Absolute Lymphocytes 1.1 0.8 - 5.3 10e9/L    Absolute Monocytes 0.5 0.0 - 1.3 10e9/L    Absolute  Eosinophils 0.2 0.0 - 0.7 10e9/L    Absolute Basophils 0.0 0.0 - 0.2 10e9/L    Abs Immature Granulocytes 0.0 0 - 0.4 10e9/L    Absolute Nucleated RBC 0.0        Imaging Studies: None completed for today's visit      ASSESSMENT/PLAN:    #1 CLL/SLL: Diagnosed with CLL/SLL in December 2011. Stage III. She received 6 cycles of bendamustine completing this July 2015 and is now followed with surveillance.  She is feeling good.  Labs are good with normal LDH.  She will follow up in 4 months with a CBC, CMP and LDH.      I encouraged patient to call with any questions or concerns.    Yaneli CHO, FNP-BC, AOCNP

## 2019-02-20 NOTE — PATIENT INSTRUCTIONS
We would like to see you back in 4 months. Please come 30 minutes prior for lab work through your port.  We will set you up for monthly port flushes.    If you have any questions please call [phone]    Other instructions:  none

## 2019-03-20 ENCOUNTER — INFUSION THERAPY VISIT (OUTPATIENT)
Dept: INFUSION THERAPY | Facility: OTHER | Age: 79
End: 2019-03-20
Attending: NURSE PRACTITIONER
Payer: MEDICARE

## 2019-03-20 DIAGNOSIS — C91.10 CHRONIC LYMPHOCYTIC LEUKEMIA (H): Primary | ICD-10-CM

## 2019-03-20 PROCEDURE — 96523 IRRIG DRUG DELIVERY DEVICE: CPT

## 2019-03-20 PROCEDURE — 25000128 H RX IP 250 OP 636: Performed by: NURSE PRACTITIONER

## 2019-03-20 RX ORDER — HEPARIN SODIUM (PORCINE) LOCK FLUSH IV SOLN 100 UNIT/ML 100 UNIT/ML
500 SOLUTION INTRAVENOUS EVERY 8 HOURS
Status: DISCONTINUED | OUTPATIENT
Start: 2019-03-20 | End: 2019-03-20 | Stop reason: HOSPADM

## 2019-03-20 RX ORDER — HEPARIN SODIUM (PORCINE) LOCK FLUSH IV SOLN 100 UNIT/ML 100 UNIT/ML
500 SOLUTION INTRAVENOUS EVERY 8 HOURS
Status: CANCELLED
Start: 2019-03-20

## 2019-03-20 RX ADMIN — Medication 500 UNITS: at 08:31

## 2019-03-20 NOTE — PROGRESS NOTES
Hand hygiene performed: yes   Mask donned by caregiver: yes   Site prepped with CHG: yes   Labs drawn: no   Dressing applied using aseptic technique: yes   Patients left sided port accessed using non-coring, 22 gauge, 3/4 inch needle. Port accessed per facility protocol. Port flushed easily, blood return noted.  No signs and symptoms of infection or infiltration.  Port flushed 10 mLs Normal Saline then Heparin 5mLs of 100 unit/mL.  Needle removed, small dressing applied.  Patient discharged with no complaints.

## 2019-04-18 ENCOUNTER — INFUSION THERAPY VISIT (OUTPATIENT)
Dept: INFUSION THERAPY | Facility: OTHER | Age: 79
End: 2019-04-18
Attending: NURSE PRACTITIONER
Payer: MEDICARE

## 2019-04-18 VITALS
SYSTOLIC BLOOD PRESSURE: 135 MMHG | TEMPERATURE: 98.3 F | HEART RATE: 85 BPM | BODY MASS INDEX: 20.5 KG/M2 | RESPIRATION RATE: 18 BRPM | DIASTOLIC BLOOD PRESSURE: 75 MMHG | OXYGEN SATURATION: 97 % | HEIGHT: 59 IN | WEIGHT: 101.7 LBS

## 2019-04-18 DIAGNOSIS — C91.10 CHRONIC LYMPHOCYTIC LEUKEMIA (H): Primary | ICD-10-CM

## 2019-04-18 PROCEDURE — 96523 IRRIG DRUG DELIVERY DEVICE: CPT

## 2019-04-18 PROCEDURE — 25000128 H RX IP 250 OP 636: Performed by: NURSE PRACTITIONER

## 2019-04-18 RX ORDER — HEPARIN SODIUM (PORCINE) LOCK FLUSH IV SOLN 100 UNIT/ML 100 UNIT/ML
500 SOLUTION INTRAVENOUS EVERY 8 HOURS
Status: CANCELLED
Start: 2019-04-18

## 2019-04-18 RX ORDER — HEPARIN SODIUM (PORCINE) LOCK FLUSH IV SOLN 100 UNIT/ML 100 UNIT/ML
500 SOLUTION INTRAVENOUS EVERY 8 HOURS
Status: DISCONTINUED | OUTPATIENT
Start: 2019-04-18 | End: 2019-04-18 | Stop reason: HOSPADM

## 2019-04-18 RX ADMIN — Medication 500 UNITS: at 08:35

## 2019-04-18 ASSESSMENT — PAIN SCALES - GENERAL: PAINLEVEL: NO PAIN (0)

## 2019-04-18 ASSESSMENT — MIFFLIN-ST. JEOR: SCORE: 856.94

## 2019-04-18 NOTE — PROGRESS NOTES
Patient is a 78 year old female here today for port flush per order of Dr. Bach.  Skin is warm, clean, dry, and intact without redness, swelling, nor other signs of infection noted.  Port accessed via sterile technique per facility protocol with a 22 gauge, 3/4 inch non coring 90 degree bent hutchison needle. Port flushed easily, without resistance. Immediate blood return noted.  Flushed with 10 cc 0.9% normal saline and 5 cc heparinized saline.  Needle removed intact, sterile dressing applied.  Slight pressure applied for 30 seconds.   Patient tolerated port flush well, denies pain nor discomfort at this time. Patient instructed to leave dressing intact for a minimum of one hour, and to call with questions or concerns.  Patient states understanding and is in agreement with this plan.  Patient discharged ambulatory.

## 2019-05-17 ENCOUNTER — INFUSION THERAPY VISIT (OUTPATIENT)
Dept: INFUSION THERAPY | Facility: OTHER | Age: 79
End: 2019-05-17
Attending: NURSE PRACTITIONER
Payer: MEDICARE

## 2019-05-17 DIAGNOSIS — C91.10 CHRONIC LYMPHOCYTIC LEUKEMIA (H): Primary | ICD-10-CM

## 2019-05-17 PROCEDURE — 96523 IRRIG DRUG DELIVERY DEVICE: CPT

## 2019-05-17 PROCEDURE — 25000128 H RX IP 250 OP 636: Performed by: NURSE PRACTITIONER

## 2019-05-17 RX ORDER — HEPARIN SODIUM (PORCINE) LOCK FLUSH IV SOLN 100 UNIT/ML 100 UNIT/ML
500 SOLUTION INTRAVENOUS EVERY 8 HOURS
Status: CANCELLED
Start: 2019-05-17

## 2019-05-17 RX ORDER — HEPARIN SODIUM (PORCINE) LOCK FLUSH IV SOLN 100 UNIT/ML 100 UNIT/ML
500 SOLUTION INTRAVENOUS EVERY 8 HOURS
Status: DISCONTINUED | OUTPATIENT
Start: 2019-05-17 | End: 2019-05-17 | Stop reason: HOSPADM

## 2019-05-17 RX ADMIN — Medication 500 UNITS: at 08:41

## 2019-06-19 NOTE — PROGRESS NOTES
Oncology Follow-up Visit:  June 20, 2019    Reason for Visit:  Patient presents with:  RECHECK: chronic lymphocytic leukemia     Nursing Note and documentation reviewed: yes    HPI:    This is a 78-year-old female patient who presents to the oncology/hematology clinic today in follow up of CLL diagnosed December 2011. She completed 6 cycles of Bendamustine July 2, 2015.   She is being followed with surveillance.    She presents to the clinic today telling me she is doing well.  She continues to have some anxiety prior to these appointments nervous about how her lab counts will be.  She has had no issues with fevers, night sweats or weight loss and denies any concerns for new lumps or masses.  Appetite has been good.    Oncologic History: Jennifer was diagnosed with CLL in December 2011 after her primary care provider noted an elevated WBC on her annual exam. A peripheral blood flow for cytometry revealed she was CD5 positive, CD10 negative, consistent with CLL/SLL. Staging studies were completed and CT scan showed no evidence of lymphadenopathy or splenomegaly. She was asymptomatic with essentially stable lymphocyte count and followed with surveillance.      With patient's follow up in October 2014, hemoglobin was noted to be 10.7 with Hematocrit 30.0 and platelets 137,000. She was subsequently seen again 2 months later with further drop in hemoglobin to 8.6 with hematocrit 26.1 and platelets 140,000 with a white blood cell count of 13.5 and ANC 1.2, absolute lymphocytes were 12.1. A PET scan was completed on 12/11/2014 which did show mildly enlarged axillary lymph nodes with very mild abnormal hypermetabolism of SUV of 2 or less. Bone marrow aspiration and biopsy was completed which revealed infiltration of CLL with 90% of cells confirmed to be CLL via flow morphologically. Patient was staged at least stage III CLL based on her anemia. The plan was to proceed with chemotherapy consisting of Rituxan and Fludara. Patient  did experience hypersensitivity reaction with cycle 2 Rituxan therapy experiencing hypotension. This was discontinued and chemotherapy regime was changed to Bendamustine day 1 and day 2 every 28 days. She was started on 2/12/2015 and completed therapy in July 2015.      Current Chemo Regime/TX:  n/a  Current Cycle:  n/a  # of completed cycles:  n/a      Previous treatment: Rituxan/Fludara completing one full cycle and experienced hypersensitivity with Rituxan with second cycle;  Bendamustine 50 mg per metered squared days 1 and 2 every 28 days x6 cycles completed 7/2/15     Past Medical History:   Diagnosis Date     Chronic lymphoid leukemia, 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 2/28/2012     Diverticulosis of colon (without mention of hemorrhage) 11/29/2010     Family history of ischemic heart disease 11/29/2010     Family history of malignant neoplasm of gastrointestinal tract 10/14/2002     Non-toxic multinodular goiter 11/8/2016     Osteoporosis, unspecified 11/29/2010     Other and unspecified hyperlipidemia 11/28/2011     Viral warts, unspecified 11/28/2011       Past Surgical History:   Procedure Laterality Date     ------------OTHER-------------      elbow repair     COLONOSCOPY  01/12/2010    repeat in 2015     COLONOSCOPY       COLONOSCOPY       INSERT PORT VASCULAR ACCESS N/A 1/5/2015    Procedure: INSERT PORT VASCULAR ACCESS;  Surgeon: Linda Oliver MD;  Location: [facility]     ORTHOPEDIC SURGERY  1993    Left elbow repair       Family History   Problem Relation Age of Onset     Cancer Mother 97        colon cancer - cause of death     Colon Cancer Mother      Cancer Father      Colon Cancer Father      Diabetes Brother      Obesity Brother      Breast Cancer Daughter      Hyperlipidemia Daughter      Thyroid Disease Daughter      Hypertension Son      Hyperlipidemia Son      Thyroid Disease Cousin      Hyperlipidemia Son      Coronary Artery Disease No family hx of       Cerebrovascular Disease No family hx of      Prostate Cancer No family hx of      Other Cancer No family hx of      Anesthesia Reaction No family hx of      Asthma No family hx of      Genetic Disorder No family hx of        Social History     Socioeconomic History     Marital status:      Spouse name: Not on file     Number of children: Not on file     Years of education: Not on file     Highest education level: Not on file   Occupational History     Occupation: [employer]   Social Needs     Financial resource strain: Not on file     Food insecurity:     Worry: Not on file     Inability: Not on file     Transportation needs:     Medical: Not on file     Non-medical: Not on file   Tobacco Use     Smoking status: Never Smoker     Smokeless tobacco: Never Used   Substance and Sexual Activity     Alcohol use: No     Drug use: No     Sexual activity: Never     Comment: devorced   Lifestyle     Physical activity:     Days per week: Not on file     Minutes per session: Not on file     Stress: Not on file   Relationships     Social connections:     Talks on phone: Not on file     Gets together: Not on file     Attends Taoism service: Not on file     Active member of club or organization: Not on file     Attends meetings of clubs or organizations: Not on file     Relationship status: Not on file     Intimate partner violence:     Fear of current or ex partner: Not on file     Emotionally abused: Not on file     Physically abused: Not on file     Forced sexual activity: Not on file   Other Topics Concern     Parent/sibling w/ CABG, MI or angioplasty before 65F 55M? No      Service No     Blood Transfusions Yes     Comment: permits if needed     Caffeine Concern Yes     Comment: 1 cup     Occupational Exposure No     Hobby Hazards No     Sleep Concern No     Stress Concern No     Weight Concern No     Special Diet No     Back Care No     Exercise No     Bike Helmet Not Asked     Seat Belt Yes     Self-Exams  "Not Asked   Social History Narrative     Not on file       Current Outpatient Medications   Medication     alendronate (FOSAMAX) 70 MG tablet     Ascorbic Acid (VITAMIN C PO)     calcium gluconate 500 MG TABS     Cholecalciferol (VITAMIN D3 PO)     lidocaine-prilocaine (EMLA) cream     No current facility-administered medications for this visit.      Facility-Administered Medications Ordered in Other Visits   Medication     heparin 100 UNIT/ML injection 500 Units     sodium chloride (PF) 0.9% PF flush 10 mL        Allergies   Allergen Reactions     Sulfa Drugs Rash     Rituxan [Rituximab]      Simvastatin Other (See Comments)     Takes pravastatin at home  Zocor - myalgia       Review Of Systems:  Constitutional:    denies fever, weight changes, chills, and night sweats.  Eyes:    denies blurred or double vision; eyes are crusty in the AM  Ears/Nose/Throat:   denies ear pain, difficulty swallowing; some nasal congestion  Respiratory:   denies shortness of breath, cough   Skin:   denies rash, lesions  Cardiovascular:   denies chest pain, palpitations, edema  Gastrointestinal:   denies abdominal pain, bloating, nausea, early satiety; no change in bowel habits or blood in her stool  Genitourinary:   denies difficulty with urination, blood in urine  Musculoskeletal:    denies new muscle pain, bone pain  Neurologic:   denies lightheadedness, headaches, numbness or tingling  Psychiatric:   denies anxiety, depression  Hematologic/Lymphatic/Immunologic:   denies easy bruising, easy bleeding, lumps or bumps noted  Endocrine:   Denies increased thirst      Physical Exam:  /74   Pulse 69   Temp 97.2  F (36.2  C) (Tympanic)   Ht 1.499 m (4' 11\")   Wt 47.2 kg (104 lb)   SpO2 98%   BMI 21.01 kg/m      GENERAL APPEARANCE: Healthy, alert and in no acute distress.  HEENT: Normocephalic, Sclerae anicteric. Oropharynx without ulcers, lesions, or thrush.  NECK:   No asymmetry or masses, no thyromegaly.  LYMPHATICS: No " palpable cervical, supraclavicular, axillary, or inguinal nodes   RESP: Lungs clear to auscultation bilaterally, respirations regular and easy  CARDIOVASCULAR: Regular rate and rhythm. Normal S1, S2; no murmur, gallop, or rub.  ABDOMEN: Soft, nontender. Bowel sounds auscultated all 4 quadrants. No palpable organomegaly or masses.  MUSCULOSKELETAL: Extremities without gross deformities noted. No edema of bilateral lower extremities.  NEURO: Alert and oriented x 3.  Gait steady.  PSYCHIATRIC: Mentation and affect appear normal.  Mood appropriate.    Laboratory:  Results for orders placed or performed in visit on 06/20/19   Lactate Dehydrogenase   Result Value Ref Range    Lactate Dehydrogenase 216 81 - 234 U/L   Comprehensive metabolic panel   Result Value Ref Range    Sodium 139 133 - 144 mmol/L    Potassium 3.7 3.4 - 5.3 mmol/L    Chloride 105 94 - 109 mmol/L    Carbon Dioxide 27 20 - 32 mmol/L    Anion Gap 7 3 - 14 mmol/L    Glucose 89 70 - 99 mg/dL    Urea Nitrogen 15 7 - 30 mg/dL    Creatinine 0.70 0.52 - 1.04 mg/dL    GFR Estimate 84 >60 mL/min/[1.73_m2]    GFR Estimate If Black >90 >60 mL/min/[1.73_m2]    Calcium 9.2 8.5 - 10.1 mg/dL    Bilirubin Total 0.6 0.2 - 1.3 mg/dL    Albumin 3.8 3.4 - 5.0 g/dL    Protein Total 7.5 6.8 - 8.8 g/dL    Alkaline Phosphatase 89 40 - 150 U/L    ALT 37 0 - 50 U/L    AST 34 0 - 45 U/L   CBC with platelets differential   Result Value Ref Range    WBC 9.2 4.0 - 11.0 10e9/L    RBC Count 4.68 3.8 - 5.2 10e12/L    Hemoglobin 13.9 11.7 - 15.7 g/dL    Hematocrit 40.6 35.0 - 47.0 %    MCV 87 78 - 100 fl    MCH 29.7 26.5 - 33.0 pg    MCHC 34.2 31.5 - 36.5 g/dL    RDW 13.5 10.0 - 15.0 %    Platelet Count 226 150 - 450 10e9/L    Diff Method Automated Method     % Neutrophils 75.5 %    % Lymphocytes 16.4 %    % Monocytes 6.3 %    % Eosinophils 1.2 %    % Basophils 0.4 %    % Immature Granulocytes 0.2 %    Nucleated RBCs 0 0 /100    Absolute Neutrophil 6.9 1.6 - 8.3 10e9/L    Absolute  Lymphocytes 1.5 0.8 - 5.3 10e9/L    Absolute Monocytes 0.6 0.0 - 1.3 10e9/L    Absolute Eosinophils 0.1 0.0 - 0.7 10e9/L    Absolute Basophils 0.0 0.0 - 0.2 10e9/L    Abs Immature Granulocytes 0.0 0 - 0.4 10e9/L    Absolute Nucleated RBC 0.0        Imaging Studies: None completed for today's visit         ASSESSMENT/PLAN:    #1 CLL/SLL: Diagnosed with CLL/SLL in December 2011. Stage III. She received 6 cycles of bendamustine completing this July 2015 and is now followed with surveillance.  She is feeling good.  Labs are good with normal LDH.  She will follow up in 4 months with a CBC, CMP and LDH.      I encouraged patient to call with any questions or concerns.      Yaneli CHO, FNP-BC, AOCNP

## 2019-06-20 ENCOUNTER — INFUSION THERAPY VISIT (OUTPATIENT)
Dept: INFUSION THERAPY | Facility: OTHER | Age: 79
End: 2019-06-20
Attending: NURSE PRACTITIONER
Payer: MEDICARE

## 2019-06-20 ENCOUNTER — ONCOLOGY VISIT (OUTPATIENT)
Dept: ONCOLOGY | Facility: OTHER | Age: 79
End: 2019-06-20
Attending: NURSE PRACTITIONER
Payer: MEDICARE

## 2019-06-20 VITALS
HEART RATE: 69 BPM | DIASTOLIC BLOOD PRESSURE: 74 MMHG | WEIGHT: 104 LBS | TEMPERATURE: 97.2 F | BODY MASS INDEX: 20.96 KG/M2 | HEIGHT: 59 IN | RESPIRATION RATE: 18 BRPM | SYSTOLIC BLOOD PRESSURE: 122 MMHG | OXYGEN SATURATION: 98 %

## 2019-06-20 VITALS
BODY MASS INDEX: 20.96 KG/M2 | OXYGEN SATURATION: 98 % | WEIGHT: 104 LBS | TEMPERATURE: 97.2 F | DIASTOLIC BLOOD PRESSURE: 74 MMHG | HEIGHT: 59 IN | HEART RATE: 69 BPM | SYSTOLIC BLOOD PRESSURE: 122 MMHG

## 2019-06-20 DIAGNOSIS — C91.10 CHRONIC LYMPHOCYTIC LEUKEMIA (H): Primary | ICD-10-CM

## 2019-06-20 LAB
ALBUMIN SERPL-MCNC: 3.8 G/DL (ref 3.4–5)
ALP SERPL-CCNC: 89 U/L (ref 40–150)
ALT SERPL W P-5'-P-CCNC: 37 U/L (ref 0–50)
ANION GAP SERPL CALCULATED.3IONS-SCNC: 7 MMOL/L (ref 3–14)
AST SERPL W P-5'-P-CCNC: 34 U/L (ref 0–45)
BASOPHILS # BLD AUTO: 0 10E9/L (ref 0–0.2)
BASOPHILS NFR BLD AUTO: 0.4 %
BILIRUB SERPL-MCNC: 0.6 MG/DL (ref 0.2–1.3)
BUN SERPL-MCNC: 15 MG/DL (ref 7–30)
CALCIUM SERPL-MCNC: 9.2 MG/DL (ref 8.5–10.1)
CHLORIDE SERPL-SCNC: 105 MMOL/L (ref 94–109)
CO2 SERPL-SCNC: 27 MMOL/L (ref 20–32)
CREAT SERPL-MCNC: 0.7 MG/DL (ref 0.52–1.04)
DIFFERENTIAL METHOD BLD: NORMAL
EOSINOPHIL # BLD AUTO: 0.1 10E9/L (ref 0–0.7)
EOSINOPHIL NFR BLD AUTO: 1.2 %
ERYTHROCYTE [DISTWIDTH] IN BLOOD BY AUTOMATED COUNT: 13.5 % (ref 10–15)
GFR SERPL CREATININE-BSD FRML MDRD: 84 ML/MIN/{1.73_M2}
GLUCOSE SERPL-MCNC: 89 MG/DL (ref 70–99)
HCT VFR BLD AUTO: 40.6 % (ref 35–47)
HGB BLD-MCNC: 13.9 G/DL (ref 11.7–15.7)
IMM GRANULOCYTES # BLD: 0 10E9/L (ref 0–0.4)
IMM GRANULOCYTES NFR BLD: 0.2 %
LDH SERPL L TO P-CCNC: 216 U/L (ref 81–234)
LYMPHOCYTES # BLD AUTO: 1.5 10E9/L (ref 0.8–5.3)
LYMPHOCYTES NFR BLD AUTO: 16.4 %
MCH RBC QN AUTO: 29.7 PG (ref 26.5–33)
MCHC RBC AUTO-ENTMCNC: 34.2 G/DL (ref 31.5–36.5)
MCV RBC AUTO: 87 FL (ref 78–100)
MONOCYTES # BLD AUTO: 0.6 10E9/L (ref 0–1.3)
MONOCYTES NFR BLD AUTO: 6.3 %
NEUTROPHILS # BLD AUTO: 6.9 10E9/L (ref 1.6–8.3)
NEUTROPHILS NFR BLD AUTO: 75.5 %
NRBC # BLD AUTO: 0 10*3/UL
NRBC BLD AUTO-RTO: 0 /100
PLATELET # BLD AUTO: 226 10E9/L (ref 150–450)
POTASSIUM SERPL-SCNC: 3.7 MMOL/L (ref 3.4–5.3)
PROT SERPL-MCNC: 7.5 G/DL (ref 6.8–8.8)
RBC # BLD AUTO: 4.68 10E12/L (ref 3.8–5.2)
SODIUM SERPL-SCNC: 139 MMOL/L (ref 133–144)
WBC # BLD AUTO: 9.2 10E9/L (ref 4–11)

## 2019-06-20 PROCEDURE — G0463 HOSPITAL OUTPT CLINIC VISIT: HCPCS

## 2019-06-20 PROCEDURE — 85025 COMPLETE CBC W/AUTO DIFF WBC: CPT | Mod: ZL | Performed by: NURSE PRACTITIONER

## 2019-06-20 PROCEDURE — 83615 LACTATE (LD) (LDH) ENZYME: CPT | Mod: ZL | Performed by: NURSE PRACTITIONER

## 2019-06-20 PROCEDURE — 80053 COMPREHEN METABOLIC PANEL: CPT | Mod: ZL | Performed by: NURSE PRACTITIONER

## 2019-06-20 PROCEDURE — 99213 OFFICE O/P EST LOW 20 MIN: CPT | Performed by: NURSE PRACTITIONER

## 2019-06-20 PROCEDURE — 36415 COLL VENOUS BLD VENIPUNCTURE: CPT | Mod: ZL | Performed by: NURSE PRACTITIONER

## 2019-06-20 PROCEDURE — 25000128 H RX IP 250 OP 636: Performed by: NURSE PRACTITIONER

## 2019-06-20 PROCEDURE — 96523 IRRIG DRUG DELIVERY DEVICE: CPT

## 2019-06-20 RX ORDER — HEPARIN SODIUM (PORCINE) LOCK FLUSH IV SOLN 100 UNIT/ML 100 UNIT/ML
500 SOLUTION INTRAVENOUS EVERY 8 HOURS
Status: CANCELLED
Start: 2019-06-20

## 2019-06-20 RX ORDER — HEPARIN SODIUM (PORCINE) LOCK FLUSH IV SOLN 100 UNIT/ML 100 UNIT/ML
500 SOLUTION INTRAVENOUS EVERY 8 HOURS
Status: DISCONTINUED | OUTPATIENT
Start: 2019-06-20 | End: 2019-06-20 | Stop reason: HOSPADM

## 2019-06-20 RX ADMIN — HEPARIN 500 UNITS: 100 SYRINGE at 08:56

## 2019-06-20 ASSESSMENT — PAIN SCALES - GENERAL: PAINLEVEL: NO PAIN (0)

## 2019-06-20 ASSESSMENT — MIFFLIN-ST. JEOR
SCORE: 867.37
SCORE: 867.62

## 2019-06-20 NOTE — PROGRESS NOTES
Patients left  port accessed using non-coring, 20 gauge, 3/4 needle. Port accessed per facility protocol.  Hand hygiene performed: yes   Mask donned by caregiver: yes Site prepped with CHG: yes Labs drawn: yes Dressing applied using aseptic technique: yes Port flushed easily, without resistance. Flushed with 10 cc's normal saline.   Immediate blood return noted. 10 cc blood discarded.  2 vials blood draw and sent to lab for results.  Port flushed with 20 cc 0.9% normal saline and 5 cc heparinized saline.  Needle removed intact, sterile dressing applied.  Slight pressure applied for 30 seconds.   Patient tolerated port flush well, denies pain nor discomfort at this time. Patient instructed to leave dressing intact for a minimum of one hour, and to call with questions or concerns.  Patient states understanding and is in agreement with this plan. Patient discharged ambulatory. Dominga Cummings

## 2019-06-20 NOTE — NURSING NOTE
"Chief Complaint   Patient presents with     RECHECK     chronic lymphocytic leukemia       Initial /74   Pulse 69   Temp 97.2  F (36.2  C) (Tympanic)   Ht 1.499 m (4' 11\")   Wt 47.2 kg (104 lb)   SpO2 98%   BMI 21.01 kg/m   Estimated body mass index is 21.01 kg/m  as calculated from the following:    Height as of this encounter: 1.499 m (4' 11\").    Weight as of this encounter: 47.2 kg (104 lb).  Medication Reconciliation: complete   Immunizations reviewed; pain=0; phq9= 0; AHD on file  Yadira Metz      "

## 2019-07-22 ENCOUNTER — INFUSION THERAPY VISIT (OUTPATIENT)
Dept: INFUSION THERAPY | Facility: OTHER | Age: 79
End: 2019-07-22
Attending: NURSE PRACTITIONER
Payer: MEDICARE

## 2019-07-22 DIAGNOSIS — C91.10 CHRONIC LYMPHOCYTIC LEUKEMIA (H): Primary | ICD-10-CM

## 2019-07-22 PROCEDURE — 25000128 H RX IP 250 OP 636: Performed by: NURSE PRACTITIONER

## 2019-07-22 PROCEDURE — 96523 IRRIG DRUG DELIVERY DEVICE: CPT

## 2019-07-22 RX ORDER — HEPARIN SODIUM (PORCINE) LOCK FLUSH IV SOLN 100 UNIT/ML 100 UNIT/ML
500 SOLUTION INTRAVENOUS EVERY 8 HOURS
Status: DISCONTINUED | OUTPATIENT
Start: 2019-07-22 | End: 2019-07-22 | Stop reason: HOSPADM

## 2019-07-22 RX ORDER — HEPARIN SODIUM (PORCINE) LOCK FLUSH IV SOLN 100 UNIT/ML 100 UNIT/ML
500 SOLUTION INTRAVENOUS EVERY 8 HOURS
Status: CANCELLED
Start: 2019-07-22

## 2019-07-22 RX ADMIN — HEPARIN 500 UNITS: 100 SYRINGE at 08:39

## 2019-08-22 ENCOUNTER — INFUSION THERAPY VISIT (OUTPATIENT)
Dept: INFUSION THERAPY | Facility: OTHER | Age: 79
End: 2019-08-22
Attending: NURSE PRACTITIONER
Payer: MEDICARE

## 2019-08-22 VITALS
OXYGEN SATURATION: 95 % | HEIGHT: 59 IN | WEIGHT: 102.3 LBS | TEMPERATURE: 97.7 F | SYSTOLIC BLOOD PRESSURE: 130 MMHG | DIASTOLIC BLOOD PRESSURE: 71 MMHG | BODY MASS INDEX: 20.62 KG/M2 | HEART RATE: 63 BPM | RESPIRATION RATE: 18 BRPM

## 2019-08-22 DIAGNOSIS — C91.10 CHRONIC LYMPHOCYTIC LEUKEMIA (H): Primary | ICD-10-CM

## 2019-08-22 PROCEDURE — 96523 IRRIG DRUG DELIVERY DEVICE: CPT

## 2019-08-22 PROCEDURE — 25000128 H RX IP 250 OP 636: Performed by: NURSE PRACTITIONER

## 2019-08-22 RX ORDER — HEPARIN SODIUM (PORCINE) LOCK FLUSH IV SOLN 100 UNIT/ML 100 UNIT/ML
500 SOLUTION INTRAVENOUS EVERY 8 HOURS
Status: CANCELLED
Start: 2019-08-22

## 2019-08-22 RX ORDER — HEPARIN SODIUM (PORCINE) LOCK FLUSH IV SOLN 100 UNIT/ML 100 UNIT/ML
500 SOLUTION INTRAVENOUS EVERY 8 HOURS
Status: DISCONTINUED | OUTPATIENT
Start: 2019-08-22 | End: 2019-08-22 | Stop reason: HOSPADM

## 2019-08-22 RX ADMIN — HEPARIN 500 UNITS: 100 SYRINGE at 08:34

## 2019-08-22 ASSESSMENT — MIFFLIN-ST. JEOR: SCORE: 859.91

## 2019-08-22 NOTE — PROGRESS NOTES
Patient is a 79 y/o female here today for port flush per order of Dr. Bach.  Skin is warm, clean, dry, and intact without redness, swelling, nor other signs of infection noted.  Port accessed via sterile technique per facility protocol with a 22 gauge, 3/4 inch non coring 90 degree bent hutchison needle. Port flushed easily, without resistance. Immediate blood return noted.  Flushed with 10 cc 0.9% normal saline and 5 cc heparinized saline.  Needle removed intact, sterile dressing applied.  Slight pressure applied for 30 seconds.   Patient tolerated port flush well, denies pain nor discomfort at this time. Patient instructed to leave dressing intact for a minimum of one hour, and to call with questions or concerns.  Patient states understanding and is in agreement with this plan.  Patient discharged ambulatory.

## 2019-09-23 ENCOUNTER — INFUSION THERAPY VISIT (OUTPATIENT)
Dept: INFUSION THERAPY | Facility: OTHER | Age: 79
End: 2019-09-23
Attending: NURSE PRACTITIONER
Payer: MEDICARE

## 2019-09-23 DIAGNOSIS — C91.10 CHRONIC LYMPHOCYTIC LEUKEMIA (H): Primary | ICD-10-CM

## 2019-09-23 PROCEDURE — 25000128 H RX IP 250 OP 636: Performed by: NURSE PRACTITIONER

## 2019-09-23 PROCEDURE — 96523 IRRIG DRUG DELIVERY DEVICE: CPT

## 2019-09-23 RX ORDER — HEPARIN SODIUM (PORCINE) LOCK FLUSH IV SOLN 100 UNIT/ML 100 UNIT/ML
500 SOLUTION INTRAVENOUS EVERY 8 HOURS
Status: CANCELLED
Start: 2019-09-23

## 2019-09-23 RX ORDER — HEPARIN SODIUM (PORCINE) LOCK FLUSH IV SOLN 100 UNIT/ML 100 UNIT/ML
500 SOLUTION INTRAVENOUS EVERY 8 HOURS
Status: DISCONTINUED | OUTPATIENT
Start: 2019-09-23 | End: 2019-09-23 | Stop reason: HOSPADM

## 2019-09-23 RX ADMIN — HEPARIN 500 UNITS: 100 SYRINGE at 08:40

## 2019-10-19 NOTE — PROGRESS NOTES
Oncology Follow-up Visit:  October 21, 2019    Reason for Visit:  Patient presents with:  RECHECK: Follow up Chronic lymphocytic leukemia      Nursing Note and documentation reviewed: yes    HPI:  This is a 78-year-old female patient who presents to the oncology clinic today in follow up of CLL diagnosed December 2011. She completed 6 cycles of Bendamustine July 2, 2015.   She is being followed with surveillance.     She presents to the clinic today telling me she is doing well.  She has had no issues with night sweats, fevers or weight loss.  She has no concerning lumps.  She continues to be very active.  She essentially has no complaints today.    Oncologic History:     Jennifer was diagnosed with CLL in December 2011 after her primary care provider noted an elevated WBC on her annual exam. A peripheral blood flow for cytometry revealed she was CD5 positive, CD10 negative, consistent with CLL/SLL. Staging studies were completed and CT scan showed no evidence of lymphadenopathy or splenomegaly. She was asymptomatic with essentially stable lymphocyte count and followed with surveillance.      With patient's follow up in October 2014, hemoglobin was noted to be 10.7 with Hematocrit 30.0 and platelets 137,000. She was subsequently seen again 2 months later with further drop in hemoglobin to 8.6 with hematocrit 26.1 and platelets 140,000 with a white blood cell count of 13.5 and ANC 1.2, absolute lymphocytes were 12.1. A PET scan was completed on 12/11/2014 which did show mildly enlarged axillary lymph nodes with very mild abnormal hypermetabolism of SUV of 2 or less. Bone marrow aspiration and biopsy was completed which revealed infiltration of CLL with 90% of cells confirmed to be CLL via flow morphologically. Patient was staged at least stage III CLL based on her anemia. The plan was to proceed with chemotherapy consisting of Rituxan and Fludara. Patient did experience hypersensitivity reaction with cycle 2 Rituxan  therapy experiencing hypotension. This was discontinued and chemotherapy regime was changed to Bendamustine day 1 and day 2 every 28 days. She was started on 2/12/2015 and completed therapy in July 2015.      Current Chemo Regime/TX:  n/a  Current Cycle:  n/a  # of completed cycles:  n/a      Previous treatment: Rituxan/Fludara completing one full cycle and experienced hypersensitivity with Rituxan with second cycle;  Bendamustine 50 mg per metered squared days 1 and 2 every 28 days x6 cycles completed 7/2/15     Past Medical History:   Diagnosis Date     Chronic lymphoid leukemia, 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 2/28/2012     Diverticulosis of colon (without mention of hemorrhage) 11/29/2010     Family history of ischemic heart disease 11/29/2010     Family history of malignant neoplasm of gastrointestinal tract 10/14/2002     Non-toxic multinodular goiter 11/8/2016     Osteoporosis, unspecified 11/29/2010     Other and unspecified hyperlipidemia 11/28/2011     Viral warts, unspecified 11/28/2011       Past Surgical History:   Procedure Laterality Date     ------------OTHER-------------      elbow repair     COLONOSCOPY  01/12/2010    repeat in 2015     COLONOSCOPY       COLONOSCOPY       INSERT PORT VASCULAR ACCESS N/A 1/5/2015    Procedure: INSERT PORT VASCULAR ACCESS;  Surgeon: Linda Oliver MD;  Location: [facility]     ORTHOPEDIC SURGERY  1993    Left elbow repair       Family History   Problem Relation Age of Onset     Cancer Mother 97        colon cancer - cause of death     Colon Cancer Mother      Cancer Father      Colon Cancer Father      Diabetes Brother      Obesity Brother      Breast Cancer Daughter      Hyperlipidemia Daughter      Thyroid Disease Daughter      Hypertension Son      Hyperlipidemia Son      Thyroid Disease Cousin      Hyperlipidemia Son      Coronary Artery Disease No family hx of      Cerebrovascular Disease No family hx of      Prostate Cancer No family hx  of      Other Cancer No family hx of      Anesthesia Reaction No family hx of      Asthma No family hx of      Genetic Disorder No family hx of        Social History     Socioeconomic History     Marital status:      Spouse name: Not on file     Number of children: Not on file     Years of education: Not on file     Highest education level: Not on file   Occupational History     Occupation: [employer]   Social Needs     Financial resource strain: Not on file     Food insecurity:     Worry: Not on file     Inability: Not on file     Transportation needs:     Medical: Not on file     Non-medical: Not on file   Tobacco Use     Smoking status: Never Smoker     Smokeless tobacco: Never Used   Substance and Sexual Activity     Alcohol use: No     Drug use: No     Sexual activity: Never     Comment: devorced   Lifestyle     Physical activity:     Days per week: Not on file     Minutes per session: Not on file     Stress: Not on file   Relationships     Social connections:     Talks on phone: Not on file     Gets together: Not on file     Attends Quaker service: Not on file     Active member of club or organization: Not on file     Attends meetings of clubs or organizations: Not on file     Relationship status: Not on file     Intimate partner violence:     Fear of current or ex partner: Not on file     Emotionally abused: Not on file     Physically abused: Not on file     Forced sexual activity: Not on file   Other Topics Concern     Parent/sibling w/ CABG, MI or angioplasty before 65F 55M? No      Service No     Blood Transfusions Yes     Comment: permits if needed     Caffeine Concern Yes     Comment: 1 cup     Occupational Exposure No     Hobby Hazards No     Sleep Concern No     Stress Concern No     Weight Concern No     Special Diet No     Back Care No     Exercise No     Bike Helmet Not Asked     Seat Belt Yes     Self-Exams Not Asked   Social History Narrative     Not on file       Current  "Outpatient Medications   Medication     alendronate (FOSAMAX) 70 MG tablet     Ascorbic Acid (VITAMIN C PO)     calcium gluconate 500 MG TABS     Cholecalciferol (VITAMIN D3 PO)     lidocaine-prilocaine (EMLA) cream     No current facility-administered medications for this visit.      Facility-Administered Medications Ordered in Other Visits   Medication     heparin 100 UNIT/ML injection 500 Units     sodium chloride (PF) 0.9% PF flush 10 mL        Allergies   Allergen Reactions     Sulfa Drugs Rash     Rituxan [Rituximab]      Simvastatin Other (See Comments)     Takes pravastatin at home  Zocor - myalgia       Review Of Systems:  Constitutional:    denies fever, weight changes, chills, and night sweats.  Eyes:    denies blurred or double vision  Ears/Nose/Throat:   denies ear pain, nose problems, difficulty swallowing  Respiratory:   denies shortness of breath, cough   Skin:   denies rash, lesions  Cardiovascular:   denies chest pain, palpitations, edema  Gastrointestinal:   denies abdominal pain, bloating, nausea, early satiety; no change in bowel habits or blood in stool  Genitourinary:   denies difficulty with urination, blood in urine  Musculoskeletal:    denies new muscle pain, bone pain  Neurologic:   denies lightheadedness, headaches, numbness or tingling  Psychiatric:   denies anxiety, depression  Hematologic/Lymphatic/Immunologic:   denies easy bruising, easy bleeding, lumps or bumps noted  Endocrine:   Denies increased thirst    Physical Exam:  /62   Pulse 83   Temp 97.3  F (36.3  C) (Tympanic)   Resp 18   Ht 1.499 m (4' 11\")   Wt 47.2 kg (104 lb)   SpO2 98%   BMI 21.01 kg/m      GENERAL APPEARANCE: Healthy, alert and in no acute distress.  HEENT: Normocephalic, Sclerae anicteric. Oropharynx without ulcers, lesions, or thrush.  NECK:   No asymmetry or masses, no thyromegaly.  LYMPHATICS: No palpable cervical, supraclavicular, axillary, or inguinal nodes   RESP: Lungs clear to auscultation " bilaterally, respirations regular and easy  CARDIOVASCULAR: Regular rate and rhythm. Normal S1, S2; no murmur, gallop, or rub.  ABDOMEN: Soft, nontender. Bowel sounds auscultated all 4 quadrants. No palpable organomegaly or masses.  MUSCULOSKELETAL: Extremities without gross deformities noted. No edema of bilateral lower extremities.  NEURO: Alert and oriented x 3.  Gait steady.  PSYCHIATRIC: Mentation and affect appear normal.  Mood appropriate.    Laboratory:  Results for orders placed or performed in visit on 10/22/19   Lactate Dehydrogenase   Result Value Ref Range    Lactate Dehydrogenase 199 81 - 234 U/L   CBC with platelets differential   Result Value Ref Range    WBC 6.5 4.0 - 11.0 10e9/L    RBC Count 4.51 3.8 - 5.2 10e12/L    Hemoglobin 13.3 11.7 - 15.7 g/dL    Hematocrit 39.6 35.0 - 47.0 %    MCV 88 78 - 100 fl    MCH 29.5 26.5 - 33.0 pg    MCHC 33.6 31.5 - 36.5 g/dL    RDW 14.0 10.0 - 15.0 %    Platelet Count 255 150 - 450 10e9/L    Diff Method Automated Method     % Neutrophils 69.3 %    % Lymphocytes 20.0 %    % Monocytes 8.6 %    % Eosinophils 1.4 %    % Basophils 0.5 %    % Immature Granulocytes 0.2 %    Nucleated RBCs 0 0 /100    Absolute Neutrophil 4.5 1.6 - 8.3 10e9/L    Absolute Lymphocytes 1.3 0.8 - 5.3 10e9/L    Absolute Monocytes 0.6 0.0 - 1.3 10e9/L    Absolute Eosinophils 0.1 0.0 - 0.7 10e9/L    Absolute Basophils 0.0 0.0 - 0.2 10e9/L    Abs Immature Granulocytes 0.0 0 - 0.4 10e9/L    Absolute Nucleated RBC 0.0    Comprehensive metabolic panel   Result Value Ref Range    Sodium 141 133 - 144 mmol/L    Potassium 4.0 3.4 - 5.3 mmol/L    Chloride 108 94 - 109 mmol/L    Carbon Dioxide 28 20 - 32 mmol/L    Anion Gap 5 3 - 14 mmol/L    Glucose 84 70 - 99 mg/dL    Urea Nitrogen 17 7 - 30 mg/dL    Creatinine 0.80 0.52 - 1.04 mg/dL    GFR Estimate 71 >60 mL/min/[1.73_m2]    GFR Estimate If Black 82 >60 mL/min/[1.73_m2]    Calcium 9.0 8.5 - 10.1 mg/dL    Bilirubin Total 0.4 0.2 - 1.3 mg/dL    Albumin  3.8 3.4 - 5.0 g/dL    Protein Total 7.0 6.8 - 8.8 g/dL    Alkaline Phosphatase 64 40 - 150 U/L    ALT 24 0 - 50 U/L    AST 22 0 - 45 U/L       Imaging Studies:  None completed for today's visit       ASSESSMENT/PLAN:    #1 CLL/SLL: Diagnosed with CLL/SLL in December 2011. Stage III. She received 6 cycles of bendamustine completing this July 2015 and is now followed with surveillance.  She is feeling good.  Labs are good with normal LDH.  She will follow up in 4 months with a CBC, CMP and LDH.     I encouraged patient to call with any questions or concerns.       Yaneli Gray, NP  APRN, FNP-BC, AOCNP

## 2019-10-22 ENCOUNTER — ONCOLOGY VISIT (OUTPATIENT)
Dept: ONCOLOGY | Facility: OTHER | Age: 79
End: 2019-10-22
Attending: NURSE PRACTITIONER
Payer: MEDICARE

## 2019-10-22 ENCOUNTER — INFUSION THERAPY VISIT (OUTPATIENT)
Dept: INFUSION THERAPY | Facility: OTHER | Age: 79
End: 2019-10-22
Attending: NURSE PRACTITIONER
Payer: MEDICARE

## 2019-10-22 VITALS
WEIGHT: 104 LBS | RESPIRATION RATE: 18 BRPM | HEART RATE: 83 BPM | SYSTOLIC BLOOD PRESSURE: 134 MMHG | BODY MASS INDEX: 20.96 KG/M2 | OXYGEN SATURATION: 98 % | DIASTOLIC BLOOD PRESSURE: 62 MMHG | TEMPERATURE: 97.3 F | HEIGHT: 59 IN

## 2019-10-22 DIAGNOSIS — Z23 NEED FOR PROPHYLACTIC VACCINATION AND INOCULATION AGAINST INFLUENZA: ICD-10-CM

## 2019-10-22 DIAGNOSIS — C91.10 CHRONIC LYMPHOCYTIC LEUKEMIA (H): Primary | ICD-10-CM

## 2019-10-22 LAB
ALBUMIN SERPL-MCNC: 3.8 G/DL (ref 3.4–5)
ALP SERPL-CCNC: 64 U/L (ref 40–150)
ALT SERPL W P-5'-P-CCNC: 24 U/L (ref 0–50)
ANION GAP SERPL CALCULATED.3IONS-SCNC: 5 MMOL/L (ref 3–14)
AST SERPL W P-5'-P-CCNC: 22 U/L (ref 0–45)
BASOPHILS # BLD AUTO: 0 10E9/L (ref 0–0.2)
BASOPHILS NFR BLD AUTO: 0.5 %
BILIRUB SERPL-MCNC: 0.4 MG/DL (ref 0.2–1.3)
BUN SERPL-MCNC: 17 MG/DL (ref 7–30)
CALCIUM SERPL-MCNC: 9 MG/DL (ref 8.5–10.1)
CHLORIDE SERPL-SCNC: 108 MMOL/L (ref 94–109)
CO2 SERPL-SCNC: 28 MMOL/L (ref 20–32)
CREAT SERPL-MCNC: 0.8 MG/DL (ref 0.52–1.04)
DIFFERENTIAL METHOD BLD: NORMAL
EOSINOPHIL # BLD AUTO: 0.1 10E9/L (ref 0–0.7)
EOSINOPHIL NFR BLD AUTO: 1.4 %
ERYTHROCYTE [DISTWIDTH] IN BLOOD BY AUTOMATED COUNT: 14 % (ref 10–15)
GFR SERPL CREATININE-BSD FRML MDRD: 71 ML/MIN/{1.73_M2}
GLUCOSE SERPL-MCNC: 84 MG/DL (ref 70–99)
HCT VFR BLD AUTO: 39.6 % (ref 35–47)
HGB BLD-MCNC: 13.3 G/DL (ref 11.7–15.7)
IMM GRANULOCYTES # BLD: 0 10E9/L (ref 0–0.4)
IMM GRANULOCYTES NFR BLD: 0.2 %
LDH SERPL L TO P-CCNC: 199 U/L (ref 81–234)
LYMPHOCYTES # BLD AUTO: 1.3 10E9/L (ref 0.8–5.3)
LYMPHOCYTES NFR BLD AUTO: 20 %
MCH RBC QN AUTO: 29.5 PG (ref 26.5–33)
MCHC RBC AUTO-ENTMCNC: 33.6 G/DL (ref 31.5–36.5)
MCV RBC AUTO: 88 FL (ref 78–100)
MONOCYTES # BLD AUTO: 0.6 10E9/L (ref 0–1.3)
MONOCYTES NFR BLD AUTO: 8.6 %
NEUTROPHILS # BLD AUTO: 4.5 10E9/L (ref 1.6–8.3)
NEUTROPHILS NFR BLD AUTO: 69.3 %
NRBC # BLD AUTO: 0 10*3/UL
NRBC BLD AUTO-RTO: 0 /100
PLATELET # BLD AUTO: 255 10E9/L (ref 150–450)
POTASSIUM SERPL-SCNC: 4 MMOL/L (ref 3.4–5.3)
PROT SERPL-MCNC: 7 G/DL (ref 6.8–8.8)
RBC # BLD AUTO: 4.51 10E12/L (ref 3.8–5.2)
SODIUM SERPL-SCNC: 141 MMOL/L (ref 133–144)
WBC # BLD AUTO: 6.5 10E9/L (ref 4–11)

## 2019-10-22 PROCEDURE — 85025 COMPLETE CBC W/AUTO DIFF WBC: CPT | Mod: ZL | Performed by: NURSE PRACTITIONER

## 2019-10-22 PROCEDURE — G0463 HOSPITAL OUTPT CLINIC VISIT: HCPCS

## 2019-10-22 PROCEDURE — G0008 ADMIN INFLUENZA VIRUS VAC: HCPCS | Performed by: NURSE PRACTITIONER

## 2019-10-22 PROCEDURE — 96523 IRRIG DRUG DELIVERY DEVICE: CPT

## 2019-10-22 PROCEDURE — G0463 HOSPITAL OUTPT CLINIC VISIT: HCPCS | Mod: 25

## 2019-10-22 PROCEDURE — 90662 IIV NO PRSV INCREASED AG IM: CPT

## 2019-10-22 PROCEDURE — 80053 COMPREHEN METABOLIC PANEL: CPT | Mod: ZL | Performed by: NURSE PRACTITIONER

## 2019-10-22 PROCEDURE — 83615 LACTATE (LD) (LDH) ENZYME: CPT | Mod: ZL | Performed by: NURSE PRACTITIONER

## 2019-10-22 PROCEDURE — 90662 IIV NO PRSV INCREASED AG IM: CPT | Performed by: NURSE PRACTITIONER

## 2019-10-22 PROCEDURE — 36415 COLL VENOUS BLD VENIPUNCTURE: CPT | Mod: ZL | Performed by: NURSE PRACTITIONER

## 2019-10-22 PROCEDURE — 99213 OFFICE O/P EST LOW 20 MIN: CPT | Performed by: NURSE PRACTITIONER

## 2019-10-22 PROCEDURE — 25000128 H RX IP 250 OP 636: Performed by: NURSE PRACTITIONER

## 2019-10-22 RX ORDER — HEPARIN SODIUM (PORCINE) LOCK FLUSH IV SOLN 100 UNIT/ML 100 UNIT/ML
500 SOLUTION INTRAVENOUS EVERY 8 HOURS
Status: DISCONTINUED | OUTPATIENT
Start: 2019-10-22 | End: 2019-10-22 | Stop reason: HOSPADM

## 2019-10-22 RX ORDER — HEPARIN SODIUM (PORCINE) LOCK FLUSH IV SOLN 100 UNIT/ML 100 UNIT/ML
500 SOLUTION INTRAVENOUS EVERY 8 HOURS
Status: CANCELLED
Start: 2019-10-22

## 2019-10-22 RX ADMIN — HEPARIN 500 UNITS: 100 SYRINGE at 08:45

## 2019-10-22 ASSESSMENT — PAIN SCALES - GENERAL: PAINLEVEL: NO PAIN (0)

## 2019-10-22 ASSESSMENT — PATIENT HEALTH QUESTIONNAIRE - PHQ9: SUM OF ALL RESPONSES TO PHQ QUESTIONS 1-9: 0

## 2019-10-22 ASSESSMENT — MIFFLIN-ST. JEOR: SCORE: 862.37

## 2019-10-22 NOTE — PROGRESS NOTES
"Patients left sided port accessed using non-coring, 19 gauge, 3/4\" needle. Port accessed per facility protocol.  Hand hygiene performed: yes   Mask donned by caregiver: yes Site prepped with CHG: yes Labs drawn: yes Dressing applied using aseptic technique: yes Port flushed easily, without resistance. Flushed with 10 cc's normal saline.   Immediate blood return noted. 10 cc blood discarded.  2 vials blood draw and sent to lab for results.  Port flushed with 20 cc 0.9% normal saline and 5 cc heparinized saline.  Needle removed intact, sterile dressing applied.  Slight pressure applied for 30 seconds.   Patient tolerated port flush well, denies pain nor discomfort at this time. Patient instructed to leave dressing intact for a minimum of one hour, and to call with questions or concerns.  Patient states understanding and is in agreement with this plan. Patient discharged ambulatory. Sonia Hennessy RN    "

## 2019-10-22 NOTE — NURSING NOTE
"Chief Complaint   Patient presents with     RECHECK     Follow up Chronic lymphocytic leukemia        Initial /62   Pulse 83   Temp 97.3  F (36.3  C) (Tympanic)   Resp 18   Ht 1.499 m (4' 11\")   Wt 47.2 kg (104 lb)   SpO2 98%   BMI 21.01 kg/m   Estimated body mass index is 21.01 kg/m  as calculated from the following:    Height as of this encounter: 1.499 m (4' 11\").    Weight as of this encounter: 47.2 kg (104 lb).  Medication Reconciliation: complete.  Immunizations and advance directives status reviewed. Pain scale =0 , PHQ-9 =0.            Ana Tate LPN    "

## 2019-10-22 NOTE — PATIENT INSTRUCTIONS
We would like to see you back in 4 months. Please come 30 minutes prior for lab work.    If you have any questions please call [phone]    Other instructions:  none

## 2019-11-02 ENCOUNTER — HEALTH MAINTENANCE LETTER (OUTPATIENT)
Age: 79
End: 2019-11-02

## 2019-11-20 NOTE — PATIENT INSTRUCTIONS
Preventive Health Recommendations    See your health care provider every year to    Review health changes.     Discuss preventive care.      Review your medicines if your doctor has prescribed any.      You no longer need a yearly Pap test unless you've had an abnormal Pap test in the past 10 years. If you have vaginal symptoms, such as bleeding or discharge, be sure to talk with your provider about a Pap test.      Every 1 to 2 years, have a mammogram.  If you are over 69, talk with your health care provider about whether or not you want to continue having screening mammograms.      Every 10 years, have a colonoscopy. Or, have a yearly FIT test (stool test). These exams will check for colon cancer.       Have a cholesterol test every 5 years, or more often if your doctor advises it.       Have a diabetes test (fasting glucose) every three years. If you are at risk for diabetes, you should have this test more often.       At age 65, have a bone density scan (DEXA) to check for osteoporosis (brittle bone disease).    Shots:    Get a flu shot each year.    Get a tetanus shot every 10 years.    Talk to your doctor about your pneumonia vaccines. There are now two you should receive - Pneumovax (PPSV 23) and Prevnar (PCV 13).    Talk to your pharmacist about the shingles vaccine.    Talk to your doctor about the hepatitis B vaccine.    Nutrition:     Eat at least 5 servings of fruits and vegetables each day.      Eat whole-grain bread, whole-wheat pasta and brown rice instead of white grains and rice.      Get adequate about Calcium and Vitamin D.     Lifestyle    Exercise at least 150 minutes a week (30 minutes a day, 5 days a week). This will help you control your weight and prevent disease.      Limit alcohol to one drink per day.      No smoking.       Wear sunscreen to prevent skin cancer.       See your dentist twice a year for an exam and cleaning.      See your eye doctor every 1 to 2 years to screen for  conditions such as glaucoma, macular degeneration, cataracts, etc.    Personalized Prevention Plan  You are due for the preventive services outlined below.  Your care team is available to assist you in scheduling these services.  If you have already completed any of these items, please share that information with your care team to update in your medical record.    Health Maintenance Due   Topic Date Due     Zoster (Shingles) Vaccine (1 of 2) 10/13/1992     Annual Wellness Visit  10/13/2007     Colonoscopy  01/12/2015     Pneumococcal Vaccine (2 of 2 - PPSV23) 01/03/2017

## 2019-11-20 NOTE — PROGRESS NOTES
"SUBJECTIVE:   Jennifer Barajas is a 79 year old female who presents for Preventive Visit.      Are you in the first 12 months of your Medicare Part B coverage?  No    Physical Health:    In general, how would you rate your overall physical health? good    Outside of work, how many days during the week do you exercise? none    Outside of work, approximately how many minutes a day do you exercise?not applicable    If you drink alcohol do you typically have >3 drinks per day or >7 drinks per week? No    Do you usually eat at least 4 servings of fruit and vegetables a day, include whole grains & fiber and avoid regularly eating high fat or \"junk\" foods? NO    Do you have any problems taking medications regularly?  No    Do you have any side effects from medications? none    Needs assistance for the following daily activities: no assistance needed    Which of the following safety concerns are present in your home?  none identified     Hearing impairment: No    In the past 6 months, have you been bothered by leaking of urine? no    Mental Health:    In general, how would you rate your overall mental or emotional health? good  PHQ-2 Score:      PHQ-9 SCORE 2018 2019 10/22/2019   PHQ-9 Total Score 0 0 0     BERLIN-7 SCORE 2018   Total Score 0 1         Do you feel safe in your environment? Yes    Have you ever done Advance Care Planning? (For example, a Health Directive, POLST, or a discussion with a medical provider or your loved ones about your wishes): Yes, advance care planning is on file.    Additional concerns to address?  No    Fall risk:  Fallen 2 or more times in the past year?: No  Any fall with injury in the past year?: No    Cognitive Screenin) Repeat 3 items (Leader, Season, Table)    2) Clock draw: NORMAL  3) 3 item recall: Recalls 2 objects   Results: NORMAL clock, 1-2 items recalled: COGNITIVE IMPAIRMENT LESS LIKELY    Mini-CogTM Copyright STEPHANIE Padilla. Licensed by the author for use in " [facility]; reprinted with permission (camiloguilherme@[facility]). All rights reserved.      Do you have sleep apnea, excessive snoring or daytime drowsiness?: no        Hyperlipidemia Follow-Up      Are you having any of the following symptoms? (Select all that apply)  No complaints of shortness of breath, chest pain or pressure.  No increased sweating or nausea with activity.  No left-sided neck or arm pain.  No complaints of pain in calves when walking 1-2 blocks.    Are you regularly taking any medication or supplement to lower your cholesterol?   No    Are you having muscle aches or other side effects that you think could be caused by your cholesterol lowering medication?  No      Has had URI sx for a week-- ST/Cough/nasal congestion --- getting some better  Worse at night     Has CLL- stable     Needs DEXA - has been on Fosamax 5 +/- yrs     Reviewed and updated as needed this visit by clinical staff  Tobacco  Allergies  Meds  Med Hx  Surg Hx  Fam Hx  Soc Hx        Reviewed and updated as needed this visit by Provider        Social History     Tobacco Use     Smoking status: Never Smoker     Smokeless tobacco: Never Used   Substance Use Topics     Alcohol use: No                           Current providers sharing in care for this patient include:   Patient Care Team:  Zheng De La Rosa MD as PCP - General  Zheng De La Rosa MD as Assigned PCP    The following health maintenance items are reviewed in Epic and correct as of today:  Health Maintenance   Topic Date Due     ZOSTER IMMUNIZATION (1 of 2) 10/13/1992     MEDICARE ANNUAL WELLNESS VISIT  10/13/2007     COLONOSCOPY  01/12/2015     PNEUMOCOCCAL IMMUNIZATION 65+ HIGH/HIGHEST RISK (2 of 2 - PPSV23) 01/03/2017     FALL RISK ASSESSMENT  10/22/2020     PHQ-9  10/22/2020     LIPID  11/30/2020     ADVANCE CARE PLANNING  01/19/2022     DTAP/TDAP/TD IMMUNIZATION (4 - Td) 04/16/2027     DEXA  Completed     INFLUENZA VACCINE  Completed     IPV IMMUNIZATION  Aged  "Out     MENINGITIS IMMUNIZATION  Aged Out     Labs reviewed in EPIC      ROS:  Constitutional, HEENT, cardiovascular, pulmonary, gi and gu systems are negative, except as otherwise noted.    OBJECTIVE:   /68 (Patient Position: Sitting)   Pulse 82   Ht 1.499 m (4' 11\")   Wt 44.9 kg (99 lb)   SpO2 98%   BMI 20.00 kg/m   Estimated body mass index is 20 kg/m  as calculated from the following:    Height as of this encounter: 1.499 m (4' 11\").    Weight as of this encounter: 44.9 kg (99 lb).  EXAM:   GENERAL: healthy, alert and no distress  EYES: Eyes grossly normal to inspection, PERRL and conjunctivae and sclerae normal  HENT: ear canals and TM's normal, nose and mouth without ulcers or lesions  NECK: no adenopathy, no asymmetry, masses, or scars and thyroid normal to palpation  RESP: lungs clear to auscultation - no rales, rhonchi or wheezes  BREAST: normal without masses, tenderness or nipple discharge and no palpable axillary masses or adenopathy- chronically inverted nipples   CV: regular rate and rhythm, normal S1 S2, no S3 or S4, no murmur, click or rub, no peripheral edema and peripheral pulses strong  ABDOMEN: soft, nontender, no hepatosplenomegaly, no masses and bowel sounds normal  MS: no gross musculoskeletal defects noted, no edema  SKIN: no suspicious lesions or rashes  NEURO: Normal strength and tone, mentation intact and speech normal  PSYCH: mentation appears normal, affect normal/bright    Results for orders placed or performed in visit on 11/29/19   Lipid Profile (Chol, Trig, HDL, LDL calc)     Status: Abnormal   Result Value Ref Range    Cholesterol 261 (H) <200 mg/dL    Triglycerides 208 (H) <150 mg/dL    HDL Cholesterol 77 >49 mg/dL    LDL Cholesterol Calculated 142 (H) <100 mg/dL    Non HDL Cholesterol 184 (H) <130 mg/dL   TSH     Status: None   Result Value Ref Range    TSH 3.80 0.40 - 4.00 mU/L         ASSESSMENT / PLAN:   1. Mixed hyperlipidemia  Mild- moderate elevation. Declines " "meds. Low risk overall for CVD-- discussed. Watch diet   - Lipid Profile (Chol, Trig, HDL, LDL calc); Future    2. Non-toxic multinodular goiter  Stable   - TSH; Future    3. Age-related osteoporosis without current pathological fracture  Discussed. Repeat DEXA-- last year of Fosamax discussed. Keep active   - DX Hip/Pelvis/Spine; Future    4. Chronic lymphocytic leukemia (H)  Sees Oncology -stable     5. Encounter for screening mammogram for breast cancer  Ordered   - MA Screening Digital Bilateral; Future    6. Viral upper respiratory tract infection  Exam negative - will watch. Symptomatic treatment was discussed along what is available for OTC medications for symptomatic relief.   Symptomatic treatment was discussed along when patient should call and/or come back into the clinic or go to ER/Urgent care. All questions answered.       Discussed Shingrix - pt going to think about it.  She did try to get last year then got on waiting list      COUNSELING:  Reviewed preventive health counseling, as reflected in patient instructions       Regular exercise       Healthy diet/nutrition    Estimated body mass index is 20 kg/m  as calculated from the following:    Height as of this encounter: 1.499 m (4' 11\").    Weight as of this encounter: 44.9 kg (99 lb).         reports that she has never smoked. She has never used smokeless tobacco.      Appropriate preventive services were discussed with this patient, including applicable screening as appropriate for cardiovascular disease, diabetes, osteopenia/osteoporosis, and glaucoma.  As appropriate for age/gender, discussed screening for colorectal cancer, prostate cancer, breast cancer, and cervical cancer. Checklist reviewing preventive services available has been given to the patient.    Reviewed patients plan of care and provided an AVS. The Basic Care Plan (routine screening as documented in Health Maintenance) for Jennifer meets the Care Plan requirement. This Care Plan has " been established and reviewed with the Patient.    Counseling Resources:  ATP IV Guidelines  Pooled Cohorts Equation Calculator  Breast Cancer Risk Calculator  FRAX Risk Assessment  ICSI Preventive Guidelines  Dietary Guidelines for Americans, 2010  USDA's MyPlate  ASA Prophylaxis  Lung CA Screening    Zheng De La Rosa MD  [facility] [place]

## 2019-11-22 ENCOUNTER — INFUSION THERAPY VISIT (OUTPATIENT)
Dept: INFUSION THERAPY | Facility: OTHER | Age: 79
End: 2019-11-22
Attending: NURSE PRACTITIONER
Payer: MEDICARE

## 2019-11-22 VITALS
TEMPERATURE: 98 F | HEART RATE: 86 BPM | SYSTOLIC BLOOD PRESSURE: 98 MMHG | OXYGEN SATURATION: 96 % | DIASTOLIC BLOOD PRESSURE: 56 MMHG

## 2019-11-22 DIAGNOSIS — C91.10 CHRONIC LYMPHOCYTIC LEUKEMIA (H): Primary | ICD-10-CM

## 2019-11-22 PROCEDURE — 96523 IRRIG DRUG DELIVERY DEVICE: CPT

## 2019-11-22 PROCEDURE — 25000128 H RX IP 250 OP 636: Performed by: NURSE PRACTITIONER

## 2019-11-22 RX ORDER — HEPARIN SODIUM (PORCINE) LOCK FLUSH IV SOLN 100 UNIT/ML 100 UNIT/ML
500 SOLUTION INTRAVENOUS EVERY 8 HOURS
Status: DISCONTINUED | OUTPATIENT
Start: 2019-11-22 | End: 2019-11-22 | Stop reason: HOSPADM

## 2019-11-22 RX ORDER — HEPARIN SODIUM (PORCINE) LOCK FLUSH IV SOLN 100 UNIT/ML 100 UNIT/ML
500 SOLUTION INTRAVENOUS EVERY 8 HOURS
Status: CANCELLED
Start: 2019-11-22

## 2019-11-22 RX ADMIN — HEPARIN 500 UNITS: 100 SYRINGE at 08:31

## 2019-11-22 NOTE — PROGRESS NOTES
Hand hygiene performed: yes   Mask donned by caregiver: yes   Site prepped with CHG: yes   Labs drawn: no   Dressing applied using aseptic technique: yes    Patients left sided port accessed using non-coring, 19 gauge, 3/4 needle. Port accessed per facility protocol. Port flushed easily, blood return noted.  No signs and symptoms of infection or infiltration.  Port flushed 10 mLs Normal Saline then Heparin 5mLs of 100 unit/mL.  Needle removed, small dressing applied.  Patient discharged with no complaints.

## 2019-11-25 DIAGNOSIS — M81.0 OSTEOPOROSIS, UNSPECIFIED OSTEOPOROSIS TYPE, UNSPECIFIED PATHOLOGICAL FRACTURE PRESENCE: ICD-10-CM

## 2019-11-26 RX ORDER — ALENDRONATE SODIUM 70 MG/1
TABLET ORAL
Qty: 12 TABLET | Refills: 0 | Status: SHIPPED | OUTPATIENT
Start: 2019-11-26 | End: 2020-02-21

## 2019-11-26 NOTE — TELEPHONE ENCOUNTER
fosamax      Last Written Prescription Date:  11/27/18  Last Fill Quantity: 12,   # refills: 3  Last Office Visit: 11/27/18  Future Office visit:    Next 5 appointments (look out 90 days)    Nov 29, 2019  8:00 AM CST  (Arrive by 7:45 AM)  [facility] with Zheng De La Rosa [facility]  [facility] [address] [address]  [phone]   Feb 21, 2020  9:00 AM CST  (Arrive by 8:45 AM)  Return Visit with Yaneli Gray NP  [facility] ([facility] ) [address] [address]  [phone]           Routing refill request to provider for review/approval because:  Due for dexa scan

## 2019-11-29 ENCOUNTER — OFFICE VISIT (OUTPATIENT)
Dept: FAMILY MEDICINE | Facility: OTHER | Age: 79
End: 2019-11-29
Attending: FAMILY MEDICINE
Payer: COMMERCIAL

## 2019-11-29 VITALS
HEIGHT: 59 IN | DIASTOLIC BLOOD PRESSURE: 68 MMHG | OXYGEN SATURATION: 98 % | HEART RATE: 82 BPM | WEIGHT: 99 LBS | BODY MASS INDEX: 19.96 KG/M2 | SYSTOLIC BLOOD PRESSURE: 128 MMHG

## 2019-11-29 DIAGNOSIS — E78.2 MIXED HYPERLIPIDEMIA: Primary | ICD-10-CM

## 2019-11-29 DIAGNOSIS — E78.2 MIXED HYPERLIPIDEMIA: ICD-10-CM

## 2019-11-29 DIAGNOSIS — M81.0 AGE-RELATED OSTEOPOROSIS WITHOUT CURRENT PATHOLOGICAL FRACTURE: ICD-10-CM

## 2019-11-29 DIAGNOSIS — Z12.31 ENCOUNTER FOR SCREENING MAMMOGRAM FOR BREAST CANCER: ICD-10-CM

## 2019-11-29 DIAGNOSIS — E04.2 NON-TOXIC MULTINODULAR GOITER: ICD-10-CM

## 2019-11-29 DIAGNOSIS — C91.10 CHRONIC LYMPHOCYTIC LEUKEMIA (H): ICD-10-CM

## 2019-11-29 DIAGNOSIS — J06.9 VIRAL UPPER RESPIRATORY TRACT INFECTION: ICD-10-CM

## 2019-11-29 LAB
CHOLEST SERPL-MCNC: 261 MG/DL
HDLC SERPL-MCNC: 77 MG/DL
LDLC SERPL CALC-MCNC: 142 MG/DL
NONHDLC SERPL-MCNC: 184 MG/DL
TRIGL SERPL-MCNC: 208 MG/DL
TSH SERPL DL<=0.005 MIU/L-ACNC: 3.8 MU/L (ref 0.4–4)

## 2019-11-29 PROCEDURE — 84443 ASSAY THYROID STIM HORMONE: CPT | Mod: ZL | Performed by: FAMILY MEDICINE

## 2019-11-29 PROCEDURE — 99214 OFFICE O/P EST MOD 30 MIN: CPT | Performed by: FAMILY MEDICINE

## 2019-11-29 PROCEDURE — G0463 HOSPITAL OUTPT CLINIC VISIT: HCPCS

## 2019-11-29 PROCEDURE — 80061 LIPID PANEL: CPT | Mod: ZL | Performed by: FAMILY MEDICINE

## 2019-11-29 PROCEDURE — 36415 COLL VENOUS BLD VENIPUNCTURE: CPT | Mod: ZL | Performed by: FAMILY MEDICINE

## 2019-11-29 ASSESSMENT — MIFFLIN-ST. JEOR: SCORE: 839.69

## 2019-11-29 ASSESSMENT — PAIN SCALES - GENERAL: PAINLEVEL: NO PAIN (0)

## 2019-11-29 NOTE — NURSING NOTE
"Chief Complaint   Patient presents with     Physical       Initial /68 (Patient Position: Sitting)   Pulse 82   Ht 1.499 m (4' 11\")   Wt 44.9 kg (99 lb)   SpO2 98%   BMI 20.00 kg/m   Estimated body mass index is 20 kg/m  as calculated from the following:    Height as of this encounter: 1.499 m (4' 11\").    Weight as of this encounter: 44.9 kg (99 lb).  Medication Reconciliation: complete  Deepa Shook LPN  "

## 2019-12-10 ENCOUNTER — HOSPITAL ENCOUNTER (OUTPATIENT)
Dept: BONE DENSITY | Facility: HOSPITAL | Age: 79
Discharge: HOME | End: 2019-12-10
Attending: FAMILY MEDICINE | Admitting: FAMILY MEDICINE
Payer: MEDICARE

## 2019-12-10 ENCOUNTER — ANCILLARY PROCEDURE (OUTPATIENT)
Dept: MAMMOGRAPHY | Facility: OTHER | Age: 79
End: 2019-12-10
Attending: FAMILY MEDICINE
Payer: MEDICARE

## 2019-12-10 DIAGNOSIS — M81.0 AGE-RELATED OSTEOPOROSIS WITHOUT CURRENT PATHOLOGICAL FRACTURE: ICD-10-CM

## 2019-12-10 DIAGNOSIS — Z12.31 ENCOUNTER FOR SCREENING MAMMOGRAM FOR BREAST CANCER: ICD-10-CM

## 2019-12-10 PROCEDURE — 77063 BREAST TOMOSYNTHESIS BI: CPT | Mod: TC

## 2019-12-10 PROCEDURE — 77080 DXA BONE DENSITY AXIAL: CPT | Mod: TC

## 2019-12-20 ENCOUNTER — INFUSION THERAPY VISIT (OUTPATIENT)
Dept: INFUSION THERAPY | Facility: OTHER | Age: 79
End: 2019-12-20
Attending: NURSE PRACTITIONER
Payer: MEDICARE

## 2019-12-20 DIAGNOSIS — C91.10 CHRONIC LYMPHOCYTIC LEUKEMIA (H): Primary | ICD-10-CM

## 2019-12-20 PROCEDURE — 25000128 H RX IP 250 OP 636: Performed by: NURSE PRACTITIONER

## 2019-12-20 PROCEDURE — 96523 IRRIG DRUG DELIVERY DEVICE: CPT

## 2019-12-20 RX ORDER — HEPARIN SODIUM (PORCINE) LOCK FLUSH IV SOLN 100 UNIT/ML 100 UNIT/ML
500 SOLUTION INTRAVENOUS EVERY 8 HOURS
Status: CANCELLED
Start: 2019-12-20

## 2019-12-20 RX ORDER — HEPARIN SODIUM (PORCINE) LOCK FLUSH IV SOLN 100 UNIT/ML 100 UNIT/ML
500 SOLUTION INTRAVENOUS EVERY 8 HOURS
Status: DISCONTINUED | OUTPATIENT
Start: 2019-12-20 | End: 2019-12-20 | Stop reason: HOSPADM

## 2019-12-20 RX ADMIN — HEPARIN 500 UNITS: 100 SYRINGE at 09:14

## 2019-12-20 NOTE — PROGRESS NOTES
Patient is a 79 year old female here today for port flush per order of Dr Bach.  Skin is warm, clean, dry, and intact without redness, swelling, nor other signs of infection noted.  Port accessed via sterile technique per facility protocol with a 22 gauge, 3/4 inch non coring 90 degree bent hutchison needle. Port flushed easily, without resistance. Immediate blood return noted.  Flushed with 10 cc 0.9% normal saline and 5 cc heparinized saline.  Needle removed intact, sterile dressing applied.  Slight pressure applied for 30 seconds.   Patient tolerated port flush well, denies pain nor discomfort at this time. Patient instructed to leave dressing intact for a minimum of one hour, and to call with questions or concerns.  Patient states understanding and is in agreement with this plan.  Patient discharged ambulatory.  Dominga Cummings RN

## 2020-01-20 ENCOUNTER — INFUSION THERAPY VISIT (OUTPATIENT)
Dept: INFUSION THERAPY | Facility: OTHER | Age: 80
End: 2020-01-20
Attending: NURSE PRACTITIONER
Payer: COMMERCIAL

## 2020-01-20 DIAGNOSIS — C91.10 CHRONIC LYMPHOCYTIC LEUKEMIA (H): Primary | ICD-10-CM

## 2020-01-20 PROCEDURE — 96523 IRRIG DRUG DELIVERY DEVICE: CPT

## 2020-01-20 PROCEDURE — 25000128 H RX IP 250 OP 636: Performed by: NURSE PRACTITIONER

## 2020-01-20 RX ORDER — HEPARIN SODIUM (PORCINE) LOCK FLUSH IV SOLN 100 UNIT/ML 100 UNIT/ML
500 SOLUTION INTRAVENOUS EVERY 8 HOURS
Status: CANCELLED
Start: 2020-01-20

## 2020-01-20 RX ORDER — HEPARIN SODIUM (PORCINE) LOCK FLUSH IV SOLN 100 UNIT/ML 100 UNIT/ML
500 SOLUTION INTRAVENOUS EVERY 8 HOURS
Status: DISCONTINUED | OUTPATIENT
Start: 2020-01-20 | End: 2020-01-20 | Stop reason: HOSPADM

## 2020-01-20 RX ADMIN — HEPARIN 500 UNITS: 100 SYRINGE at 08:28

## 2020-01-20 NOTE — PROGRESS NOTES
Hand hygiene performed: yes   Mask donned by caregiver: yes   Site prepped with CHG: yes   Labs drawn: no   Dressing applied using aseptic technique: yes    Patients left sided port accessed using non-coring, 20 gauge, 3/4 inch needle. Port accessed per facility protocol. Port flushed easily, blood return noted after much repositioning.  No signs and symptoms of infection or infiltration.  Port flushed 10 mLs Normal Saline then Heparin 5mLs of 100 unit/mL.  Needle removed, small dressing applied.  Patient discharged with no complaints.

## 2020-01-20 NOTE — PATIENT INSTRUCTIONS
We will see you back as planned. If you have any questions or concerns, we can be reached Monday through Friday 8am - 430pm at [phone] (Care Coordinator), if unable to reach them, you can try [phone] ([facility]), [phone] (LPN with Dr Bach), or [phone] (Infusion). If you have concerns related to a potential reaction/side effect after hours/weekends/holiday's, please seek emergent medical care.

## 2020-02-10 ENCOUNTER — HEALTH MAINTENANCE LETTER (OUTPATIENT)
Age: 80
End: 2020-02-10

## 2020-02-18 NOTE — PROGRESS NOTES
Oncology Follow-up Visit:  February 19, 2020    Reason for Visit:  Patient presents with:  RECHECK: Chronic lymphocytic leukemia      Nursing Note and documentation reviewed: yes    HPI:  This is a 79-year-old female patient who presents to the oncology clinic today in follow up of CLL diagnosed December 2011. She completed 6 cycles of Bendamustine July 2, 2015.   She is being followed with surveillance.    She presents to the clinic today stating she is doing good.  She denies any issues with fevers, night sweats or weight loss.  She did have some abdominal pain at the end of January which she feels was related to diverticulosis and states she went on a liquid diet for about 3 days and now her bowel movements have been normal.  She denies any further abdominal pain and denies any blood in her stools.  She denies any early satiety or nausea.    Oncologic History:     Jennifer was diagnosed with CLL in December 2011 after her primary care provider noted an elevated WBC on her annual exam. A peripheral blood flow for cytometry revealed she was CD5 positive, CD10 negative, consistent with CLL/SLL. Staging studies were completed and CT scan showed no evidence of lymphadenopathy or splenomegaly. She was asymptomatic with essentially stable lymphocyte count and followed with surveillance.      With patient's follow up in October 2014, hemoglobin was noted to be 10.7 with Hematocrit 30.0 and platelets 137,000. She was subsequently seen again 2 months later with further drop in hemoglobin to 8.6 with hematocrit 26.1 and platelets 140,000 with a white blood cell count of 13.5 and ANC 1.2, absolute lymphocytes were 12.1. A PET scan was completed on 12/11/2014 which did show mildly enlarged axillary lymph nodes with very mild abnormal hypermetabolism of SUV of 2 or less. Bone marrow aspiration and biopsy was completed which revealed infiltration of CLL with 90% of cells confirmed to be CLL via flow morphologically. Patient was  staged at least stage III CLL based on her anemia. The plan was to proceed with chemotherapy consisting of Rituxan and Fludara. Patient did experience hypersensitivity reaction with cycle 2 Rituxan therapy experiencing hypotension. This was discontinued and chemotherapy regime was changed to Bendamustine day 1 and day 2 every 28 days. She was started on 2/12/2015 and completed therapy in July 2015.      Current Chemo Regime/TX:  n/a  Current Cycle:  n/a  # of completed cycles:  n/a      Previous treatment: Rituxan/Fludara completing one full cycle and experienced hypersensitivity with Rituxan with second cycle;  Bendamustine 50 mg per metered squared days 1 and 2 every 28 days x6 cycles completed 7/2/15      Past Medical History:   Diagnosis Date     Arthritis     back, fingers, legs     Cancer ([facility])     chronic lymphocytic leukemia     Chronic lymphoid leukemia, 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 2/28/2012     Diverticulosis of colon (without mention of hemorrhage) 11/29/2010     Family history of ischemic heart disease 11/29/2010     Family history of malignant neoplasm of gastrointestinal tract 10/14/2002     History of blood transfusion     prior to chemo     Non-toxic multinodular goiter 11/8/2016     Osteoporosis, unspecified 11/29/2010     Other and unspecified hyperlipidemia 11/28/2011     Viral warts, unspecified 11/28/2011       Past Surgical History:   Procedure Laterality Date     ------------OTHER-------------      elbow repair     COLONOSCOPY  01/12/2010    repeat in 2015     COLONOSCOPY      SEVERAL COLONOSCOPIES - ALL BEEN NORMAL - REFUSES ANY MORE      COLONOSCOPY       INSERT PORT VASCULAR ACCESS N/A 1/5/2015    Procedure: INSERT PORT VASCULAR ACCESS;  Surgeon: Linda Oliver MD;  Location: [facility]     ORTHOPEDIC SURGERY  1993    Left elbow repair       Family History   Problem Relation Age of Onset     Cancer Mother 97        colon cancer - cause of death     Colon Cancer  Mother      Cancer Father      Colon Cancer Father      Diabetes Brother      Obesity Brother      Breast Cancer Daughter      Hyperlipidemia Daughter      Thyroid Disease Daughter      Hypertension Son      Hyperlipidemia Son      Thyroid Disease Cousin      Hyperlipidemia Son      Coronary Artery Disease No family hx of      Cerebrovascular Disease No family hx of      Prostate Cancer No family hx of      Other Cancer No family hx of      Anesthesia Reaction No family hx of      Asthma No family hx of      Genetic Disorder No family hx of        Social History     Socioeconomic History     Marital status:      Spouse name: Not on file     Number of children: Not on file     Years of education: Not on file     Highest education level: Not on file   Occupational History     Occupation: [employer]   Social Needs     Financial resource strain: Not on file     Food insecurity:     Worry: Not on file     Inability: Not on file     Transportation needs:     Medical: Not on file     Non-medical: Not on file   Tobacco Use     Smoking status: Never Smoker     Smokeless tobacco: Never Used   Substance and Sexual Activity     Alcohol use: No     Drug use: No     Sexual activity: Never     Comment: devorced   Lifestyle     Physical activity:     Days per week: Not on file     Minutes per session: Not on file     Stress: Not on file   Relationships     Social connections:     Talks on phone: Not on file     Gets together: Not on file     Attends Yarsanism service: Not on file     Active member of club or organization: Not on file     Attends meetings of clubs or organizations: Not on file     Relationship status: Not on file     Intimate partner violence:     Fear of current or ex partner: Not on file     Emotionally abused: Not on file     Physically abused: Not on file     Forced sexual activity: Not on file   Other Topics Concern     Parent/sibling w/ CABG, MI or angioplasty before 65F 55M? No      Service No  "    Blood Transfusions Yes     Comment: permits if needed     Caffeine Concern Yes     Comment: 1 cup     Occupational Exposure No     Hobby Hazards No     Sleep Concern No     Stress Concern No     Weight Concern No     Special Diet No     Back Care No     Exercise No     Bike Helmet Not Asked     Seat Belt Yes     Self-Exams Not Asked   Social History Narrative     Not on file       Current Outpatient Medications   Medication     alendronate (FOSAMAX) 70 MG tablet     Ascorbic Acid (VITAMIN C PO)     calcium gluconate 500 MG TABS     Cholecalciferol (VITAMIN D3 PO)     lidocaine-prilocaine (EMLA) cream     No current facility-administered medications for this visit.         Allergies   Allergen Reactions     Sulfa Drugs Rash     Rituxan [Rituximab]      Simvastatin Other (See Comments)     Takes pravastatin at home  Zocor - myalgia       Review Of Systems:  Constitutional:    denies fever, weight changes, chills, and night sweats.  Eyes:    denies blurred or double vision  Ears/Nose/Throat:   denies ear pain, nose problems, difficulty swallowing  Respiratory:   denies shortness of breath, cough  Skin:   denies rash, lesions  Cardiovascular:   denies chest pain, palpitations, edema  Gastrointestinal:   See hPI  Genitourinary:   denies difficulty with urination, blood in urine  Musculoskeletal:    Mid back pain since shoveling-not new  Neurologic:   denies lightheadedness, headaches  Psychiatric:   Denies depression-some anxiety related to family issues and prior to this appointment  Hematologic/Lymphatic/Immunologic:   denies easy bruising, lumps or bumps noted; blood on tissue when blows nose at times-using humidifier  Endocrine:   Denies increased thirst      Physical Exam:  /62   Pulse 77   Temp 97.6  F (36.4  C) (Tympanic)   Ht 1.499 m (4' 11\")   Wt 46.3 kg (102 lb 1.2 oz)   SpO2 98%   BMI 20.62 kg/m      GENERAL APPEARANCE: Healthy, alert and in no acute distress.  HEENT: Normocephalic, Sclerae " anicteric. Oropharynx without ulcers, lesions, or thrush.  NECK:   No asymmetry or masses, no thyromegaly.  LYMPHATICS: No palpable cervical, supraclavicular, axillary, or inguinal nodes   RESP: Lungs clear to auscultation bilaterally, respirations regular and easy  CARDIOVASCULAR: Regular rate and rhythm. Normal S1, S2; no murmur, gallop, or rub.  ABDOMEN: Soft, nontender. Bowel sounds auscultated all 4 quadrants. No palpable organomegaly or masses.  MUSCULOSKELETAL: Extremities without gross deformities noted. No edema of bilateral lower extremities.  NEURO: Alert and oriented x 3.  Gait steady.  PSYCHIATRIC: Mentation and affect appear normal.  Mood appropriate.    Laboratory:  Results for orders placed or performed in visit on 02/19/20   CBC with platelets differential     Status: None   Result Value Ref Range    WBC 10.2 4.0 - 11.0 10e9/L    RBC Count 4.71 3.8 - 5.2 10e12/L    Hemoglobin 13.8 11.7 - 15.7 g/dL    Hematocrit 38.4 35.0 - 47.0 %    MCV 82 78 - 100 fl    MCH 29.3 26.5 - 33.0 pg    MCHC 35.9 31.5 - 36.5 g/dL    RDW 13.4 10.0 - 15.0 %    Platelet Count 242 150 - 450 10e9/L    Diff Method Automated Method     % Neutrophils 81.8 %    % Lymphocytes 11.2 %    % Monocytes 5.6 %    % Eosinophils 0.7 %    % Basophils 0.4 %    % Immature Granulocytes 0.3 %    Nucleated RBCs 0 0 /100    Absolute Neutrophil 8.3 1.6 - 8.3 10e9/L    Absolute Lymphocytes 1.1 0.8 - 5.3 10e9/L    Absolute Monocytes 0.6 0.0 - 1.3 10e9/L    Absolute Eosinophils 0.1 0.0 - 0.7 10e9/L    Absolute Basophils 0.0 0.0 - 0.2 10e9/L    Abs Immature Granulocytes 0.0 0 - 0.4 10e9/L    Absolute Nucleated RBC 0.0    Comprehensive metabolic panel     Status: None   Result Value Ref Range    Sodium 139 133 - 144 mmol/L    Potassium 3.6 3.4 - 5.3 mmol/L    Chloride 105 94 - 109 mmol/L    Carbon Dioxide 27 20 - 32 mmol/L    Anion Gap 7 3 - 14 mmol/L    Glucose 97 70 - 99 mg/dL    Urea Nitrogen 16 7 - 30 mg/dL    Creatinine 0.74 0.52 - 1.04 mg/dL     GFR Estimate 78 >60 mL/min/[1.73_m2]    GFR Estimate If Black >90 >60 mL/min/[1.73_m2]    Calcium 9.1 8.5 - 10.1 mg/dL    Bilirubin Total 0.6 0.2 - 1.3 mg/dL    Albumin 3.8 3.4 - 5.0 g/dL    Protein Total 7.3 6.8 - 8.8 g/dL    Alkaline Phosphatase 81 40 - 150 U/L    ALT 31 0 - 50 U/L    AST 30 0 - 45 U/L   Lactate Dehydrogenase     Status: None   Result Value Ref Range    Lactate Dehydrogenase 205 81 - 234 U/L       Imaging Studies:  None completed for today's visit       ASSESSMENT/PLAN:    #1 CLL/SLL: Diagnosed with CLL/SLL in December 2011. Stage III. She received 6 cycles of bendamustine completing this July 2015 and is now followed with surveillance.  She is feeling good.  Labs are good with normal LDH.  She will follow up in 6 months with a CBC, CMP and LDH.     I encouraged patient to call with any questions or concerns.       Yaenli Gray, NP  APRN, FNP-BC, AOCNP

## 2020-02-19 ENCOUNTER — INFUSION THERAPY VISIT (OUTPATIENT)
Dept: INFUSION THERAPY | Facility: OTHER | Age: 80
End: 2020-02-19
Attending: NURSE PRACTITIONER
Payer: COMMERCIAL

## 2020-02-19 ENCOUNTER — ONCOLOGY VISIT (OUTPATIENT)
Dept: ONCOLOGY | Facility: OTHER | Age: 80
End: 2020-02-19
Attending: NURSE PRACTITIONER
Payer: COMMERCIAL

## 2020-02-19 VITALS
DIASTOLIC BLOOD PRESSURE: 62 MMHG | OXYGEN SATURATION: 98 % | BODY MASS INDEX: 20.58 KG/M2 | TEMPERATURE: 97.6 F | HEIGHT: 59 IN | HEART RATE: 77 BPM | WEIGHT: 102.07 LBS | SYSTOLIC BLOOD PRESSURE: 138 MMHG

## 2020-02-19 DIAGNOSIS — C91.10 CHRONIC LYMPHOCYTIC LEUKEMIA (H): Primary | ICD-10-CM

## 2020-02-19 LAB
ALBUMIN SERPL-MCNC: 3.8 G/DL (ref 3.4–5)
ALP SERPL-CCNC: 81 U/L (ref 40–150)
ALT SERPL W P-5'-P-CCNC: 31 U/L (ref 0–50)
ANION GAP SERPL CALCULATED.3IONS-SCNC: 7 MMOL/L (ref 3–14)
AST SERPL W P-5'-P-CCNC: 30 U/L (ref 0–45)
BASOPHILS # BLD AUTO: 0 10E9/L (ref 0–0.2)
BASOPHILS NFR BLD AUTO: 0.4 %
BILIRUB SERPL-MCNC: 0.6 MG/DL (ref 0.2–1.3)
BUN SERPL-MCNC: 16 MG/DL (ref 7–30)
CALCIUM SERPL-MCNC: 9.1 MG/DL (ref 8.5–10.1)
CHLORIDE SERPL-SCNC: 105 MMOL/L (ref 94–109)
CO2 SERPL-SCNC: 27 MMOL/L (ref 20–32)
CREAT SERPL-MCNC: 0.74 MG/DL (ref 0.52–1.04)
DIFFERENTIAL METHOD BLD: NORMAL
EOSINOPHIL # BLD AUTO: 0.1 10E9/L (ref 0–0.7)
EOSINOPHIL NFR BLD AUTO: 0.7 %
ERYTHROCYTE [DISTWIDTH] IN BLOOD BY AUTOMATED COUNT: 13.4 % (ref 10–15)
GFR SERPL CREATININE-BSD FRML MDRD: 78 ML/MIN/{1.73_M2}
GLUCOSE SERPL-MCNC: 97 MG/DL (ref 70–99)
HCT VFR BLD AUTO: 38.4 % (ref 35–47)
HGB BLD-MCNC: 13.8 G/DL (ref 11.7–15.7)
IMM GRANULOCYTES # BLD: 0 10E9/L (ref 0–0.4)
IMM GRANULOCYTES NFR BLD: 0.3 %
LDH SERPL L TO P-CCNC: 205 U/L (ref 81–234)
LYMPHOCYTES # BLD AUTO: 1.1 10E9/L (ref 0.8–5.3)
LYMPHOCYTES NFR BLD AUTO: 11.2 %
MCH RBC QN AUTO: 29.3 PG (ref 26.5–33)
MCHC RBC AUTO-ENTMCNC: 35.9 G/DL (ref 31.5–36.5)
MCV RBC AUTO: 82 FL (ref 78–100)
MONOCYTES # BLD AUTO: 0.6 10E9/L (ref 0–1.3)
MONOCYTES NFR BLD AUTO: 5.6 %
NEUTROPHILS # BLD AUTO: 8.3 10E9/L (ref 1.6–8.3)
NEUTROPHILS NFR BLD AUTO: 81.8 %
NRBC # BLD AUTO: 0 10*3/UL
NRBC BLD AUTO-RTO: 0 /100
PLATELET # BLD AUTO: 242 10E9/L (ref 150–450)
POTASSIUM SERPL-SCNC: 3.6 MMOL/L (ref 3.4–5.3)
PROT SERPL-MCNC: 7.3 G/DL (ref 6.8–8.8)
RBC # BLD AUTO: 4.71 10E12/L (ref 3.8–5.2)
SODIUM SERPL-SCNC: 139 MMOL/L (ref 133–144)
WBC # BLD AUTO: 10.2 10E9/L (ref 4–11)

## 2020-02-19 PROCEDURE — G0463 HOSPITAL OUTPT CLINIC VISIT: HCPCS

## 2020-02-19 PROCEDURE — 96523 IRRIG DRUG DELIVERY DEVICE: CPT

## 2020-02-19 PROCEDURE — 36415 COLL VENOUS BLD VENIPUNCTURE: CPT | Mod: ZL | Performed by: NURSE PRACTITIONER

## 2020-02-19 PROCEDURE — 85025 COMPLETE CBC W/AUTO DIFF WBC: CPT | Mod: ZL | Performed by: NURSE PRACTITIONER

## 2020-02-19 PROCEDURE — 80053 COMPREHEN METABOLIC PANEL: CPT | Mod: ZL | Performed by: NURSE PRACTITIONER

## 2020-02-19 PROCEDURE — 83615 LACTATE (LD) (LDH) ENZYME: CPT | Mod: ZL | Performed by: NURSE PRACTITIONER

## 2020-02-19 PROCEDURE — 25000128 H RX IP 250 OP 636: Performed by: NURSE PRACTITIONER

## 2020-02-19 PROCEDURE — 99213 OFFICE O/P EST LOW 20 MIN: CPT | Performed by: NURSE PRACTITIONER

## 2020-02-19 RX ORDER — HEPARIN SODIUM (PORCINE) LOCK FLUSH IV SOLN 100 UNIT/ML 100 UNIT/ML
500 SOLUTION INTRAVENOUS EVERY 8 HOURS
Status: DISCONTINUED | OUTPATIENT
Start: 2020-02-19 | End: 2020-02-19 | Stop reason: HOSPADM

## 2020-02-19 RX ORDER — HEPARIN SODIUM (PORCINE) LOCK FLUSH IV SOLN 100 UNIT/ML 100 UNIT/ML
500 SOLUTION INTRAVENOUS EVERY 8 HOURS
Status: CANCELLED
Start: 2020-02-19

## 2020-02-19 RX ADMIN — HEPARIN 500 UNITS: 100 SYRINGE at 07:40

## 2020-02-19 ASSESSMENT — MIFFLIN-ST. JEOR: SCORE: 853.63

## 2020-02-19 ASSESSMENT — PAIN SCALES - GENERAL: PAINLEVEL: MILD PAIN (2)

## 2020-02-19 NOTE — PATIENT INSTRUCTIONS
We would like to see you back in 6 months. Please come 30 minutes prior for lab work.     If you have any questions please call [phone]    Other instructions:  none

## 2020-02-19 NOTE — NURSING NOTE
"Chief Complaint   Patient presents with     RECHECK     Chronic lymphocytic leukemia        Initial /62   Pulse 77   Temp 97.6  F (36.4  C) (Tympanic)   Ht 1.499 m (4' 11\")   Wt 46.3 kg (102 lb 1.2 oz)   SpO2 98%   BMI 20.62 kg/m   Estimated body mass index is 20.62 kg/m  as calculated from the following:    Height as of this encounter: 1.499 m (4' 11\").    Weight as of this encounter: 46.3 kg (102 lb 1.2 oz).  Medication Reconciliation: complete     Immunizations and advance directives status reviewed. Pain scale 2 , PHQ-9 -.          Yadira Metz LPN  "

## 2020-02-19 NOTE — PROGRESS NOTES
Patients left  port accessed using non-coring, 20 gauge, 3/4 power needle. Port accessed per facility protocol.  Hand hygiene performed: yes   Mask donned by caregiver: yes Site prepped with CHG: yes Labs drawn: yes Dressing applied using aseptic technique: yes Port flushed easily, without resistance. Flushed with 10 cc's normal saline.   Immediate blood return noted. 10 cc blood discarded.  2 vials blood draw and sent to lab for results.  Port flushed with 20 cc 0.9% normal saline and 5 cc heparinized saline.  Needle removed intact, sterile dressing applied.  Slight pressure applied for 30 seconds.   Patient tolerated port flush well, denies pain nor discomfort at this time. Patient instructed to leave dressing intact for a minimum of one hour, and to call with questions or concerns.  Patient states understanding and is in agreement with this plan. Patient discharged ambulatory. Dominga Cummings RN

## 2020-02-19 NOTE — PATIENT INSTRUCTIONS
We will see you back as planned. If you have any questions or concerns, we can be reached Monday through Friday 8am - 430pm at [phone] (Care Coordinator), if unable to reach them, you can try [phone] ([facility]), [phone] (LPN with Dr Bach), or [phone] (Infusion). If you have concerns related to a potential reaction/side effect after hours/weekends/holiday's, please seek emergent medical care.

## 2020-02-21 DIAGNOSIS — M81.0 OSTEOPOROSIS, UNSPECIFIED OSTEOPOROSIS TYPE, UNSPECIFIED PATHOLOGICAL FRACTURE PRESENCE: ICD-10-CM

## 2020-02-21 RX ORDER — ALENDRONATE SODIUM 70 MG/1
TABLET ORAL
Qty: 12 TABLET | Refills: 3 | Status: SHIPPED | OUTPATIENT
Start: 2020-02-21 | End: 2021-02-23

## 2020-03-18 ENCOUNTER — INFUSION THERAPY VISIT (OUTPATIENT)
Dept: INFUSION THERAPY | Facility: OTHER | Age: 80
End: 2020-03-18
Attending: NURSE PRACTITIONER
Payer: COMMERCIAL

## 2020-03-18 DIAGNOSIS — C91.10 CHRONIC LYMPHOCYTIC LEUKEMIA (H): Primary | ICD-10-CM

## 2020-03-18 PROCEDURE — 96523 IRRIG DRUG DELIVERY DEVICE: CPT

## 2020-03-18 PROCEDURE — 25000128 H RX IP 250 OP 636: Performed by: NURSE PRACTITIONER

## 2020-03-18 RX ORDER — HEPARIN SODIUM (PORCINE) LOCK FLUSH IV SOLN 100 UNIT/ML 100 UNIT/ML
500 SOLUTION INTRAVENOUS EVERY 8 HOURS
Status: CANCELLED
Start: 2020-03-18

## 2020-03-18 RX ORDER — HEPARIN SODIUM (PORCINE) LOCK FLUSH IV SOLN 100 UNIT/ML 100 UNIT/ML
500 SOLUTION INTRAVENOUS EVERY 8 HOURS
Status: DISCONTINUED | OUTPATIENT
Start: 2020-03-18 | End: 2020-03-18 | Stop reason: HOSPADM

## 2020-03-18 RX ADMIN — HEPARIN 500 UNITS: 100 SYRINGE at 08:35

## 2020-03-18 NOTE — PROGRESS NOTES
Patients left sided  port accessed using non-coring, 19  gauge, power  needle. Port accessed per facility protocol.   Hand hygiene performed: yes  Mask donned by caregiver: yes   Site prepped with CHG: yes   Labs drawn: no   Dressing applied using aseptic technique: yes   Port flushed easily, blood return noted.  No signs and symptoms of infection or infiltration.  Port flushed 10 mLs Normal Saline then Heparin 5mLs of 100 unit/mL.  Needle removed, small dressing applied.  Patient discharged with no complaints.

## 2020-04-15 ENCOUNTER — INFUSION THERAPY VISIT (OUTPATIENT)
Dept: INFUSION THERAPY | Facility: OTHER | Age: 80
End: 2020-04-15
Attending: NURSE PRACTITIONER
Payer: COMMERCIAL

## 2020-04-15 DIAGNOSIS — C91.10 CHRONIC LYMPHOCYTIC LEUKEMIA (H): Primary | ICD-10-CM

## 2020-04-15 PROCEDURE — 96523 IRRIG DRUG DELIVERY DEVICE: CPT

## 2020-04-15 PROCEDURE — 25000128 H RX IP 250 OP 636: Performed by: NURSE PRACTITIONER

## 2020-04-15 RX ORDER — HEPARIN SODIUM (PORCINE) LOCK FLUSH IV SOLN 100 UNIT/ML 100 UNIT/ML
500 SOLUTION INTRAVENOUS EVERY 8 HOURS
Status: CANCELLED
Start: 2020-04-15

## 2020-04-15 RX ORDER — HEPARIN SODIUM (PORCINE) LOCK FLUSH IV SOLN 100 UNIT/ML 100 UNIT/ML
500 SOLUTION INTRAVENOUS EVERY 8 HOURS
Status: DISCONTINUED | OUTPATIENT
Start: 2020-04-15 | End: 2020-04-15 | Stop reason: HOSPADM

## 2020-04-15 RX ADMIN — HEPARIN 500 UNITS: 100 SYRINGE at 08:37

## 2020-05-13 ENCOUNTER — INFUSION THERAPY VISIT (OUTPATIENT)
Dept: INFUSION THERAPY | Facility: OTHER | Age: 80
End: 2020-05-13
Attending: NURSE PRACTITIONER
Payer: COMMERCIAL

## 2020-05-13 ENCOUNTER — TELEPHONE (OUTPATIENT)
Dept: ONCOLOGY | Facility: OTHER | Age: 80
End: 2020-05-13

## 2020-05-13 DIAGNOSIS — C91.10 CHRONIC LYMPHOCYTIC LEUKEMIA (H): Primary | ICD-10-CM

## 2020-05-13 PROCEDURE — 96523 IRRIG DRUG DELIVERY DEVICE: CPT

## 2020-05-13 RX ORDER — HEPARIN SODIUM (PORCINE) LOCK FLUSH IV SOLN 100 UNIT/ML 100 UNIT/ML
500 SOLUTION INTRAVENOUS EVERY 8 HOURS
Status: CANCELLED
Start: 2020-05-13

## 2020-05-13 RX ORDER — HEPARIN SODIUM (PORCINE) LOCK FLUSH IV SOLN 100 UNIT/ML 100 UNIT/ML
500 SOLUTION INTRAVENOUS EVERY 8 HOURS
Status: DISCONTINUED | OUTPATIENT
Start: 2020-05-13 | End: 2020-05-13 | Stop reason: HOSPADM

## 2020-05-13 NOTE — TELEPHONE ENCOUNTER
Per Sonia Hennessy RN Dr. Bach gave verbal orders for fluoroscopy and cath charisse as indicated.  Patient set up with IR for fluro on Wednesday 5/20/20 at 1115 and Cath charisse to follow if indicated.  Patient notified and verbalizes understanding of appointment dates, times and locations.

## 2020-05-13 NOTE — TELEPHONE ENCOUNTER
Patient here for Port flush today.  Unable to access blood return.  Patient had a fluoroscopy approx 5 years ago, no sheath was noted at this time.  Patient willing to go in for fluoroscopy or come in for cath charisse depending on your recommendations.  Secondarily patient is not actively recievign treatment and may be canidate  for port removal.  She would prefer to not have it removed.      Please advise on okay to proceed with ordering a fluoroscopyor just initiate  Cath charisse Please advise.

## 2020-05-13 NOTE — PROGRESS NOTES
Patients left sided  port accessed using non-coring, 19 gauge, 3/4  needle. Port accessed per facility protocol.   Hand hygiene performed: yes   Mask donned by caregiver: yes   Site prepped with CHG: yes   Labs drawn: no   Dressing applied using aseptic technique: yes   Port flushed easily, NO blood return noted.  No signs and symptoms of infection or infiltration.  Port flushed 10 mLs Normal Saline then Heparin 5mLs of 100 unit/mL.  Needle removed, small dressing applied. Will contact patient provider to discuss if patient will need a fluoroscopy or can be scheduled for cath charisse.

## 2020-05-13 NOTE — PROGRESS NOTES
Patient notified that Port check will occur at 1115 on Wednesday 5/20/2020 will report to infusion right after.  Patient verbalizes understanding.

## 2020-05-13 NOTE — PROGRESS NOTES
Per Sonia KRAFT RN TC from Dr. Mikala gallardo to proceed with a fluoroscopy and cath charisse as indicated by fluorsoscopy.   Spoke with DI and patient is scheduled for Wednesday 5/20/2020 at 1130.  [facility] [place] to follow.  Patient to be notified.

## 2020-05-19 ENCOUNTER — TELEPHONE (OUTPATIENT)
Dept: ONCOLOGY | Facility: OTHER | Age: 80
End: 2020-05-19

## 2020-05-19 NOTE — TELEPHONE ENCOUNTER
I spoke with patient today regarding her apprehension for having the fluoroscopy/cath charisse.  Patient concerned about what will be done in the event there is damage to the catheter of the port.  Patient reassured that provider will be contacted if there is damage to the catheter line.  Also re explained how catheter flow works and how long she may have to stay at the clinic.  Patient informed to anticipate up to three hours between the fluorsoscopy  and cath flow.

## 2020-05-19 NOTE — TELEPHONE ENCOUNTER
"Patient called stating \"I have some appointments tomorrow [port check and cath charisse], and I have some questions for Judi. Could she call me back?\"  Routed to Oncology pool for nurse/provider to address.  "

## 2020-05-20 ENCOUNTER — HOSPITAL ENCOUNTER (OUTPATIENT)
Dept: GENERAL RADIOLOGY | Facility: HOSPITAL | Age: 80
End: 2020-05-20
Attending: INTERNAL MEDICINE | Admitting: RADIOLOGY
Payer: COMMERCIAL

## 2020-05-20 ENCOUNTER — HOSPITAL ENCOUNTER (OUTPATIENT)
Facility: HOSPITAL | Age: 80
Discharge: HOME | End: 2020-05-20
Attending: RADIOLOGY | Admitting: RADIOLOGY
Payer: COMMERCIAL

## 2020-05-20 PROCEDURE — 25000128 H RX IP 250 OP 636

## 2020-05-20 PROCEDURE — 25500064 ZZH RX 255 OP 636: Performed by: RADIOLOGY

## 2020-05-20 PROCEDURE — 36598 INJ W/FLUOR EVAL CV DEVICE: CPT | Mod: TC,LT

## 2020-05-20 RX ORDER — IOPAMIDOL 612 MG/ML
50 INJECTION, SOLUTION INTRAVASCULAR ONCE
Status: COMPLETED | OUTPATIENT
Start: 2020-05-20 | End: 2020-05-20

## 2020-05-20 RX ORDER — HEPARIN SODIUM (PORCINE) LOCK FLUSH IV SOLN 100 UNIT/ML 100 UNIT/ML
5 SOLUTION INTRAVENOUS
Status: DISCONTINUED | OUTPATIENT
Start: 2020-05-20 | End: 2020-05-21 | Stop reason: HOSPADM

## 2020-05-20 RX ORDER — HEPARIN SODIUM (PORCINE) LOCK FLUSH IV SOLN 100 UNIT/ML 100 UNIT/ML
SOLUTION INTRAVENOUS
Status: COMPLETED
Start: 2020-05-20 | End: 2020-05-20

## 2020-05-20 RX ORDER — HEPARIN SODIUM (PORCINE) LOCK FLUSH IV SOLN 100 UNIT/ML 100 UNIT/ML
5 SOLUTION INTRAVENOUS
Status: CANCELLED | OUTPATIENT
Start: 2020-05-20

## 2020-05-20 RX ADMIN — HEPARIN 500 UNITS: 100 SYRINGE at 11:57

## 2020-05-20 RX ADMIN — IOPAMIDOL 10 ML: 612 INJECTION, SOLUTION INTRAVENOUS at 11:51

## 2020-06-19 ENCOUNTER — INFUSION THERAPY VISIT (OUTPATIENT)
Dept: INFUSION THERAPY | Facility: OTHER | Age: 80
End: 2020-06-19
Attending: NURSE PRACTITIONER
Payer: COMMERCIAL

## 2020-06-19 DIAGNOSIS — C91.10 CHRONIC LYMPHOCYTIC LEUKEMIA (H): Primary | ICD-10-CM

## 2020-06-19 PROCEDURE — 25000128 H RX IP 250 OP 636: Performed by: NURSE PRACTITIONER

## 2020-06-19 PROCEDURE — 96523 IRRIG DRUG DELIVERY DEVICE: CPT

## 2020-06-19 RX ORDER — HEPARIN SODIUM (PORCINE) LOCK FLUSH IV SOLN 100 UNIT/ML 100 UNIT/ML
500 SOLUTION INTRAVENOUS EVERY 8 HOURS
Status: CANCELLED
Start: 2020-06-19

## 2020-06-19 RX ORDER — HEPARIN SODIUM (PORCINE) LOCK FLUSH IV SOLN 100 UNIT/ML 100 UNIT/ML
500 SOLUTION INTRAVENOUS EVERY 8 HOURS
Status: DISCONTINUED | OUTPATIENT
Start: 2020-06-19 | End: 2020-06-19 | Stop reason: HOSPADM

## 2020-06-19 RX ADMIN — HEPARIN 500 UNITS: 100 SYRINGE at 08:33

## 2020-06-19 NOTE — PROGRESS NOTES
Patient is a 79 year old female here today for port flush per order of Dr. Bach.  Skin is warm, clean, dry, and intact without redness, swelling, nor other signs of infection noted.  Port accessed via sterile technique per facility protocol with a 20 gauge, 3/4 inch non coring 90 degree bent hutchison power needle. Port flushed easily, without resistance. Immediate blood return noted.  Flushed with 10 cc 0.9% normal saline and 5 cc heparinized saline.  Needle removed intact, sterile dressing applied.  Slight pressure applied for 30 seconds.   Patient tolerated port flush well, denies pain nor discomfort at this time. Patient instructed to leave dressing intact for a minimum of one hour, and to call with questions or concerns.  Patient states understanding and is in agreement with this plan.  Patient discharged ambulatory.

## 2020-07-17 ENCOUNTER — INFUSION THERAPY VISIT (OUTPATIENT)
Dept: INFUSION THERAPY | Facility: OTHER | Age: 80
End: 2020-07-17
Attending: NURSE PRACTITIONER
Payer: COMMERCIAL

## 2020-07-17 DIAGNOSIS — C91.10 CHRONIC LYMPHOCYTIC LEUKEMIA (H): Primary | ICD-10-CM

## 2020-07-17 PROCEDURE — 25000128 H RX IP 250 OP 636: Performed by: NURSE PRACTITIONER

## 2020-07-17 PROCEDURE — 96523 IRRIG DRUG DELIVERY DEVICE: CPT

## 2020-07-17 RX ORDER — HEPARIN SODIUM (PORCINE) LOCK FLUSH IV SOLN 100 UNIT/ML 100 UNIT/ML
500 SOLUTION INTRAVENOUS EVERY 8 HOURS
Status: CANCELLED
Start: 2020-07-17

## 2020-07-17 RX ORDER — HEPARIN SODIUM (PORCINE) LOCK FLUSH IV SOLN 100 UNIT/ML 100 UNIT/ML
500 SOLUTION INTRAVENOUS EVERY 8 HOURS
Status: DISCONTINUED | OUTPATIENT
Start: 2020-07-17 | End: 2020-07-17 | Stop reason: HOSPADM

## 2020-07-17 RX ADMIN — HEPARIN 500 UNITS: 100 SYRINGE at 08:29

## 2020-07-17 NOTE — PROGRESS NOTES
Patient is a 79 year old female here today for port flush per order of Dr. Bach.  Skin is warm, clean, dry, and intact without redness, swelling, nor other signs of infection noted.  Port accessed via sterile technique per facility protocol with a 19 gauge, 3/4 inch non coring 90 degree bent hutchison needle. Port flushed easily, without resistance. Immediate blood return noted.  Flushed with 10 cc 0.9% normal saline and 5 cc heparinized saline.  Needle removed intact, sterile dressing applied.  Slight pressure applied for 30 seconds.   Patient tolerated port flush well, denies pain nor discomfort at this time. Patient instructed to leave dressing intact for a minimum of one hour, and to call with questions or concerns.  Patient states understanding and is in agreement with this plan.  Patient discharged ambulatory.

## 2020-08-09 NOTE — PROGRESS NOTES
Oncology Follow-up Visit:  August 13, 2020    Reason for Visit:  Patient presents with:  RECHECK: Follow up Chronic lymphocytic leukemia      Nursing Note and documentation reviewed: yes    HPI:   This is a 79-year-old female patient who presents to the oncology clinic today in follow up of CLL diagnosed December 2011. She completed 6 cycles of Bendamustine July 2, 2015.   She is being followed with surveillance.     She presents to the clinic today without complaints.  She denies any issues with fevers, night sweats or weight loss.  She denies any new concerning lumps.  She follows with Dr. De La Rosa as her PCP.    Oncologic History:     Jennifer was diagnosed with CLL in December 2011 after her primary care provider noted an elevated WBC on her annual exam. A peripheral blood flow for cytometry revealed she was CD5 positive, CD10 negative, consistent with CLL/SLL. Staging studies were completed and CT scan showed no evidence of lymphadenopathy or splenomegaly. She was asymptomatic with essentially stable lymphocyte count and followed with surveillance.      With patient's follow up in October 2014, hemoglobin was noted to be 10.7 with Hematocrit 30.0 and platelets 137,000. She was subsequently seen again 2 months later with further drop in hemoglobin to 8.6 with hematocrit 26.1 and platelets 140,000 with a white blood cell count of 13.5 and ANC 1.2, absolute lymphocytes were 12.1. A PET scan was completed on 12/11/2014 which did show mildly enlarged axillary lymph nodes with very mild abnormal hypermetabolism of SUV of 2 or less. Bone marrow aspiration and biopsy was completed which revealed infiltration of CLL with 90% of cells confirmed to be CLL via flow morphologically. Patient was staged at least stage III CLL based on her anemia. The plan was to proceed with chemotherapy consisting of Rituxan and Fludara. Patient did experience hypersensitivity reaction with cycle 2 Rituxan therapy experiencing hypotension. This  was discontinued and chemotherapy regime was changed to Bendamustine day 1 and day 2 every 28 days. She was started on 2/12/2015 and completed therapy in July 2015.      Current Chemo Regime/TX:  n/a  Current Cycle:  n/a  # of completed cycles:  n/a      Previous treatment: Rituxan/Fludara completing one full cycle and experienced hypersensitivity with Rituxan with second cycle;  Bendamustine 50 mg per metered squared days 1 and 2 every 28 days x6 cycles completed 7/2/15     Past Medical History:   Diagnosis Date     Arthritis     back, fingers, legs     Cancer ([facility])     chronic lymphocytic leukemia     Chronic lymphoid leukemia, 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 2/28/2012     Diverticulosis of colon (without mention of hemorrhage) 11/29/2010     Family history of ischemic heart disease 11/29/2010     Family history of malignant neoplasm of gastrointestinal tract 10/14/2002     History of blood transfusion     prior to chemo     Non-toxic multinodular goiter 11/8/2016     Osteoporosis, unspecified 11/29/2010     Other and unspecified hyperlipidemia 11/28/2011     Viral warts, unspecified 11/28/2011       Past Surgical History:   Procedure Laterality Date     ------------OTHER-------------      elbow repair     COLONOSCOPY  01/12/2010    repeat in 2015     COLONOSCOPY      SEVERAL COLONOSCOPIES - ALL BEEN NORMAL - REFUSES ANY MORE      COLONOSCOPY       INSERT PORT VASCULAR ACCESS N/A 1/5/2015    Procedure: INSERT PORT VASCULAR ACCESS;  Surgeon: Linda Oliver MD;  Location: [facility]     IR PORT CHECK LEFT  5/20/2020     ORTHOPEDIC SURGERY  1993    Left elbow repair       Family History   Problem Relation Age of Onset     Cancer Mother 97        colon cancer - cause of death     Colon Cancer Mother      Cancer Father      Colon Cancer Father      Diabetes Brother      Obesity Brother      Breast Cancer Daughter      Hyperlipidemia Daughter      Thyroid Disease Daughter      Hypertension Son       Hyperlipidemia Son      Thyroid Disease Cousin      Hyperlipidemia Son      Coronary Artery Disease No family hx of      Cerebrovascular Disease No family hx of      Prostate Cancer No family hx of      Other Cancer No family hx of      Anesthesia Reaction No family hx of      Asthma No family hx of      Genetic Disorder No family hx of        Social History     Socioeconomic History     Marital status:      Spouse name: Not on file     Number of children: Not on file     Years of education: Not on file     Highest education level: Not on file   Occupational History     Occupation: [employer]   Social Needs     Financial resource strain: Not on file     Food insecurity     Worry: Not on file     Inability: Not on file     Transportation needs     Medical: Not on file     Non-medical: Not on file   Tobacco Use     Smoking status: Never Smoker     Smokeless tobacco: Never Used   Substance and Sexual Activity     Alcohol use: No     Drug use: No     Sexual activity: Never     Comment: devorced   Lifestyle     Physical activity     Days per week: Not on file     Minutes per session: Not on file     Stress: Not on file   Relationships     Social connections     Talks on phone: Not on file     Gets together: Not on file     Attends Rastafarian service: Not on file     Active member of club or organization: Not on file     Attends meetings of clubs or organizations: Not on file     Relationship status: Not on file     Intimate partner violence     Fear of current or ex partner: Not on file     Emotionally abused: Not on file     Physically abused: Not on file     Forced sexual activity: Not on file   Other Topics Concern     Parent/sibling w/ CABG, MI or angioplasty before 65F 55M? No      Service No     Blood Transfusions Yes     Comment: permits if needed     Caffeine Concern Yes     Comment: 1 cup     Occupational Exposure No     Hobby Hazards No     Sleep Concern No     Stress Concern No     Weight  "Concern No     Special Diet No     Back Care No     Exercise No     Bike Helmet Not Asked     Seat Belt Yes     Self-Exams Not Asked   Social History Narrative     Not on file       Current Outpatient Medications   Medication     ALENDRONATE 70 MG PO tablet     Ascorbic Acid (VITAMIN C PO)     calcium gluconate 500 MG TABS     Cholecalciferol (VITAMIN D3 PO)     lidocaine-prilocaine (EMLA) cream     No current facility-administered medications for this visit.      Facility-Administered Medications Ordered in Other Visits   Medication     heparin 100 UNIT/ML injection 500 Units     sodium chloride (PF) 0.9% PF flush 10 mL        Allergies   Allergen Reactions     Sulfa Drugs Rash     Rituxan [Rituximab]      Simvastatin Other (See Comments)     Takes pravastatin at home  Zocor - myalgia       Review Of Systems:  Constitutional:    denies fever, weight changes, chills, and night sweats.  Eyes:    denies blurred or double vision  Ears/Nose/Throat:   denies ear pain, nose problems, difficulty swallowing  Respiratory:   denies shortness of breath, cough   Skin:   denies rash, lesions  Cardiovascular:   denies chest pain, palpitations, edema  Gastrointestinal:   denies abdominal pain, bloating, nausea, early satiety; no change in bowel habits or blood in stool  Genitourinary:   denies difficulty with urination, blood in urine  Musculoskeletal:    denies new muscle pain, bone pain  Neurologic:   denies lightheadedness, headaches, numbness or tingling  Psychiatric:   denies anxiety, depression  Hematologic/Lymphatic/Immunologic:   denies easy bruising, easy bleeding, lumps or bumps noted  Endocrine:   Denies increased thirst      Physical Exam:  BP (!) 147/77   Pulse 84   Temp 97.3  F (36.3  C) (Tympanic)   Ht 1.499 m (4' 11\")   Wt 46 kg (101 lb 6.6 oz)   SpO2 98%   BMI 20.48 kg/m      GENERAL APPEARANCE: Healthy, alert and in no acute distress.  HEENT: Normocephalic, Sclerae anicteric. Oropharynx without ulcers, " lesions, or thrush.  NECK:   No asymmetry or masses, no thyromegaly.  LYMPHATICS: No palpable cervical, supraclavicular, axillary, or inguinal nodes   RESP: Lungs clear to auscultation bilaterally, respirations regular and easy  CARDIOVASCULAR: Regular rate and rhythm. Normal S1, S2; no murmur, gallop, or rub.  ABDOMEN: Soft, nontender. Bowel sounds auscultated all 4 quadrants. No palpable organomegaly or masses.  MUSCULOSKELETAL: Extremities without gross deformities noted. No edema of bilateral lower extremities.  NEURO: Alert and oriented x 3.  Gait steady.  PSYCHIATRIC: Mentation and affect appear normal.  Mood appropriate.    Laboratory:  Results for orders placed or performed in visit on 08/13/20   Lactate Dehydrogenase     Status: None   Result Value Ref Range    Lactate Dehydrogenase 211 81 - 234 U/L   Comprehensive metabolic panel     Status: None   Result Value Ref Range    Sodium 138 133 - 144 mmol/L    Potassium 3.8 3.4 - 5.3 mmol/L    Chloride 105 94 - 109 mmol/L    Carbon Dioxide 28 20 - 32 mmol/L    Anion Gap 5 3 - 14 mmol/L    Glucose 97 70 - 99 mg/dL    Urea Nitrogen 14 7 - 30 mg/dL    Creatinine 0.74 0.52 - 1.04 mg/dL    GFR Estimate 78 >60 mL/min/[1.73_m2]    GFR Estimate If Black >90 >60 mL/min/[1.73_m2]    Calcium 9.4 8.5 - 10.1 mg/dL    Bilirubin Total 0.7 0.2 - 1.3 mg/dL    Albumin 4.0 3.4 - 5.0 g/dL    Protein Total 7.7 6.8 - 8.8 g/dL    Alkaline Phosphatase 69 40 - 150 U/L    ALT 29 0 - 50 U/L    AST 29 0 - 45 U/L   CBC with platelets differential     Status: None   Result Value Ref Range    WBC 8.6 4.0 - 11.0 10e9/L    RBC Count 4.76 3.8 - 5.2 10e12/L    Hemoglobin 14.1 11.7 - 15.7 g/dL    Hematocrit 41.8 35.0 - 47.0 %    MCV 88 78 - 100 fl    MCH 29.6 26.5 - 33.0 pg    MCHC 33.7 31.5 - 36.5 g/dL    RDW 13.7 10.0 - 15.0 %    Platelet Count 259 150 - 450 10e9/L    Diff Method Automated Method     % Neutrophils 71.6 %    % Lymphocytes 18.6 %    % Monocytes 7.5 %    % Eosinophils 1.3 %    %  Basophils 0.5 %    % Immature Granulocytes 0.5 %    Nucleated RBCs 0 0 /100    Absolute Neutrophil 6.2 1.6 - 8.3 10e9/L    Absolute Lymphocytes 1.6 0.8 - 5.3 10e9/L    Absolute Monocytes 0.7 0.0 - 1.3 10e9/L    Absolute Eosinophils 0.1 0.0 - 0.7 10e9/L    Absolute Basophils 0.0 0.0 - 0.2 10e9/L    Abs Immature Granulocytes 0.0 0 - 0.4 10e9/L    Absolute Nucleated RBC 0.0        Imaging Studies:  None completed for today's visit       ASSESSMENT/PLAN:    #1 CLL/SLL: Diagnosed with CLL/SLL in December 2011. Stage III. She received 6 cycles of bendamustine completing this July 2015 and is now followed with surveillance.  She is feeling good.  Labs are good with normal LDH.  She will follow up in 6 months with a CBC, CMP and LDH.     I encouraged patient to call with any questions or concerns.       Yaneli Gray NP  APRN, FNP-BC, AOCNP

## 2020-08-13 ENCOUNTER — INFUSION THERAPY VISIT (OUTPATIENT)
Dept: INFUSION THERAPY | Facility: OTHER | Age: 80
End: 2020-08-13
Attending: NURSE PRACTITIONER
Payer: COMMERCIAL

## 2020-08-13 ENCOUNTER — ONCOLOGY VISIT (OUTPATIENT)
Dept: ONCOLOGY | Facility: OTHER | Age: 80
End: 2020-08-13
Attending: NURSE PRACTITIONER
Payer: COMMERCIAL

## 2020-08-13 VITALS
HEART RATE: 84 BPM | HEIGHT: 59 IN | DIASTOLIC BLOOD PRESSURE: 77 MMHG | WEIGHT: 101.41 LBS | BODY MASS INDEX: 20.44 KG/M2 | TEMPERATURE: 97.3 F | SYSTOLIC BLOOD PRESSURE: 147 MMHG | OXYGEN SATURATION: 98 %

## 2020-08-13 DIAGNOSIS — C91.10 CHRONIC LYMPHOCYTIC LEUKEMIA (H): Primary | ICD-10-CM

## 2020-08-13 LAB
ALBUMIN SERPL-MCNC: 4 G/DL (ref 3.4–5)
ALP SERPL-CCNC: 69 U/L (ref 40–150)
ALT SERPL W P-5'-P-CCNC: 29 U/L (ref 0–50)
ANION GAP SERPL CALCULATED.3IONS-SCNC: 5 MMOL/L (ref 3–14)
AST SERPL W P-5'-P-CCNC: 29 U/L (ref 0–45)
BASOPHILS # BLD AUTO: 0 10E9/L (ref 0–0.2)
BASOPHILS NFR BLD AUTO: 0.5 %
BILIRUB SERPL-MCNC: 0.7 MG/DL (ref 0.2–1.3)
BUN SERPL-MCNC: 14 MG/DL (ref 7–30)
CALCIUM SERPL-MCNC: 9.4 MG/DL (ref 8.5–10.1)
CHLORIDE SERPL-SCNC: 105 MMOL/L (ref 94–109)
CO2 SERPL-SCNC: 28 MMOL/L (ref 20–32)
CREAT SERPL-MCNC: 0.74 MG/DL (ref 0.52–1.04)
DIFFERENTIAL METHOD BLD: NORMAL
EOSINOPHIL # BLD AUTO: 0.1 10E9/L (ref 0–0.7)
EOSINOPHIL NFR BLD AUTO: 1.3 %
ERYTHROCYTE [DISTWIDTH] IN BLOOD BY AUTOMATED COUNT: 13.7 % (ref 10–15)
GFR SERPL CREATININE-BSD FRML MDRD: 78 ML/MIN/{1.73_M2}
GLUCOSE SERPL-MCNC: 97 MG/DL (ref 70–99)
HCT VFR BLD AUTO: 41.8 % (ref 35–47)
HGB BLD-MCNC: 14.1 G/DL (ref 11.7–15.7)
IMM GRANULOCYTES # BLD: 0 10E9/L (ref 0–0.4)
IMM GRANULOCYTES NFR BLD: 0.5 %
LDH SERPL L TO P-CCNC: 211 U/L (ref 81–234)
LYMPHOCYTES # BLD AUTO: 1.6 10E9/L (ref 0.8–5.3)
LYMPHOCYTES NFR BLD AUTO: 18.6 %
MCH RBC QN AUTO: 29.6 PG (ref 26.5–33)
MCHC RBC AUTO-ENTMCNC: 33.7 G/DL (ref 31.5–36.5)
MCV RBC AUTO: 88 FL (ref 78–100)
MONOCYTES # BLD AUTO: 0.7 10E9/L (ref 0–1.3)
MONOCYTES NFR BLD AUTO: 7.5 %
NEUTROPHILS # BLD AUTO: 6.2 10E9/L (ref 1.6–8.3)
NEUTROPHILS NFR BLD AUTO: 71.6 %
NRBC # BLD AUTO: 0 10*3/UL
NRBC BLD AUTO-RTO: 0 /100
PLATELET # BLD AUTO: 259 10E9/L (ref 150–450)
POTASSIUM SERPL-SCNC: 3.8 MMOL/L (ref 3.4–5.3)
PROT SERPL-MCNC: 7.7 G/DL (ref 6.8–8.8)
RBC # BLD AUTO: 4.76 10E12/L (ref 3.8–5.2)
SODIUM SERPL-SCNC: 138 MMOL/L (ref 133–144)
WBC # BLD AUTO: 8.6 10E9/L (ref 4–11)

## 2020-08-13 PROCEDURE — 80053 COMPREHEN METABOLIC PANEL: CPT | Mod: ZL | Performed by: NURSE PRACTITIONER

## 2020-08-13 PROCEDURE — 83615 LACTATE (LD) (LDH) ENZYME: CPT | Mod: ZL | Performed by: NURSE PRACTITIONER

## 2020-08-13 PROCEDURE — G0463 HOSPITAL OUTPT CLINIC VISIT: HCPCS

## 2020-08-13 PROCEDURE — 36415 COLL VENOUS BLD VENIPUNCTURE: CPT | Mod: ZL | Performed by: NURSE PRACTITIONER

## 2020-08-13 PROCEDURE — 99213 OFFICE O/P EST LOW 20 MIN: CPT | Performed by: NURSE PRACTITIONER

## 2020-08-13 PROCEDURE — 25000128 H RX IP 250 OP 636: Performed by: NURSE PRACTITIONER

## 2020-08-13 PROCEDURE — 85025 COMPLETE CBC W/AUTO DIFF WBC: CPT | Mod: ZL | Performed by: NURSE PRACTITIONER

## 2020-08-13 RX ORDER — HEPARIN SODIUM (PORCINE) LOCK FLUSH IV SOLN 100 UNIT/ML 100 UNIT/ML
500 SOLUTION INTRAVENOUS EVERY 8 HOURS
Status: CANCELLED
Start: 2020-08-13

## 2020-08-13 RX ORDER — HEPARIN SODIUM (PORCINE) LOCK FLUSH IV SOLN 100 UNIT/ML 100 UNIT/ML
500 SOLUTION INTRAVENOUS EVERY 8 HOURS
Status: DISCONTINUED | OUTPATIENT
Start: 2020-08-13 | End: 2020-08-13 | Stop reason: HOSPADM

## 2020-08-13 RX ADMIN — HEPARIN 500 UNITS: 100 SYRINGE at 07:57

## 2020-08-13 ASSESSMENT — PAIN SCALES - GENERAL: PAINLEVEL: NO PAIN (0)

## 2020-08-13 ASSESSMENT — MIFFLIN-ST. JEOR: SCORE: 850.63

## 2020-08-13 NOTE — PATIENT INSTRUCTIONS
We would like to see you back in 6 months. Please come 30 minutes prior for lab work through your port.     If you have any questions please call [phone]    Other instructions:  none

## 2020-08-13 NOTE — PROGRESS NOTES
Patient is a 79 year old female here today accompanied by self for labs from port.    Hand hygiene performed: yes   Mask donned by caregiver: yes   Site prepped with CHG: yes   Labs drawn: yes   Dressing applied using aseptic technique: yes  Patients port accessed using non-coring, 19g 3/4 inch power needle. Port accessed per facility protocol. Port flushed easily, blood return noted.  Flushed 10 mLs normal saline, 10 mLs blood wasted, labs drawn per orders (2 tubes).  Port flushed 20 mLs Normal Saline then Heparin 5mLs of 100 unit/mL.  No signs and symptoms of infection or infiltration.  Needle removed, small dressing applied.   Discharged.

## 2020-08-13 NOTE — NURSING NOTE
"Chief Complaint   Patient presents with     RECHECK     Follow up Chronic lymphocytic leukemia        Initial BP (!) 147/77   Pulse 84   Temp 97.3  F (36.3  C) (Tympanic)   Ht 1.499 m (4' 11\")   Wt 46 kg (101 lb 6.6 oz)   SpO2 98%   BMI 20.48 kg/m   Estimated body mass index is 20.48 kg/m  as calculated from the following:    Height as of this encounter: 1.499 m (4' 11\").    Weight as of this encounter: 46 kg (101 lb 6.6 oz).  Medication Reconciliation: complete     Immunizations and advance directives status reviewed. Pain scale 0 , PHQ-2 0- just anxiety being here.          Yadira Metz LPN  "

## 2020-08-31 ENCOUNTER — TELEPHONE (OUTPATIENT)
Dept: ONCOLOGY | Facility: OTHER | Age: 80
End: 2020-08-31

## 2020-08-31 NOTE — TELEPHONE ENCOUNTER
Her labs were good with normal platelet level.  If it continues she should see her PCP.  I can put an order in for a recheck on her platelets if it happens again.

## 2020-08-31 NOTE — TELEPHONE ENCOUNTER
"Patient called, asking to speak to Judi.  She states she has a few questions for her.  Offered nurse, as Judi is not in on Mondays, and she states \"If Judi can just call me back sometime tomorrow, that will be fine.\"  Routed to Oncology pool for provider review and to return call.  "

## 2020-08-31 NOTE — TELEPHONE ENCOUNTER
Patient concerned she has 2 nose bleeds tried to find out if they were large or small couldn't describe states some blood on the kleenex but states stopped after 30 seconds of pressure. She states just had labs done and they were fine. She is wondering if she should be concernes with her leukemia. Please advise

## 2020-09-11 ENCOUNTER — INFUSION THERAPY VISIT (OUTPATIENT)
Dept: INFUSION THERAPY | Facility: OTHER | Age: 80
End: 2020-09-11
Attending: NURSE PRACTITIONER
Payer: COMMERCIAL

## 2020-09-11 DIAGNOSIS — C91.10 CHRONIC LYMPHOCYTIC LEUKEMIA (H): Primary | ICD-10-CM

## 2020-09-11 PROCEDURE — 25000128 H RX IP 250 OP 636: Performed by: NURSE PRACTITIONER

## 2020-09-11 PROCEDURE — 96523 IRRIG DRUG DELIVERY DEVICE: CPT

## 2020-09-11 RX ORDER — HEPARIN SODIUM (PORCINE) LOCK FLUSH IV SOLN 100 UNIT/ML 100 UNIT/ML
500 SOLUTION INTRAVENOUS EVERY 8 HOURS
Status: DISCONTINUED | OUTPATIENT
Start: 2020-09-11 | End: 2020-09-11 | Stop reason: HOSPADM

## 2020-09-11 RX ORDER — HEPARIN SODIUM (PORCINE) LOCK FLUSH IV SOLN 100 UNIT/ML 100 UNIT/ML
500 SOLUTION INTRAVENOUS EVERY 8 HOURS
Status: CANCELLED
Start: 2020-09-11

## 2020-09-11 RX ADMIN — HEPARIN 500 UNITS: 100 SYRINGE at 08:31

## 2020-09-11 NOTE — PATIENT INSTRUCTIONS
We will see you back as planned. If you have any questions or concerns, we can be reached Monday through Friday 8am - 430pm at [phone] [place] [place] If you have concerns related to a potential reaction/side effect after hours/weekends/holiday's, please seek emergent medical care.

## 2020-09-11 NOTE — PROGRESS NOTES
Patient is a 79 year old female here today for port flush.  Skin is warm, clean, dry, and intact without redness, swelling, nor other signs of infection noted.  Port accessed via sterile technique per facility protocol with a 19 gauge, 3/4 inch non coring 90 degree bent hutchison power needle. Port flushed easily, without resistance. Immediate blood return noted.  Flushed with 10 cc 0.9% normal saline and 5 cc heparinized saline.  Needle removed intact, sterile dressing applied.  Slight pressure applied for 30 seconds.   Patient tolerated port flush well, denies pain nor discomfort at this time. Patient instructed to leave dressing intact for a minimum of one hour, and to call with questions or concerns.  Patient states understanding and is in agreement with this plan.  Patient discharged ambulatory.  Dominga Cummings RN

## 2020-10-12 ENCOUNTER — INFUSION THERAPY VISIT (OUTPATIENT)
Dept: INFUSION THERAPY | Facility: OTHER | Age: 80
End: 2020-10-12
Attending: NURSE PRACTITIONER
Payer: COMMERCIAL

## 2020-10-12 DIAGNOSIS — Z23 NEED FOR PROPHYLACTIC VACCINATION AND INOCULATION AGAINST INFLUENZA: ICD-10-CM

## 2020-10-12 DIAGNOSIS — C91.10 CHRONIC LYMPHOCYTIC LEUKEMIA (H): Primary | ICD-10-CM

## 2020-10-12 PROCEDURE — 96523 IRRIG DRUG DELIVERY DEVICE: CPT

## 2020-10-12 PROCEDURE — G0008 ADMIN INFLUENZA VIRUS VAC: HCPCS

## 2020-10-12 PROCEDURE — 250N000011 HC RX IP 250 OP 636: Performed by: NURSE PRACTITIONER

## 2020-10-12 RX ORDER — HEPARIN SODIUM (PORCINE) LOCK FLUSH IV SOLN 100 UNIT/ML 100 UNIT/ML
500 SOLUTION INTRAVENOUS EVERY 8 HOURS
Status: CANCELLED
Start: 2020-10-12

## 2020-10-12 RX ORDER — HEPARIN SODIUM (PORCINE) LOCK FLUSH IV SOLN 100 UNIT/ML 100 UNIT/ML
500 SOLUTION INTRAVENOUS EVERY 8 HOURS
Status: DISCONTINUED | OUTPATIENT
Start: 2020-10-12 | End: 2020-10-12 | Stop reason: HOSPADM

## 2020-10-12 RX ADMIN — HEPARIN 500 UNITS: 100 SYRINGE at 08:28

## 2020-10-12 NOTE — PROGRESS NOTES
Patients port accessed using non-coring, 19 gauge, 3/4 inch power needle. Port accessed per facility protocol.   Hand hygiene performed: yes   Mask donned by caregiver: yes   Site prepped with CHG: yes   Labs drawn: no   Dressing applied using aseptic technique: yes  Patient presents to infusion requesting influenza vaccination.  TORB from Dr Bach with ok to vaccinate per request.    Vaccination given by this nurse.  Recorded by this nurse.    Port flushed easily, blood return noted.  No signs and symptoms of infection or infiltration.  Port flushed 10 mLs Normal Saline then Heparin 5mLs of 100 unit/mL.  Needle removed, small dressing applied.  Patient discharged with no complaints.

## 2020-11-11 ENCOUNTER — CARE COORDINATION (OUTPATIENT)
Dept: CASE MANAGEMENT | Facility: HOSPITAL | Age: 80
End: 2020-11-11

## 2020-11-11 NOTE — PROGRESS NOTES
Care Transitions focused note:      Called patient to inquire if she had received the letter announcing [facility] would be out of network with [facility] in 2021.    Patient had received letter and has changed over to [facility].

## 2020-11-13 ENCOUNTER — INFUSION THERAPY VISIT (OUTPATIENT)
Dept: INFUSION THERAPY | Facility: OTHER | Age: 80
End: 2020-11-13
Attending: NURSE PRACTITIONER
Payer: COMMERCIAL

## 2020-11-13 VITALS — BODY MASS INDEX: 20.39 KG/M2 | TEMPERATURE: 97.1 F | WEIGHT: 100.97 LBS

## 2020-11-13 DIAGNOSIS — C91.10 CHRONIC LYMPHOCYTIC LEUKEMIA (H): Primary | ICD-10-CM

## 2020-11-13 PROCEDURE — 250N000011 HC RX IP 250 OP 636: Performed by: NURSE PRACTITIONER

## 2020-11-13 PROCEDURE — 96523 IRRIG DRUG DELIVERY DEVICE: CPT

## 2020-11-13 RX ORDER — HEPARIN SODIUM (PORCINE) LOCK FLUSH IV SOLN 100 UNIT/ML 100 UNIT/ML
500 SOLUTION INTRAVENOUS EVERY 8 HOURS
Status: CANCELLED
Start: 2020-11-13

## 2020-11-13 RX ORDER — HEPARIN SODIUM (PORCINE) LOCK FLUSH IV SOLN 100 UNIT/ML 100 UNIT/ML
500 SOLUTION INTRAVENOUS EVERY 8 HOURS
Status: DISCONTINUED | OUTPATIENT
Start: 2020-11-13 | End: 2020-11-13 | Stop reason: HOSPADM

## 2020-11-13 RX ADMIN — HEPARIN 500 UNITS: 100 SYRINGE at 08:32

## 2020-11-13 NOTE — PROGRESS NOTES
Patient is a 80 year old female here today for port flush per order of Dr Bach.  Skin is warm, clean, dry, and intact without redness, swelling, nor other signs of infection noted.  Port accessed via sterile technique per facility protocol with a 19 gauge, 3/4 inch non coring 90 degree power needle. Port flushed easily, without resistance. Immediate blood return noted.  Flushed with 10 cc 0.9% normal saline and 5 cc heparinized saline.  Needle removed intact, sterile dressing applied.  Slight pressure applied for 30 seconds.   Patient tolerated port flush well, denies pain nor discomfort at this time. Patient instructed to leave dressing intact for a minimum of one hour, and to call with questions or concerns.  Patient states understanding and is in agreement with this plan.  Patient discharged ambulatory. RN

## 2020-11-13 NOTE — PATIENT INSTRUCTIONS
We will see you back as planned. If you have any questions or concerns, we can be reached Monday through Friday 8am - 430pm at [phone] ([facility] [place] If you have concerns related to a potential reaction/side effect after hours/weekends/holiday's, please seek emergent medical care.

## 2020-12-11 ENCOUNTER — INFUSION THERAPY VISIT (OUTPATIENT)
Dept: INFUSION THERAPY | Facility: OTHER | Age: 80
End: 2020-12-11
Attending: NURSE PRACTITIONER
Payer: COMMERCIAL

## 2020-12-11 DIAGNOSIS — C91.10 CHRONIC LYMPHOCYTIC LEUKEMIA (H): Primary | ICD-10-CM

## 2020-12-11 PROCEDURE — 96523 IRRIG DRUG DELIVERY DEVICE: CPT

## 2020-12-11 PROCEDURE — 250N000011 HC RX IP 250 OP 636: Performed by: NURSE PRACTITIONER

## 2020-12-11 RX ORDER — HEPARIN SODIUM (PORCINE) LOCK FLUSH IV SOLN 100 UNIT/ML 100 UNIT/ML
500 SOLUTION INTRAVENOUS EVERY 8 HOURS
Status: CANCELLED
Start: 2020-12-11

## 2020-12-11 RX ORDER — HEPARIN SODIUM (PORCINE) LOCK FLUSH IV SOLN 100 UNIT/ML 100 UNIT/ML
500 SOLUTION INTRAVENOUS EVERY 8 HOURS
Status: DISCONTINUED | OUTPATIENT
Start: 2020-12-11 | End: 2020-12-11 | Stop reason: HOSPADM

## 2020-12-11 RX ADMIN — HEPARIN 500 UNITS: 100 SYRINGE at 08:33

## 2020-12-11 NOTE — PROGRESS NOTES
Patient is a 80 year old female here today for port flush per order of Dr. Bach.  Skin is warm, clean, dry, and intact without redness, swelling, nor other signs of infection noted.  Port accessed via sterile technique per facility protocol with a 20 gauge, 3/4 inch non coring 90 degree bent hutchison needle. Port flushed easily, without resistance. Immediate blood return noted.  Flushed with 10 cc 0.9% normal saline and 5 cc heparinized saline.  Needle removed intact, sterile dressing applied.  Slight pressure applied for 30 seconds.   Patient tolerated port flush well, denies pain nor discomfort at this time. Patient instructed to leave dressing intact for a minimum of one hour, and to call with questions or concerns.  Patient states understanding and is in agreement with this plan.  Patient discharged ambulatory.

## 2020-12-15 NOTE — PROGRESS NOTES
Patients left sided port accessed using non-coring, 19 gauge, 3/4 inch needle. Port accessed per facility protocol.  Hand hygiene performed: yes   Mask donned by caregiver: yes Site prepped with CHG: yes Labs drawn: yes Dressing applied using aseptic technique: yes Port flushed easily, without resistance. Flushed with 10 cc's normal saline.   Immediate blood return noted. 10 cc blood discarded.  2 vials blood draw and sent to lab for results.  Port flushed with 20 cc 0.9% normal saline and 5 cc heparinized saline.  Needle removed intact, sterile dressing applied.  Slight pressure applied for 30 seconds.   Patient tolerated port flush well, denies pain nor discomfort at this time. Patient instructed to leave dressing intact for a minimum of one hour, and to call with questions or concerns.  Patient states understanding and is in agreement with this plan. Copy of appointments, and after visit summary (AVS) given to patient.  Patient discharged ambulatory. Digna Chowdary RN     Subjective:       Patient ID: Latia Posada is a 61 y.o. female.    Chief Complaint: Annual Exam    Annual exam    History of thyroid nodules, see ultrasound from 2019, no further follow-up recommended.  However, she is describing sensation of fullness and tightness in the neck since her surgery.  No difficulty swallowing.    Has a parathyroid adenoma and has seen Endocrine surgery last year.  Parathyroidectomy was completed.    Has had kidney stones.  Last seen in Urology in 2016.    Bone density in 2018, due currently.    Question of carotid vascular disease on ultrasound but this was not confirmed by a dedicated carotid ultrasound last year.    Some polycythemia which has been intermittent over the course of a couple of years off and on.  Chest x-ray last year acceptable.  Never smoked.  No apnea symptoms.    Agrees to get a flu shot.  Will consider shingles vaccine at a later point.      Patient Active Problem List:     Essential hypertension     Menopause syndrome     Thyroid nodule: stable 2014, 2017.  Also 8/18 and 2019     Mixed hyperlipidemia     Family history of colon cancer: father age 60     Vitamin D deficiency disease     Acquired cyst of kidney: stable on u/s 2017     Calculus of kidney     History of abnormal mammogram     Ganglion cyst of dorsum of left wrist     Ganglion cyst of dorsum of right wrist     Primary hyperparathyroidism      Review of Systems   Constitutional: Negative for activity change, appetite change, chills, fatigue and fever.   HENT: Negative for congestion, hearing loss, sinus pressure and sore throat.    Eyes: Negative for visual disturbance.   Respiratory: Negative for apnea, cough, shortness of breath and wheezing.    Cardiovascular: Negative for chest pain, palpitations and leg swelling.   Gastrointestinal: Negative for abdominal distention, abdominal pain, constipation, diarrhea, nausea and vomiting.   Genitourinary: Negative for dysuria, frequency, hematuria and vaginal  bleeding.   Musculoskeletal: Negative for gait problem, joint swelling and myalgias.   Skin: Negative for rash.   Neurological: Negative for dizziness, weakness, light-headedness and headaches.   Hematological: Negative for adenopathy. Does not bruise/bleed easily.   Psychiatric/Behavioral: Negative for confusion, hallucinations, sleep disturbance and suicidal ideas.       Objective:      Physical Exam  Vitals signs and nursing note reviewed.   Constitutional:       Appearance: She is well-developed.   HENT:      Head: Normocephalic and atraumatic.      Right Ear: External ear normal.      Left Ear: External ear normal.      Nose: Nose normal.      Mouth/Throat:      Pharynx: No oropharyngeal exudate.   Eyes:      General: No scleral icterus.     Extraocular Movements: Extraocular movements intact.      Conjunctiva/sclera: Conjunctivae normal.   Neck:      Musculoskeletal: Normal range of motion and neck supple.      Thyroid: Thyromegaly present.      Vascular: No JVD.      Comments: Left-sided thyroid prominent  Cardiovascular:      Rate and Rhythm: Normal rate and regular rhythm.      Heart sounds: Normal heart sounds. No murmur. No gallop.    Pulmonary:      Effort: Pulmonary effort is normal. No respiratory distress.      Breath sounds: Normal breath sounds. No wheezing.   Abdominal:      General: Bowel sounds are normal. There is no distension.      Palpations: Abdomen is soft. There is no mass.      Tenderness: There is no abdominal tenderness. There is no guarding or rebound.   Musculoskeletal: Normal range of motion.         General: No tenderness.   Lymphadenopathy:      Cervical: No cervical adenopathy.   Skin:     General: Skin is warm.      Findings: No erythema or rash.   Neurological:      General: No focal deficit present.      Mental Status: She is alert and oriented to person, place, and time.      Cranial Nerves: No cranial nerve deficit.      Coordination: Coordination normal.   Psychiatric:          Behavior: Behavior normal.         Thought Content: Thought content normal.         Judgment: Judgment normal.         Assessment:       1. Annual physical exam    2. Essential hypertension    3. Mixed hyperlipidemia    4. Acquired cyst of kidney: stable on u/s 2017    5. Primary hyperparathyroidism    6. Vitamin D deficiency disease    7. Family history of colon cancer: father age 60    8. Thyroid nodule: stable 2014, 2017.  Also 8/18 and 2019    9. Calculus of kidney    10. Polycythemia    11. Osteoporosis without current pathological fracture, unspecified osteoporosis type        Plan:           Latia was seen today for annual exam.    Diagnoses and all orders for this visit:    Annual physical exam    Essential hypertension; Low salt diet, exercise, Call if BP > 130/80 on a regular basis.    Mixed hyperlipidemia:  Continue regimen    Acquired cyst of kidney: stable on u/s 2017  -     US Retroperitoneal Complete (Kidney and; Future    Primary hyperparathyroidism:  Status post surgery    Vitamin D deficiency disease; continue regimen    Family history of colon cancer: father age 60; up-to-date with screening    Thyroid nodule: stable 2014, 2017.  Also 8/18 and 2019  -     US Soft Tissue Head Neck Thyroid; Future    Calculus of kidney; ultrasound and urine and review.  Last seen in Urology 2016, has not had follow-up recently; denies any symptoms    Polycythemia:  Unclear etiology.  Chest x-ray last year acceptable.  No evidence of sleep apnea, nonsmoker.  Workup as outlined below     Urinalysis, Reflex to Urine Culture Urine, Clean Catch  -     ERYTHROPOIETIN; Future  -     Ambulatory referral/consult to Hematology / Oncology; Future    Osteoporosis without current pathological fracture, unspecified osteoporosis type  -     DXA Bone Density Spine And Hip; Future    Other orders  -     aspirin (ECOTRIN) 81 MG EC tablet; Take 1 tablet (81 mg total) by mouth once daily.    Flu shot today  Shingles vaccine  recommended, she will consider  Mammogram scheduled for today  I will review all studies and determine further tx depending on findings

## 2021-01-11 ENCOUNTER — INFUSION THERAPY VISIT (OUTPATIENT)
Dept: INFUSION THERAPY | Facility: OTHER | Age: 81
End: 2021-01-11
Attending: NURSE PRACTITIONER
Payer: COMMERCIAL

## 2021-01-11 DIAGNOSIS — C91.10 CHRONIC LYMPHOCYTIC LEUKEMIA (H): Primary | ICD-10-CM

## 2021-01-11 PROCEDURE — 96523 IRRIG DRUG DELIVERY DEVICE: CPT

## 2021-01-11 PROCEDURE — 250N000011 HC RX IP 250 OP 636: Performed by: NURSE PRACTITIONER

## 2021-01-11 RX ORDER — HEPARIN SODIUM (PORCINE) LOCK FLUSH IV SOLN 100 UNIT/ML 100 UNIT/ML
500 SOLUTION INTRAVENOUS EVERY 8 HOURS
Status: DISCONTINUED | OUTPATIENT
Start: 2021-01-11 | End: 2021-01-11 | Stop reason: HOSPADM

## 2021-01-11 RX ORDER — HEPARIN SODIUM (PORCINE) LOCK FLUSH IV SOLN 100 UNIT/ML 100 UNIT/ML
500 SOLUTION INTRAVENOUS EVERY 8 HOURS
Status: CANCELLED
Start: 2021-01-11

## 2021-01-11 RX ADMIN — HEPARIN 500 UNITS: 100 SYRINGE at 08:30

## 2021-01-11 NOTE — PROGRESS NOTES
Patient is a 80 year old female here today for port flush per order of Dr. Bach.  Skin is warm, clean, dry, and intact without redness, swelling, nor other signs of infection noted.  Port accessed via sterile technique per facility protocol with a 19 gauge, 3/4 inch non coring 90 degree bent hutchison needle. Port flushed easily, without resistance. Immediate blood return noted.  Flushed with 10 cc 0.9% normal saline and 5 cc heparinized saline.  Needle removed intact, sterile dressing applied.  Slight pressure applied for 30 seconds.   Patient tolerated port flush well, denies pain nor discomfort at this time. Patient instructed to leave dressing intact for a minimum of one hour, and to call with questions or concerns.  Patient states understanding and is in agreement with this plan.  Patient discharged ambulatory.

## 2021-01-15 ENCOUNTER — HEALTH MAINTENANCE LETTER (OUTPATIENT)
Age: 81
End: 2021-01-15

## 2021-01-18 ENCOUNTER — OFFICE VISIT (OUTPATIENT)
Dept: FAMILY MEDICINE | Facility: OTHER | Age: 81
End: 2021-01-18
Attending: FAMILY MEDICINE
Payer: COMMERCIAL

## 2021-01-18 VITALS
DIASTOLIC BLOOD PRESSURE: 62 MMHG | WEIGHT: 99 LBS | OXYGEN SATURATION: 99 % | BODY MASS INDEX: 19.96 KG/M2 | HEIGHT: 59 IN | HEART RATE: 92 BPM | SYSTOLIC BLOOD PRESSURE: 152 MMHG | TEMPERATURE: 97.2 F

## 2021-01-18 DIAGNOSIS — R04.0 EPISTAXIS: Primary | ICD-10-CM

## 2021-01-18 DIAGNOSIS — R03.0 ELEVATED BLOOD PRESSURE READING WITHOUT DIAGNOSIS OF HYPERTENSION: ICD-10-CM

## 2021-01-18 LAB
BASOPHILS # BLD AUTO: 0 10E9/L (ref 0–0.2)
BASOPHILS NFR BLD AUTO: 0.5 %
DIFFERENTIAL METHOD BLD: NORMAL
EOSINOPHIL # BLD AUTO: 0.1 10E9/L (ref 0–0.7)
EOSINOPHIL NFR BLD AUTO: 0.7 %
ERYTHROCYTE [DISTWIDTH] IN BLOOD BY AUTOMATED COUNT: 13.5 % (ref 10–15)
HCT VFR BLD AUTO: 41.1 % (ref 35–47)
HGB BLD-MCNC: 13.9 G/DL (ref 11.7–15.7)
IMM GRANULOCYTES # BLD: 0 10E9/L (ref 0–0.4)
IMM GRANULOCYTES NFR BLD: 0.2 %
LYMPHOCYTES # BLD AUTO: 1.7 10E9/L (ref 0.8–5.3)
LYMPHOCYTES NFR BLD AUTO: 20.5 %
MCH RBC QN AUTO: 30.2 PG (ref 26.5–33)
MCHC RBC AUTO-ENTMCNC: 33.8 G/DL (ref 31.5–36.5)
MCV RBC AUTO: 89 FL (ref 78–100)
MONOCYTES # BLD AUTO: 0.6 10E9/L (ref 0–1.3)
MONOCYTES NFR BLD AUTO: 7.5 %
NEUTROPHILS # BLD AUTO: 5.8 10E9/L (ref 1.6–8.3)
NEUTROPHILS NFR BLD AUTO: 70.6 %
NRBC # BLD AUTO: 0 10*3/UL
NRBC BLD AUTO-RTO: 0 /100
PLATELET # BLD AUTO: 264 10E9/L (ref 150–450)
RBC # BLD AUTO: 4.61 10E12/L (ref 3.8–5.2)
WBC # BLD AUTO: 8.3 10E9/L (ref 4–11)

## 2021-01-18 PROCEDURE — G0463 HOSPITAL OUTPT CLINIC VISIT: HCPCS

## 2021-01-18 PROCEDURE — 36415 COLL VENOUS BLD VENIPUNCTURE: CPT | Mod: ZL | Performed by: FAMILY MEDICINE

## 2021-01-18 PROCEDURE — 85025 COMPLETE CBC W/AUTO DIFF WBC: CPT | Mod: ZL | Performed by: FAMILY MEDICINE

## 2021-01-18 PROCEDURE — 99213 OFFICE O/P EST LOW 20 MIN: CPT | Performed by: FAMILY MEDICINE

## 2021-01-18 ASSESSMENT — ANXIETY QUESTIONNAIRES
5. BEING SO RESTLESS THAT IT IS HARD TO SIT STILL: NOT AT ALL
1. FEELING NERVOUS, ANXIOUS, OR ON EDGE: NOT AT ALL
2. NOT BEING ABLE TO STOP OR CONTROL WORRYING: NOT AT ALL
4. TROUBLE RELAXING: NOT AT ALL
6. BECOMING EASILY ANNOYED OR IRRITABLE: NOT AT ALL
3. WORRYING TOO MUCH ABOUT DIFFERENT THINGS: NOT AT ALL
7. FEELING AFRAID AS IF SOMETHING AWFUL MIGHT HAPPEN: NOT AT ALL
GAD7 TOTAL SCORE: 0

## 2021-01-18 ASSESSMENT — MIFFLIN-ST. JEOR: SCORE: 834.69

## 2021-01-18 ASSESSMENT — PATIENT HEALTH QUESTIONNAIRE - PHQ9: SUM OF ALL RESPONSES TO PHQ QUESTIONS 1-9: 0

## 2021-01-18 ASSESSMENT — PAIN SCALES - GENERAL: PAINLEVEL: NO PAIN (0)

## 2021-01-18 NOTE — NURSING NOTE
"Chief Complaint   Patient presents with     Epistaxis       Initial BP (!) 160/72   Pulse 92   Temp 97.2  F (36.2  C)   Ht 1.499 m (4' 11\")   Wt 44.9 kg (99 lb)   SpO2 99%   BMI 20.00 kg/m   Estimated body mass index is 20 kg/m  as calculated from the following:    Height as of this encounter: 1.499 m (4' 11\").    Weight as of this encounter: 44.9 kg (99 lb).  Medication Reconciliation: complete  Ousmane Rich LPN  "

## 2021-01-18 NOTE — PROGRESS NOTES
"  Assessment & Plan     Epistaxis  Discussed. CBC normal. Pt to use ocean nasal spray and ointment to nares as discussed. Continue to humidify air in bedroom. Symptomatic treatment was discussed along when patient should call and/or come back into the clinic or go to ER/Urgent care. All questions answered.   Doubt mild elevated bp causing sx. Pt very anxious when come in to clinic   - CBC with platelets differential    Elevated blood pressure reading without diagnosis of hypertension  Discussed. Pt to getting check in the next month to make sure comes down. I do not feel cause of epistaxis                                No follow-ups on file.    Zheng De La Rosa MD  [facility] - TRINA Rodriguez is a 80 year old who presents to clinic today for the following health issues     HPI       Concern - nose bleeds  Onset: June  Description: has 5 bloody nose since June  Intensity: mild  Progression of Symptoms:  same  Accompanying Signs & Symptoms: last for a few minutes, drippy blood  Previous history of similar problem: leukemia  Precipitating factors:        Worsened by: nothing  Alleviating factors:        Improved by: pinching nose  Therapies tried and outcome:  none     Just right side of nose  No trauma  Has chronic rhinitis   Not gushing - more dripping  No nsaids    Pt gets real anxious when it happens  Has vaporizer in house           Review of Systems   Constitutional, HEENT, cardiovascular, pulmonary, gi and gu systems are negative, except as otherwise noted.      Objective    BP (!) 152/62   Pulse 92   Temp 97.2  F (36.2  C)   Ht 1.499 m (4' 11\")   Wt 44.9 kg (99 lb)   SpO2 99%   BMI 20.00 kg/m    There is no height or weight on file to calculate BMI.  Physical Exam   GENERAL: healthy, alert and no distress  EYES: Eyes grossly normal to inspection, PERRL and conjunctivae and sclerae normal  HENT: ear canals and TM's normal, nose and mouth without ulcers or lesions--- right " nares mild irritation over Hesselbach triangle but no area that I can see an exposed blood vessel   NECK: no adenopathy, no asymmetry, masses, or scars and thyroid normal to palpation    Results for orders placed or performed in visit on 01/18/21   CBC with platelets differential     Status: None   Result Value Ref Range    WBC 8.3 4.0 - 11.0 10e9/L    RBC Count 4.61 3.8 - 5.2 10e12/L    Hemoglobin 13.9 11.7 - 15.7 g/dL    Hematocrit 41.1 35.0 - 47.0 %    MCV 89 78 - 100 fl    MCH 30.2 26.5 - 33.0 pg    MCHC 33.8 31.5 - 36.5 g/dL    RDW 13.5 10.0 - 15.0 %    Platelet Count 264 150 - 450 10e9/L    Diff Method Automated Method     % Neutrophils 70.6 %    % Lymphocytes 20.5 %    % Monocytes 7.5 %    % Eosinophils 0.7 %    % Basophils 0.5 %    % Immature Granulocytes 0.2 %    Nucleated RBCs 0 0 /100    Absolute Neutrophil 5.8 1.6 - 8.3 10e9/L    Absolute Lymphocytes 1.7 0.8 - 5.3 10e9/L    Absolute Monocytes 0.6 0.0 - 1.3 10e9/L    Absolute Eosinophils 0.1 0.0 - 0.7 10e9/L    Absolute Basophils 0.0 0.0 - 0.2 10e9/L    Abs Immature Granulocytes 0.0 0 - 0.4 10e9/L    Absolute Nucleated RBC 0.0

## 2021-01-18 NOTE — PATIENT INSTRUCTIONS
OCEAN NASAL SPRAY 2 SPRAYS EACH NOSTRIL 3-4 DAILY    PLACE TRIPLE ANTIBOTIC  OR BACITRACIN OINTMENT IN BOTH NARES AT BEDTIME      HUMIDIFY AIR IN BEDROOM

## 2021-01-19 ASSESSMENT — ANXIETY QUESTIONNAIRES: GAD7 TOTAL SCORE: 0

## 2021-02-12 ENCOUNTER — ONCOLOGY VISIT (OUTPATIENT)
Dept: ONCOLOGY | Facility: OTHER | Age: 81
End: 2021-02-12
Attending: NURSE PRACTITIONER
Payer: COMMERCIAL

## 2021-02-12 ENCOUNTER — INFUSION THERAPY VISIT (OUTPATIENT)
Dept: INFUSION THERAPY | Facility: OTHER | Age: 81
End: 2021-02-12
Attending: NURSE PRACTITIONER
Payer: COMMERCIAL

## 2021-02-12 VITALS
DIASTOLIC BLOOD PRESSURE: 68 MMHG | TEMPERATURE: 97.8 F | HEIGHT: 59 IN | BODY MASS INDEX: 19.96 KG/M2 | RESPIRATION RATE: 20 BRPM | SYSTOLIC BLOOD PRESSURE: 140 MMHG | OXYGEN SATURATION: 98 % | WEIGHT: 98.99 LBS | HEART RATE: 84 BPM

## 2021-02-12 DIAGNOSIS — C91.10 CHRONIC LYMPHOCYTIC LEUKEMIA (H): Primary | ICD-10-CM

## 2021-02-12 LAB
ALBUMIN SERPL-MCNC: 3.9 G/DL (ref 3.4–5)
ALP SERPL-CCNC: 82 U/L (ref 40–150)
ALT SERPL W P-5'-P-CCNC: 26 U/L (ref 0–50)
ANION GAP SERPL CALCULATED.3IONS-SCNC: 5 MMOL/L (ref 3–14)
AST SERPL W P-5'-P-CCNC: 27 U/L (ref 0–45)
BASOPHILS # BLD AUTO: 0 10E9/L (ref 0–0.2)
BASOPHILS NFR BLD AUTO: 0.4 %
BILIRUB SERPL-MCNC: 0.6 MG/DL (ref 0.2–1.3)
BUN SERPL-MCNC: 15 MG/DL (ref 7–30)
CALCIUM SERPL-MCNC: 9.3 MG/DL (ref 8.5–10.1)
CHLORIDE SERPL-SCNC: 104 MMOL/L (ref 94–109)
CO2 SERPL-SCNC: 28 MMOL/L (ref 20–32)
CREAT SERPL-MCNC: 0.82 MG/DL (ref 0.52–1.04)
DIFFERENTIAL METHOD BLD: NORMAL
EOSINOPHIL # BLD AUTO: 0.1 10E9/L (ref 0–0.7)
EOSINOPHIL NFR BLD AUTO: 0.7 %
ERYTHROCYTE [DISTWIDTH] IN BLOOD BY AUTOMATED COUNT: 13.7 % (ref 10–15)
GFR SERPL CREATININE-BSD FRML MDRD: 68 ML/MIN/{1.73_M2}
GLUCOSE SERPL-MCNC: 102 MG/DL (ref 70–99)
HCT VFR BLD AUTO: 40.6 % (ref 35–47)
HGB BLD-MCNC: 13.8 G/DL (ref 11.7–15.7)
IMM GRANULOCYTES # BLD: 0 10E9/L (ref 0–0.4)
IMM GRANULOCYTES NFR BLD: 0.1 %
LDH SERPL L TO P-CCNC: 204 U/L (ref 81–234)
LYMPHOCYTES # BLD AUTO: 1.2 10E9/L (ref 0.8–5.3)
LYMPHOCYTES NFR BLD AUTO: 15.2 %
MCH RBC QN AUTO: 30.3 PG (ref 26.5–33)
MCHC RBC AUTO-ENTMCNC: 34 G/DL (ref 31.5–36.5)
MCV RBC AUTO: 89 FL (ref 78–100)
MONOCYTES # BLD AUTO: 0.5 10E9/L (ref 0–1.3)
MONOCYTES NFR BLD AUTO: 7 %
NEUTROPHILS # BLD AUTO: 5.8 10E9/L (ref 1.6–8.3)
NEUTROPHILS NFR BLD AUTO: 76.6 %
NRBC # BLD AUTO: 0 10*3/UL
NRBC BLD AUTO-RTO: 0 /100
PLATELET # BLD AUTO: 253 10E9/L (ref 150–450)
POTASSIUM SERPL-SCNC: 3.7 MMOL/L (ref 3.4–5.3)
PROT SERPL-MCNC: 7.2 G/DL (ref 6.8–8.8)
RBC # BLD AUTO: 4.56 10E12/L (ref 3.8–5.2)
SODIUM SERPL-SCNC: 137 MMOL/L (ref 133–144)
WBC # BLD AUTO: 7.6 10E9/L (ref 4–11)

## 2021-02-12 PROCEDURE — 36415 COLL VENOUS BLD VENIPUNCTURE: CPT | Mod: ZL | Performed by: NURSE PRACTITIONER

## 2021-02-12 PROCEDURE — 250N000011 HC RX IP 250 OP 636: Performed by: NURSE PRACTITIONER

## 2021-02-12 PROCEDURE — G0463 HOSPITAL OUTPT CLINIC VISIT: HCPCS

## 2021-02-12 PROCEDURE — 83615 LACTATE (LD) (LDH) ENZYME: CPT | Mod: ZL | Performed by: NURSE PRACTITIONER

## 2021-02-12 PROCEDURE — 80053 COMPREHEN METABOLIC PANEL: CPT | Mod: ZL | Performed by: NURSE PRACTITIONER

## 2021-02-12 PROCEDURE — 99213 OFFICE O/P EST LOW 20 MIN: CPT | Performed by: NURSE PRACTITIONER

## 2021-02-12 PROCEDURE — 85025 COMPLETE CBC W/AUTO DIFF WBC: CPT | Mod: ZL | Performed by: NURSE PRACTITIONER

## 2021-02-12 PROCEDURE — 96523 IRRIG DRUG DELIVERY DEVICE: CPT

## 2021-02-12 RX ORDER — HEPARIN SODIUM (PORCINE) LOCK FLUSH IV SOLN 100 UNIT/ML 100 UNIT/ML
500 SOLUTION INTRAVENOUS EVERY 8 HOURS
Status: CANCELLED
Start: 2021-02-12

## 2021-02-12 RX ORDER — HEPARIN SODIUM (PORCINE) LOCK FLUSH IV SOLN 100 UNIT/ML 100 UNIT/ML
500 SOLUTION INTRAVENOUS EVERY 8 HOURS
Status: DISCONTINUED | OUTPATIENT
Start: 2021-02-12 | End: 2021-02-12 | Stop reason: HOSPADM

## 2021-02-12 RX ADMIN — HEPARIN 500 UNITS: 100 SYRINGE at 08:29

## 2021-02-12 ASSESSMENT — PAIN SCALES - GENERAL: PAINLEVEL: NO PAIN (0)

## 2021-02-12 ASSESSMENT — MIFFLIN-ST. JEOR: SCORE: 834.63

## 2021-02-12 NOTE — NURSING NOTE
"Chief Complaint   Patient presents with     RECHECK     Follow up Chronic lymphocytic leukemia       Initial BP (!) 140/68   Pulse 84   Temp 97.8  F (36.6  C) (Tympanic)   Resp 20   Ht 1.499 m (4' 11\")   Wt 44.9 kg (98 lb 15.8 oz)   SpO2 98%   BMI 19.99 kg/m   Estimated body mass index is 19.99 kg/m  as calculated from the following:    Height as of this encounter: 1.499 m (4' 11\").    Weight as of this encounter: 44.9 kg (98 lb 15.8 oz).  Medication Reconciliation: complete.  Immunizations and advance directives status reviewed. Pain scale =0 , PHQ-2=0.            Ana Tate LPN    "

## 2021-02-12 NOTE — PROGRESS NOTES
Oncology Follow-up Visit:  February 12, 2021    Reason for Visit:  Patient presents with:  RECHECK: Follow up Chronic lymphocytic leukemia     Nursing Note and documentation reviewed: yes    HPI:  This is a 80-year-old female patient who presents to the oncology clinic today in follow up of CLL diagnosed December 2011. She completed 6 cycles of Bendamustine July 2, 2015.   She is being followed with surveillance.     She presents to the clinic today stating she is doing well.  She does continue to complain of bloody noses on occasion stating these have been happening for just over 6 months since she received her shingles vaccine.  She states that might happen 1 time a month and then not happen the next month.  She describes it as feeling as though her nose is running and its blood.  She does not soak the Kleenex's.  She has followed up with her PCP in regards to this and declines any referral to ENT.  She has no other evidence of bleeding including bruising, blood in her stool or urine.    She denies any issues with fevers, night sweats and has had some minor weight loss over the past year.  She states her appetite is good.  She denies any recent infections.    Oncologic History:     Jennifer was diagnosed with CLL in December 2011 after her primary care provider noted an elevated WBC on her annual exam. A peripheral blood flow for cytometry revealed she was CD5 positive, CD10 negative, consistent with CLL/SLL. Staging studies were completed and CT scan showed no evidence of lymphadenopathy or splenomegaly. She was asymptomatic with essentially stable lymphocyte count and followed with surveillance.      With patient's follow up in October 2014, hemoglobin was noted to be 10.7 with Hematocrit 30.0 and platelets 137,000. She was subsequently seen again 2 months later with further drop in hemoglobin to 8.6 with hematocrit 26.1 and platelets 140,000 with a white blood cell count of 13.5 and ANC 1.2, absolute lymphocytes  were 12.1. A PET scan was completed on 12/11/2014 which did show mildly enlarged axillary lymph nodes with very mild abnormal hypermetabolism of SUV of 2 or less. Bone marrow aspiration and biopsy was completed which revealed infiltration of CLL with 90% of cells confirmed to be CLL via flow morphologically. Patient was staged at least stage III CLL based on her anemia. The plan was to proceed with chemotherapy consisting of Rituxan and Fludara. Patient did experience hypersensitivity reaction with cycle 2 Rituxan therapy experiencing hypotension. This was discontinued and chemotherapy regime was changed to Bendamustine day 1 and day 2 every 28 days. She was started on 2/12/2015 and completed therapy in July 2015.      Current Chemo Regime/TX:  n/a  Current Cycle:  n/a  # of completed cycles:  n/a      Previous treatment: Rituxan/Fludara completing one full cycle and experienced hypersensitivity with Rituxan with second cycle;  Bendamustine 50 mg per metered squared days 1 and 2 every 28 days x6 cycles completed 7/2/15      Past Medical History:   Diagnosis Date     Arthritis     back, fingers, legs     Cancer ([facility])     chronic lymphocytic leukemia     Chronic lymphoid leukemia, 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 2/28/2012     Diverticulosis of colon (without mention of hemorrhage) 11/29/2010     Family history of ischemic heart disease 11/29/2010     Family history of malignant neoplasm of gastrointestinal tract 10/14/2002     History of blood transfusion     prior to chemo     Non-toxic multinodular goiter 11/8/2016     Osteoporosis, unspecified 11/29/2010     Other and unspecified hyperlipidemia 11/28/2011     Viral warts, unspecified 11/28/2011       Past Surgical History:   Procedure Laterality Date     ------------OTHER-------------      elbow repair     COLONOSCOPY  01/12/2010    repeat in 2015     COLONOSCOPY      SEVERAL COLONOSCOPIES - ALL BEEN NORMAL - REFUSES ANY MORE      COLONOSCOPY        INSERT PORT VASCULAR ACCESS N/A 1/5/2015    Procedure: INSERT PORT VASCULAR ACCESS;  Surgeon: Linda Oliver MD;  Location: [facility]     IR PORT CHECK LEFT  5/20/2020     ORTHOPEDIC SURGERY  1993    Left elbow repair       Family History   Problem Relation Age of Onset     Cancer Mother 97        colon cancer - cause of death     Colon Cancer Mother      Cancer Father      Colon Cancer Father      Diabetes Brother      Obesity Brother      Breast Cancer Daughter      Hyperlipidemia Daughter      Thyroid Disease Daughter      Hypertension Son      Hyperlipidemia Son      Thyroid Disease Cousin      Hyperlipidemia Son      Coronary Artery Disease No family hx of      Cerebrovascular Disease No family hx of      Prostate Cancer No family hx of      Other Cancer No family hx of      Anesthesia Reaction No family hx of      Asthma No family hx of      Genetic Disorder No family hx of        Social History     Socioeconomic History     Marital status:      Spouse name: Not on file     Number of children: Not on file     Years of education: Not on file     Highest education level: Not on file   Occupational History     Occupation: [employer]   Social Needs     Financial resource strain: Not on file     Food insecurity     Worry: Not on file     Inability: Not on file     Transportation needs     Medical: Not on file     Non-medical: Not on file   Tobacco Use     Smoking status: Never Smoker     Smokeless tobacco: Never Used   Substance and Sexual Activity     Alcohol use: No     Drug use: No     Sexual activity: Never     Comment: devorced   Lifestyle     Physical activity     Days per week: Not on file     Minutes per session: Not on file     Stress: Not on file   Relationships     Social connections     Talks on phone: Not on file     Gets together: Not on file     Attends [employer] service: Not on file     Active member of club or organization: Not on file     Attends meetings of clubs or organizations:  Not on file     Relationship status: Not on file     Intimate partner violence     Fear of current or ex partner: Not on file     Emotionally abused: Not on file     Physically abused: Not on file     Forced sexual activity: Not on file   Other Topics Concern     Parent/sibling w/ CABG, MI or angioplasty before 65F 55M? No      Service No     Blood Transfusions Yes     Comment: permits if needed     Caffeine Concern Yes     Comment: 1 cup     Occupational Exposure No     Hobby Hazards No     Sleep Concern No     Stress Concern No     Weight Concern No     Special Diet No     Back Care No     Exercise No     Bike Helmet Not Asked     Seat Belt Yes     Self-Exams Not Asked   Social History Narrative     Not on file       Current Outpatient Medications   Medication     ALENDRONATE 70 MG PO tablet     Ascorbic Acid (VITAMIN C PO)     Cholecalciferol (VITAMIN D3 PO)     lidocaine-prilocaine (EMLA) cream     Multiple Vitamins-Minerals (CENTRUM SILVER 50+WOMEN PO)     No current facility-administered medications for this visit.      Facility-Administered Medications Ordered in Other Visits   Medication     heparin 100 UNIT/ML injection 500 Units     sodium chloride (PF) 0.9% PF flush 10 mL        Allergies   Allergen Reactions     Sulfa Drugs Rash     Rituxan [Rituximab]      Simvastatin Other (See Comments)     Takes pravastatin at home  Zocor - myalgia       Review Of Systems:  Constitutional:    denies fever, weight changes, chills, and night sweats.  Eyes:    denies blurred or double vision  Ears/Nose/Throat:   denies ear pain, difficulty swallowing-see HPI  Respiratory:   denies shortness of breath, cough  Skin:   denies rash, lesions  Cardiovascular:   denies chest pain, palpitations, edema  Gastrointestinal:   denies abdominal pain, bloating, nausea, early satiety; no change in bowel habits or blood in stool  Genitourinary:   denies difficulty with urination, blood in urine  Musculoskeletal:    denies new  "muscle pain, bone pain  Neurologic:   denies lightheadedness, headaches, numbness or tingling  Psychiatric:   denies anxiety, depression  Hematologic/Lymphatic/Immunologic:   denies easy bruising, lumps or bumps noted  Endocrine:   Denies increased thirst      Physical Exam:  BP (!) 140/68   Pulse 84   Temp 97.8  F (36.6  C) (Tympanic)   Resp 20   Ht 1.499 m (4' 11\")   Wt 44.9 kg (98 lb 15.8 oz)   SpO2 98%   BMI 19.99 kg/m      GENERAL APPEARANCE: alert and in no acute distress.  HEENT: Normocephalic, Sclerae anicteric.   NECK:   No asymmetry or masses, no thyromegaly.  LYMPHATICS: No palpable cervical, supraclavicular, axillary, or inguinal nodes   RESP: Lungs clear to auscultation bilaterally, respirations regular and easy  CARDIOVASCULAR: Regular rate and rhythm. Normal S1, S2; no murmur, gallop, or rub.  ABDOMEN: Soft, nontender. Bowel sounds auscultated all 4 quadrants. No palpable organomegaly or masses.  MUSCULOSKELETAL: Extremities without gross deformities noted. No edema of bilateral lower extremities.  NEURO: Alert and oriented x 3.  Gait steady.  PSYCHIATRIC: Mentation and affect appear normal.  Mood appropriate.    Laboratory:  Results for orders placed or performed in visit on 02/12/21   CBC with platelets differential     Status: None   Result Value Ref Range    WBC 7.6 4.0 - 11.0 10e9/L    RBC Count 4.56 3.8 - 5.2 10e12/L    Hemoglobin 13.8 11.7 - 15.7 g/dL    Hematocrit 40.6 35.0 - 47.0 %    MCV 89 78 - 100 fl    MCH 30.3 26.5 - 33.0 pg    MCHC 34.0 31.5 - 36.5 g/dL    RDW 13.7 10.0 - 15.0 %    Platelet Count 253 150 - 450 10e9/L    Diff Method Automated Method     % Neutrophils 76.6 %    % Lymphocytes 15.2 %    % Monocytes 7.0 %    % Eosinophils 0.7 %    % Basophils 0.4 %    % Immature Granulocytes 0.1 %    Nucleated RBCs 0 0 /100    Absolute Neutrophil 5.8 1.6 - 8.3 10e9/L    Absolute Lymphocytes 1.2 0.8 - 5.3 10e9/L    Absolute Monocytes 0.5 0.0 - 1.3 10e9/L    Absolute Eosinophils 0.1 0.0 " - 0.7 10e9/L    Absolute Basophils 0.0 0.0 - 0.2 10e9/L    Abs Immature Granulocytes 0.0 0 - 0.4 10e9/L    Absolute Nucleated RBC 0.0    Comprehensive metabolic panel     Status: Abnormal   Result Value Ref Range    Sodium 137 133 - 144 mmol/L    Potassium 3.7 3.4 - 5.3 mmol/L    Chloride 104 94 - 109 mmol/L    Carbon Dioxide 28 20 - 32 mmol/L    Anion Gap 5 3 - 14 mmol/L    Glucose 102 (H) 70 - 99 mg/dL    Urea Nitrogen 15 7 - 30 mg/dL    Creatinine 0.82 0.52 - 1.04 mg/dL    GFR Estimate 68 >60 mL/min/[1.73_m2]    GFR Estimate If Black 79 >60 mL/min/[1.73_m2]    Calcium 9.3 8.5 - 10.1 mg/dL    Bilirubin Total 0.6 0.2 - 1.3 mg/dL    Albumin 3.9 3.4 - 5.0 g/dL    Protein Total 7.2 6.8 - 8.8 g/dL    Alkaline Phosphatase 82 40 - 150 U/L    ALT 26 0 - 50 U/L    AST 27 0 - 45 U/L   Lactate Dehydrogenase     Status: None   Result Value Ref Range    Lactate Dehydrogenase 204 81 - 234 U/L       Imaging Studies:  None completed for today's visit       ASSESSMENT/PLAN:    #1 CLL/SLL: Diagnosed with CLL/SLL in December 2011. Stage III. She received 6 cycles of bendamustine completing this July 2015 and is now followed with surveillance.  She is feeling good.  Labs are good with normal LDH.  She will follow up in 6 months with a CBC, CMP and LDH.     I encouraged patient to call with any questions or concerns.     Yaneli Gray, NP  APRN, FNP-BC, AOCNP

## 2021-02-12 NOTE — PROGRESS NOTES
Patients left port accessed using non-coring, 19 gauge, 3/4 power needle. Port accessed per facility protocol.  Hand hygiene performed: yes   Mask donned by caregiver: yes Site prepped with CHG: yes Labs drawn: yes Dressing applied using aseptic technique: yes Port flushed easily, without resistance. Flushed with 10 cc's normal saline.   Immediate blood return noted. 10 cc blood discarded.  2 vials blood draw and sent to lab for results.  Port flushed with 20 cc 0.9% normal saline and 5 cc heparinized saline.  Needle removed intact, sterile dressing applied.  Slight pressure applied for 30 seconds.   Patient tolerated port flush well, denies pain nor discomfort at this time. Patient instructed to leave dressing intact for a minimum of one hour, and to call with questions or concerns.  Patient states understanding and is in agreement with this plan. Patient discharged ambulatory.

## 2021-02-12 NOTE — PATIENT INSTRUCTIONS
We will schedule you for port flushes every 6 weeks.    We would like to see you back in 6 months. Please come 30 minutes prior for lab work.     If you have any questions please call [phone]    Other instructions:  none

## 2021-02-12 NOTE — PATIENT INSTRUCTIONS
We will see you back as planned. If you have any questions or concerns, we can be reached Monday through Friday 8am - 430pm at [phone] [place] [place] If you have concerns related to a potential reaction/side effect after hours/weekends/holiday's, please seek emergent medical care.

## 2021-02-23 DIAGNOSIS — M81.0 OSTEOPOROSIS, UNSPECIFIED OSTEOPOROSIS TYPE, UNSPECIFIED PATHOLOGICAL FRACTURE PRESENCE: ICD-10-CM

## 2021-02-23 RX ORDER — ALENDRONATE SODIUM 70 MG/1
TABLET ORAL
Qty: 12 TABLET | Refills: 3 | Status: SHIPPED | OUTPATIENT
Start: 2021-02-23 | End: 2022-02-23

## 2021-02-23 NOTE — TELEPHONE ENCOUNTER
fosomax      Last Written Prescription Date:  2/21/20  Last Fill Quantity: 12,   # refills: 3  Last Office Visit: 1/18/21  Future Office visit:

## 2021-03-18 ENCOUNTER — TELEPHONE (OUTPATIENT)
Dept: FAMILY MEDICINE | Facility: OTHER | Age: 81
End: 2021-03-18

## 2021-03-18 NOTE — TELEPHONE ENCOUNTER
Should be fine for her to have covid shot. Tell the nurse who is vacicnating her and stay little longer after shot - she should be fine

## 2021-03-18 NOTE — TELEPHONE ENCOUNTER
Pt called reports she has questions regarding COVID Vaccine and if it's recommended by her docotor. Pt concerned due to her sulfa allergy. Pt also reports having a severe allergic reaction from her first chemo tx reports she had to be hospitalized for this about 5-6 years ago.     Call back number: [phone]. Ok to leave detailed VM if pt doesn't answer or respond via my chart.

## 2021-03-26 ENCOUNTER — INFUSION THERAPY VISIT (OUTPATIENT)
Dept: INFUSION THERAPY | Facility: OTHER | Age: 81
End: 2021-03-26
Attending: NURSE PRACTITIONER
Payer: COMMERCIAL

## 2021-03-26 DIAGNOSIS — C91.10 CHRONIC LYMPHOCYTIC LEUKEMIA (H): Primary | ICD-10-CM

## 2021-03-26 PROCEDURE — 96523 IRRIG DRUG DELIVERY DEVICE: CPT

## 2021-03-26 PROCEDURE — 250N000011 HC RX IP 250 OP 636: Performed by: NURSE PRACTITIONER

## 2021-03-26 RX ORDER — HEPARIN SODIUM (PORCINE) LOCK FLUSH IV SOLN 100 UNIT/ML 100 UNIT/ML
500 SOLUTION INTRAVENOUS EVERY 8 HOURS
Status: DISCONTINUED | OUTPATIENT
Start: 2021-03-26 | End: 2021-03-26 | Stop reason: HOSPADM

## 2021-03-26 RX ORDER — HEPARIN SODIUM (PORCINE) LOCK FLUSH IV SOLN 100 UNIT/ML 100 UNIT/ML
500 SOLUTION INTRAVENOUS EVERY 8 HOURS
Status: CANCELLED
Start: 2021-03-26

## 2021-03-26 RX ADMIN — HEPARIN 500 UNITS: 100 SYRINGE at 08:36

## 2021-03-29 NOTE — PATIENT INSTRUCTIONS
Preventive Health Recommendations    See your health care provider every year to    Review health changes.     Discuss preventive care.      Review your medicines if your doctor has prescribed any.      You no longer need a yearly Pap test unless you've had an abnormal Pap test in the past 10 years. If you have vaginal symptoms, such as bleeding or discharge, be sure to talk with your provider about a Pap test.      Every 1 to 2 years, have a mammogram.  If you are over 69, talk with your health care provider about whether or not you want to continue having screening mammograms.      Every 10 years, have a colonoscopy. Or, have a yearly FIT test (stool test). These exams will check for colon cancer.       Have a cholesterol test every 5 years, or more often if your doctor advises it.       Have a diabetes test (fasting glucose) every three years. If you are at risk for diabetes, you should have this test more often.       At age 65, have a bone density scan (DEXA) to check for osteoporosis (brittle bone disease).    Shots:    Get a flu shot each year.    Get a tetanus shot every 10 years.    Talk to your doctor about your pneumonia vaccines. There are now two you should receive - Pneumovax (PPSV 23) and Prevnar (PCV 13).    Talk to your pharmacist about the shingles vaccine.    Talk to your doctor about the hepatitis B vaccine.    Nutrition:     Eat at least 5 servings of fruits and vegetables each day.      Eat whole-grain bread, whole-wheat pasta and brown rice instead of white grains and rice.      Get adequate about Calcium and Vitamin D.     Lifestyle    Exercise at least 150 minutes a week (30 minutes a day, 5 days a week). This will help you control your weight and prevent disease.      Limit alcohol to one drink per day.      No smoking.       Wear sunscreen to prevent skin cancer.       See your dentist twice a year for an exam and cleaning.      See your eye doctor every 1 to 2 years to screen for  conditions such as glaucoma, macular degeneration, cataracts, etc.    Personalized Prevention Plan  You are due for the preventive services outlined below.  Your care team is available to assist you in scheduling these services.  If you have already completed any of these items, please share that information with your care team to update in your medical record.    Health Maintenance Due   Topic Date Due     COVID-19 Vaccine (1) Never done     Hepatitis C Screening  Never done     Colorectal Cancer Screening  01/12/2015     Pneumococcal Vaccine (4 of 4) 11/08/2017     Pneumococcal Vaccine (4 of 4) 11/08/2017     Annual Wellness Visit  11/29/2020

## 2021-03-29 NOTE — PROGRESS NOTES
SUBJECTIVE:   CC: Jennifer Barajas is an 80 year old woman who presents for preventive health visit.       Patient has been advised of split billing requirements and indicates understanding: Yes  Healthy Habits:    Do you get at least three servings of calcium containing foods daily (dairy, green leafy vegetables, etc.)? no, taking calcium and/or vitamin D supplement: yes - both    Amount of exercise or daily activities, outside of work: no    Problems taking medications regularly No    Medication side effects: No    Have you had an eye exam in the past two years? no    Do you see a dentist twice per year? no    Do you have sleep apnea, excessive snoring or daytime drowsiness?no    Overall doing well  Has CLL and is monitored closely   Has questions on covid shot     Hyperlipidemia Follow-Up      Are you regularly taking any medication or supplement to lower your cholesterol?   No    Are you having muscle aches or other side effects that you think could be caused by your cholesterol lowering medication?  No      Today's PHQ-2 Score:   PHQ-2 ( 1999 Pfizer) 2/12/2021 8/13/2020   Q1: Little interest or pleasure in doing things 0 0   Q2: Feeling down, depressed or hopeless 0 0   PHQ-2 Score 0 0       Abuse: Current or Past(Physical, Sexual or Emotional)- No  Do you feel safe in your environment? Yes        Social History     Tobacco Use     Smoking status: Never Smoker     Smokeless tobacco: Never Used   Substance Use Topics     Alcohol use: No     If you drink alcohol do you typically have >3 drinks per day or >7 drinks per week? No                     Reviewed orders with patient.  Reviewed health maintenance and updated orders accordingly - Yes  Labs reviewed in EPIC    Mammogram Screening - Patient over age 75, has elected to continue with screening.  Pertinent mammograms are reviewed under the imaging tab.    Pertinent mammograms are reviewed under the imaging tab.  History of abnormal Pap smear: NO - age 65 - see  link Cervical Cytology Screening Guidelines     Reviewed and updated as needed this visit by clinical staff                 Reviewed and updated as needed this visit by Provider                Past Medical History:   Diagnosis Date     Arthritis     back, fingers, legs     Cancer ([facility])     chronic lymphocytic leukemia     Chronic lymphoid leukemia, 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 2/28/2012     Diverticulosis of colon (without mention of hemorrhage) 11/29/2010     Family history of ischemic heart disease 11/29/2010     Family history of malignant neoplasm of gastrointestinal tract 10/14/2002     History of blood transfusion     prior to chemo     Non-toxic multinodular goiter 11/8/2016     Osteoporosis, unspecified 11/29/2010     Other and unspecified hyperlipidemia 11/28/2011     Viral warts, unspecified 11/28/2011      Past Surgical History:   Procedure Laterality Date     ------------OTHER-------------      elbow repair     COLONOSCOPY  01/12/2010    repeat in 2015     COLONOSCOPY      SEVERAL COLONOSCOPIES - ALL BEEN NORMAL - REFUSES ANY MORE      COLONOSCOPY       INSERT PORT VASCULAR ACCESS N/A 1/5/2015    Procedure: INSERT PORT VASCULAR ACCESS;  Surgeon: Linda Oliver MD;  Location: [facility]     IR PORT CHECK LEFT  5/20/2020     ORTHOPEDIC SURGERY  1993    Left elbow repair       ROS:  CONSTITUTIONAL: NEGATIVE for fever, chills, change in weight  INTEGUMENTARY/SKIN: NEGATIVE for worrisome rashes, moles or lesions  EYES: NEGATIVE for vision changes or irritation  ENT: NEGATIVE for ear, mouth and throat problems  RESP: NEGATIVE for significant cough or SOB  BREAST: NEGATIVE for masses, tenderness or discharge  CV: NEGATIVE for chest pain, palpitations or peripheral edema  GI: NEGATIVE for nausea, abdominal pain, heartburn, or change in bowel habits  : NEGATIVE for unusual urinary or vaginal symptoms. No vaginal bleeding.  MUSCULOSKELETAL: NEGATIVE for significant arthralgias or  "myalgia  NEURO: NEGATIVE for weakness, dizziness or paresthesias  PSYCHIATRIC: NEGATIVE for changes in mood or affect     OBJECTIVE:   /60   Pulse 87   Temp 96.4  F (35.8  C)   Ht 1.48 m (4' 10.27\")   Wt 44.9 kg (99 lb)   SpO2 98%   BMI 20.50 kg/m    EXAM:  GENERAL: healthy, alert and no distress  EYES: Eyes grossly normal to inspection, PERRL and conjunctivae and sclerae normal  HENT: ear canals and TM's normal, nose and mouth without ulcers or lesions  NECK: no adenopathy, no asymmetry, masses, or scars and thyroid normal to palpation  RESP: lungs clear to auscultation - no rales, rhonchi or wheezes  BREAST: normal without masses, tenderness or nipple discharge and no palpable axillary masses or adenopathy  CV: regular rate and rhythm, normal S1 S2, no S3 or S4, no murmur, click or rub, no peripheral edema and peripheral pulses strong  ABDOMEN: soft, nontender, no hepatosplenomegaly, no masses and bowel sounds normal  MS: no gross musculoskeletal defects noted, no edema  SKIN: no suspicious lesions or rashes  NEURO: Normal strength and tone, mentation intact and speech normal  PSYCH: mentation appears normal, affect normal/bright  LYMPH: no cervical, supraclavicular, axillary, or inguinal adenopathy    Results for orders placed or performed in visit on 04/08/21   TSH     Status: None   Result Value Ref Range    TSH 4.00 0.40 - 4.00 mU/L   Lipid Profile     Status: Abnormal   Result Value Ref Range    Cholesterol 286 (H) <200 mg/dL    Triglycerides 229 (H) <150 mg/dL    HDL Cholesterol 73 >49 mg/dL    LDL Cholesterol Calculated 167 (H) <100 mg/dL    Non HDL Cholesterol 213 (H) <130 mg/dL         ASSESSMENT/PLAN:       ICD-10-CM    1. Mixed hyperlipidemia  E78.2 Lipid Profile     TSH     TSH     Lipid Profile   2. Encounter for screening mammogram for breast cancer  Z12.31 MA Screening Digital Bilateral   3. Age-related osteoporosis without current pathological fracture  M81.0 TSH     TSH   4. Chronic " "lymphocytic leukemia ([facility])  C91.10    5. Immunization counseling  Z71.89      Overall doing well. Pt did not like statins in past - side effects. Low risk for CAD/CVA.  Pt to work on diet.  Discussed covid shot-- pt has agreed to [facility] - appt made for tomorrow. Will continue Fosamax and recheck DEXA next year. Continue current medications and behavioral changes.   patient has been advised of split billing requirements and indicates understanding:   COUNSELING:   Reviewed preventive health counseling, as reflected in patient instructions       Regular exercise       Healthy diet/nutrition    Estimated body mass index is 19.99 kg/m  as calculated from the following:    Height as of 2/12/21: 1.499 m (4' 11\").    Weight as of 2/12/21: 44.9 kg (98 lb 15.8 oz).        She reports that she has never smoked. She has never used smokeless tobacco.      Counseling Resources:  ATP IV Guidelines  Pooled Cohorts Equation Calculator  Breast Cancer Risk Calculator  BRCA-Related Cancer Risk Assessment: FHS-7 Tool  FRAX Risk Assessment  ICSI Preventive Guidelines  Dietary Guidelines for Americans, 2010  USDA's MyPlate  ASA Prophylaxis  Lung CA Screening    Zheng De La Rosa MD  [facility] [place]  "

## 2021-04-08 ENCOUNTER — OFFICE VISIT (OUTPATIENT)
Dept: FAMILY MEDICINE | Facility: OTHER | Age: 81
End: 2021-04-08
Attending: FAMILY MEDICINE
Payer: COMMERCIAL

## 2021-04-08 VITALS
OXYGEN SATURATION: 98 % | HEART RATE: 87 BPM | SYSTOLIC BLOOD PRESSURE: 138 MMHG | BODY MASS INDEX: 20.78 KG/M2 | DIASTOLIC BLOOD PRESSURE: 60 MMHG | HEIGHT: 58 IN | WEIGHT: 99 LBS | TEMPERATURE: 96.4 F

## 2021-04-08 DIAGNOSIS — M81.0 AGE-RELATED OSTEOPOROSIS WITHOUT CURRENT PATHOLOGICAL FRACTURE: ICD-10-CM

## 2021-04-08 DIAGNOSIS — E78.2 MIXED HYPERLIPIDEMIA: Primary | ICD-10-CM

## 2021-04-08 DIAGNOSIS — C91.10 CHRONIC LYMPHOCYTIC LEUKEMIA (H): ICD-10-CM

## 2021-04-08 DIAGNOSIS — Z12.31 ENCOUNTER FOR SCREENING MAMMOGRAM FOR BREAST CANCER: ICD-10-CM

## 2021-04-08 DIAGNOSIS — Z71.85 IMMUNIZATION COUNSELING: ICD-10-CM

## 2021-04-08 LAB
CHOLEST SERPL-MCNC: 286 MG/DL
HDLC SERPL-MCNC: 73 MG/DL
LDLC SERPL CALC-MCNC: 167 MG/DL
NONHDLC SERPL-MCNC: 213 MG/DL
TRIGL SERPL-MCNC: 229 MG/DL
TSH SERPL DL<=0.005 MIU/L-ACNC: 4 MU/L (ref 0.4–4)

## 2021-04-08 PROCEDURE — G0463 HOSPITAL OUTPT CLINIC VISIT: HCPCS

## 2021-04-08 PROCEDURE — 80061 LIPID PANEL: CPT | Mod: ZL | Performed by: FAMILY MEDICINE

## 2021-04-08 PROCEDURE — 84443 ASSAY THYROID STIM HORMONE: CPT | Mod: ZL | Performed by: FAMILY MEDICINE

## 2021-04-08 PROCEDURE — 36415 COLL VENOUS BLD VENIPUNCTURE: CPT | Mod: ZL | Performed by: FAMILY MEDICINE

## 2021-04-08 PROCEDURE — 99214 OFFICE O/P EST MOD 30 MIN: CPT | Performed by: FAMILY MEDICINE

## 2021-04-08 ASSESSMENT — ANXIETY QUESTIONNAIRES
3. WORRYING TOO MUCH ABOUT DIFFERENT THINGS: NOT AT ALL
5. BEING SO RESTLESS THAT IT IS HARD TO SIT STILL: NOT AT ALL
2. NOT BEING ABLE TO STOP OR CONTROL WORRYING: NOT AT ALL
GAD7 TOTAL SCORE: 0
1. FEELING NERVOUS, ANXIOUS, OR ON EDGE: NOT AT ALL
6. BECOMING EASILY ANNOYED OR IRRITABLE: NOT AT ALL
7. FEELING AFRAID AS IF SOMETHING AWFUL MIGHT HAPPEN: NOT AT ALL
4. TROUBLE RELAXING: NOT AT ALL

## 2021-04-08 ASSESSMENT — PAIN SCALES - GENERAL: PAINLEVEL: NO PAIN (0)

## 2021-04-08 ASSESSMENT — MIFFLIN-ST. JEOR: SCORE: 823.06

## 2021-04-08 ASSESSMENT — PATIENT HEALTH QUESTIONNAIRE - PHQ9: SUM OF ALL RESPONSES TO PHQ QUESTIONS 1-9: 0

## 2021-04-08 NOTE — NURSING NOTE
"Chief Complaint   Patient presents with     Physical       Initial /60   Pulse 87   Temp 96.4  F (35.8  C)   Ht 1.48 m (4' 10.27\")   Wt 44.9 kg (99 lb)   SpO2 98%   BMI 20.50 kg/m   Estimated body mass index is 20.5 kg/m  as calculated from the following:    Height as of this encounter: 1.48 m (4' 10.27\").    Weight as of this encounter: 44.9 kg (99 lb).  Medication Reconciliation: complete  Ousmane Rich LPN  "

## 2021-04-09 ENCOUNTER — IMMUNIZATION (OUTPATIENT)
Dept: FAMILY MEDICINE | Facility: OTHER | Age: 81
End: 2021-04-09
Attending: FAMILY MEDICINE
Payer: COMMERCIAL

## 2021-04-09 PROCEDURE — 91303 PR COVID VAC JANSSEN AD26 0.5ML: CPT

## 2021-04-09 ASSESSMENT — ANXIETY QUESTIONNAIRES: GAD7 TOTAL SCORE: 0

## 2021-05-07 ENCOUNTER — INFUSION THERAPY VISIT (OUTPATIENT)
Dept: INFUSION THERAPY | Facility: OTHER | Age: 81
End: 2021-05-07
Attending: NURSE PRACTITIONER
Payer: COMMERCIAL

## 2021-05-07 DIAGNOSIS — C91.10 CHRONIC LYMPHOCYTIC LEUKEMIA (H): Primary | ICD-10-CM

## 2021-05-07 PROCEDURE — 96523 IRRIG DRUG DELIVERY DEVICE: CPT

## 2021-05-07 PROCEDURE — 250N000011 HC RX IP 250 OP 636: Performed by: NURSE PRACTITIONER

## 2021-05-07 RX ORDER — HEPARIN SODIUM (PORCINE) LOCK FLUSH IV SOLN 100 UNIT/ML 100 UNIT/ML
500 SOLUTION INTRAVENOUS EVERY 8 HOURS
Status: DISCONTINUED | OUTPATIENT
Start: 2021-05-07 | End: 2021-05-07 | Stop reason: HOSPADM

## 2021-05-07 RX ORDER — HEPARIN SODIUM (PORCINE) LOCK FLUSH IV SOLN 100 UNIT/ML 100 UNIT/ML
500 SOLUTION INTRAVENOUS EVERY 8 HOURS
Status: CANCELLED
Start: 2021-05-07

## 2021-05-07 RX ADMIN — HEPARIN 500 UNITS: 100 SYRINGE at 08:26

## 2021-05-07 NOTE — PROGRESS NOTES
Patients port accessed using non-coring, 19 gauge, 3/4 needle. Port accessed per facility protocol.   Hand hygiene performed: yes   Mask donned by caregiver: yes   Site prepped with CHG: yes   Labs drawn: yes   Dressing applied using aseptic technique: yes   Port flushed easily, blood return noted.  No signs and symptoms of infection or infiltration.  Port flushed 10 mLs Normal Saline then Heparin 5mLs of 100 unit/mL.  Needle removed, small dressing applied.  Patient discharged with no complaints.

## 2021-06-18 ENCOUNTER — INFUSION THERAPY VISIT (OUTPATIENT)
Dept: INFUSION THERAPY | Facility: OTHER | Age: 81
End: 2021-06-18
Attending: NURSE PRACTITIONER
Payer: COMMERCIAL

## 2021-06-18 DIAGNOSIS — C91.10 CHRONIC LYMPHOCYTIC LEUKEMIA (H): Primary | ICD-10-CM

## 2021-06-18 PROCEDURE — 250N000011 HC RX IP 250 OP 636: Performed by: NURSE PRACTITIONER

## 2021-06-18 PROCEDURE — 96523 IRRIG DRUG DELIVERY DEVICE: CPT

## 2021-06-18 RX ORDER — HEPARIN SODIUM (PORCINE) LOCK FLUSH IV SOLN 100 UNIT/ML 100 UNIT/ML
500 SOLUTION INTRAVENOUS EVERY 8 HOURS
Status: DISCONTINUED | OUTPATIENT
Start: 2021-06-18 | End: 2021-06-18 | Stop reason: HOSPADM

## 2021-06-18 RX ORDER — HEPARIN SODIUM (PORCINE) LOCK FLUSH IV SOLN 100 UNIT/ML 100 UNIT/ML
500 SOLUTION INTRAVENOUS EVERY 8 HOURS
Status: CANCELLED
Start: 2021-06-18

## 2021-06-18 RX ADMIN — HEPARIN 500 UNITS: 100 SYRINGE at 08:27

## 2021-06-18 NOTE — PATIENT INSTRUCTIONS

## 2021-06-18 NOTE — PROGRESS NOTES
Patient is a 80 year old here today for port flush per order of Dr Bach.  Skin is warm, clean, dry, and intact without redness, swelling, nor other signs of infection noted.  Port accessed via sterile technique per facility protocol with a 20 gauge, 3/4 inch non coring 90 degree bent hutchison power needle. Port flushed easily, without resistance. Immediate blood return noted.  Flushed with 10 cc 0.9% normal saline and 5 cc heparinized saline.  Needle removed intact, sterile dressing applied.  Slight pressure applied for 30 seconds.   Patient tolerated port flush well, denies pain nor discomfort at this time. Patient instructed to leave dressing intact for a minimum of one hour, and to call with questions or concerns.  Patient states understanding and is in agreement with this plan.  Patient discharged ambulatory.

## 2021-08-04 ENCOUNTER — LAB (OUTPATIENT)
Dept: INFUSION THERAPY | Facility: OTHER | Age: 81
End: 2021-08-04
Attending: NURSE PRACTITIONER
Payer: COMMERCIAL

## 2021-08-04 DIAGNOSIS — C91.10 CHRONIC LYMPHOCYTIC LEUKEMIA (H): Primary | ICD-10-CM

## 2021-08-04 LAB
ALBUMIN SERPL-MCNC: 3.9 G/DL (ref 3.4–5)
ALP SERPL-CCNC: 81 U/L (ref 40–150)
ALT SERPL W P-5'-P-CCNC: 27 U/L (ref 0–50)
ANION GAP SERPL CALCULATED.3IONS-SCNC: 5 MMOL/L (ref 3–14)
AST SERPL W P-5'-P-CCNC: 26 U/L (ref 0–45)
BASOPHILS # BLD AUTO: 0 10E3/UL (ref 0–0.2)
BASOPHILS NFR BLD AUTO: 0 %
BILIRUB SERPL-MCNC: 0.7 MG/DL (ref 0.2–1.3)
BUN SERPL-MCNC: 16 MG/DL (ref 7–30)
CALCIUM SERPL-MCNC: 9.2 MG/DL (ref 8.5–10.1)
CHLORIDE BLD-SCNC: 106 MMOL/L (ref 94–109)
CO2 SERPL-SCNC: 27 MMOL/L (ref 20–32)
CREAT SERPL-MCNC: 0.78 MG/DL (ref 0.52–1.04)
EOSINOPHIL # BLD AUTO: 0.1 10E3/UL (ref 0–0.7)
EOSINOPHIL NFR BLD AUTO: 1 %
ERYTHROCYTE [DISTWIDTH] IN BLOOD BY AUTOMATED COUNT: 13.8 % (ref 10–15)
GFR SERPL CREATININE-BSD FRML MDRD: 73 ML/MIN/1.73M2
GLUCOSE BLD-MCNC: 102 MG/DL (ref 70–99)
HCT VFR BLD AUTO: 41.4 % (ref 35–47)
HGB BLD-MCNC: 13.9 G/DL (ref 11.7–15.7)
IMM GRANULOCYTES # BLD: 0 10E3/UL
IMM GRANULOCYTES NFR BLD: 0 %
LDH SERPL L TO P-CCNC: 206 U/L (ref 81–234)
LYMPHOCYTES # BLD AUTO: 1.3 10E3/UL (ref 0.8–5.3)
LYMPHOCYTES NFR BLD AUTO: 12 %
MCH RBC QN AUTO: 30 PG (ref 26.5–33)
MCHC RBC AUTO-ENTMCNC: 33.6 G/DL (ref 31.5–36.5)
MCV RBC AUTO: 89 FL (ref 78–100)
MONOCYTES # BLD AUTO: 0.8 10E3/UL (ref 0–1.3)
MONOCYTES NFR BLD AUTO: 7 %
NEUTROPHILS # BLD AUTO: 8.1 10E3/UL (ref 1.6–8.3)
NEUTROPHILS NFR BLD AUTO: 80 %
NRBC # BLD AUTO: 0 10E3/UL
NRBC BLD AUTO-RTO: 0 /100
PLATELET # BLD AUTO: 272 10E3/UL (ref 150–450)
POTASSIUM BLD-SCNC: 4 MMOL/L (ref 3.4–5.3)
PROT SERPL-MCNC: 7.4 G/DL (ref 6.8–8.8)
RBC # BLD AUTO: 4.63 10E6/UL (ref 3.8–5.2)
SODIUM SERPL-SCNC: 138 MMOL/L (ref 133–144)
WBC # BLD AUTO: 10.3 10E3/UL (ref 4–11)

## 2021-08-04 PROCEDURE — 83615 LACTATE (LD) (LDH) ENZYME: CPT | Mod: ZL

## 2021-08-04 PROCEDURE — 82040 ASSAY OF SERUM ALBUMIN: CPT | Mod: ZL

## 2021-08-04 PROCEDURE — 96523 IRRIG DRUG DELIVERY DEVICE: CPT

## 2021-08-04 PROCEDURE — 250N000011 HC RX IP 250 OP 636: Performed by: NURSE PRACTITIONER

## 2021-08-04 PROCEDURE — 36415 COLL VENOUS BLD VENIPUNCTURE: CPT | Mod: ZL

## 2021-08-04 PROCEDURE — 85004 AUTOMATED DIFF WBC COUNT: CPT | Mod: ZL

## 2021-08-04 RX ORDER — HEPARIN SODIUM (PORCINE) LOCK FLUSH IV SOLN 100 UNIT/ML 100 UNIT/ML
500 SOLUTION INTRAVENOUS EVERY 8 HOURS
Status: DISCONTINUED | OUTPATIENT
Start: 2021-08-04 | End: 2021-08-04 | Stop reason: HOSPADM

## 2021-08-04 RX ORDER — HEPARIN SODIUM (PORCINE) LOCK FLUSH IV SOLN 100 UNIT/ML 100 UNIT/ML
500 SOLUTION INTRAVENOUS EVERY 8 HOURS
Status: CANCELLED
Start: 2021-08-04

## 2021-08-04 RX ADMIN — HEPARIN 500 UNITS: 100 SYRINGE at 08:55

## 2021-08-04 NOTE — PATIENT INSTRUCTIONS

## 2021-08-04 NOTE — PROGRESS NOTES
Patients left port accessed using non-coring, 19 gauge, 3/4 inch power needle. Port accessed per facility protocol.  Hand hygiene performed: yes   Mask donned by caregiver: yes Site prepped with CHG: yes Labs drawn: yes Dressing applied using aseptic technique: yes Port flushed easily, without resistance. Flushed with 10 cc's normal saline.   Immediate blood return noted. 10 cc blood discarded.  2 vials blood draw and sent to lab for results.  Port flushed with 20 cc 0.9% normal saline and 5 cc heparinized saline.  Needle removed intact, sterile dressing applied.  Slight pressure applied for 30 seconds.   Patient tolerated port flush well, denies pain nor discomfort at this time. Patient instructed to leave dressing intact for a minimum of one hour, and to call with questions or concerns.  Patient states understanding and is in agreement with this plan. Patient discharged ambulatory.

## 2021-09-12 ENCOUNTER — HEALTH MAINTENANCE LETTER (OUTPATIENT)
Age: 81
End: 2021-09-12

## 2021-09-24 ENCOUNTER — INFUSION THERAPY VISIT (OUTPATIENT)
Dept: INFUSION THERAPY | Facility: OTHER | Age: 81
End: 2021-09-24
Attending: NURSE PRACTITIONER
Payer: COMMERCIAL

## 2021-09-24 DIAGNOSIS — C91.10 CHRONIC LYMPHOCYTIC LEUKEMIA (H): Primary | ICD-10-CM

## 2021-09-24 PROCEDURE — 250N000011 HC RX IP 250 OP 636: Performed by: NURSE PRACTITIONER

## 2021-09-24 PROCEDURE — 96523 IRRIG DRUG DELIVERY DEVICE: CPT

## 2021-09-24 RX ORDER — HEPARIN SODIUM (PORCINE) LOCK FLUSH IV SOLN 100 UNIT/ML 100 UNIT/ML
500 SOLUTION INTRAVENOUS EVERY 8 HOURS
Status: CANCELLED
Start: 2021-09-24

## 2021-09-24 RX ORDER — HEPARIN SODIUM (PORCINE) LOCK FLUSH IV SOLN 100 UNIT/ML 100 UNIT/ML
500 SOLUTION INTRAVENOUS EVERY 8 HOURS
Status: DISCONTINUED | OUTPATIENT
Start: 2021-09-24 | End: 2021-09-24 | Stop reason: HOSPADM

## 2021-09-24 RX ADMIN — HEPARIN 500 UNITS: 100 SYRINGE at 12:59

## 2021-11-05 ENCOUNTER — INFUSION THERAPY VISIT (OUTPATIENT)
Dept: INFUSION THERAPY | Facility: OTHER | Age: 81
End: 2021-11-05
Attending: NURSE PRACTITIONER
Payer: COMMERCIAL

## 2021-11-05 DIAGNOSIS — C91.10 CHRONIC LYMPHOCYTIC LEUKEMIA (H): Primary | ICD-10-CM

## 2021-11-05 PROCEDURE — 96523 IRRIG DRUG DELIVERY DEVICE: CPT

## 2021-11-05 PROCEDURE — 250N000011 HC RX IP 250 OP 636: Performed by: NURSE PRACTITIONER

## 2021-11-05 RX ORDER — HEPARIN SODIUM (PORCINE) LOCK FLUSH IV SOLN 100 UNIT/ML 100 UNIT/ML
500 SOLUTION INTRAVENOUS EVERY 8 HOURS
Status: CANCELLED
Start: 2021-11-05

## 2021-11-05 RX ORDER — HEPARIN SODIUM (PORCINE) LOCK FLUSH IV SOLN 100 UNIT/ML 100 UNIT/ML
500 SOLUTION INTRAVENOUS EVERY 8 HOURS
Status: DISCONTINUED | OUTPATIENT
Start: 2021-11-05 | End: 2021-11-05 | Stop reason: HOSPADM

## 2021-11-05 RX ADMIN — HEPARIN 500 UNITS: 100 SYRINGE at 08:52

## 2021-11-05 NOTE — PATIENT INSTRUCTIONS

## 2021-11-05 NOTE — PROGRESS NOTES
Patients port accessed using non-coring, 20 gauge, 3/4 inch power needle. Port accessed per facility protocol.   Hand hygiene performed: yes   Mask donned by caregiver: yes   Site prepped with CHG: yes   Labs drawn: no   Dressing applied using aseptic technique: yes   Port flushed easily, blood return noted.  No signs and symptoms of infection or infiltration.  Port flushed 10 mLs Normal Saline then Heparin 5mLs of 100 unit/mL.  Needle removed, small dressing applied.  Patient discharged with no complaints.

## 2021-12-17 ENCOUNTER — INFUSION THERAPY VISIT (OUTPATIENT)
Dept: INFUSION THERAPY | Facility: OTHER | Age: 81
End: 2021-12-17
Attending: NURSE PRACTITIONER
Payer: COMMERCIAL

## 2021-12-17 DIAGNOSIS — C91.10 CHRONIC LYMPHOCYTIC LEUKEMIA (H): Primary | ICD-10-CM

## 2021-12-17 PROCEDURE — 250N000011 HC RX IP 250 OP 636: Performed by: NURSE PRACTITIONER

## 2021-12-17 PROCEDURE — 96523 IRRIG DRUG DELIVERY DEVICE: CPT

## 2021-12-17 RX ORDER — HEPARIN SODIUM (PORCINE) LOCK FLUSH IV SOLN 100 UNIT/ML 100 UNIT/ML
500 SOLUTION INTRAVENOUS EVERY 8 HOURS
Status: CANCELLED
Start: 2021-12-17

## 2021-12-17 RX ORDER — HEPARIN SODIUM (PORCINE) LOCK FLUSH IV SOLN 100 UNIT/ML 100 UNIT/ML
500 SOLUTION INTRAVENOUS EVERY 8 HOURS
Status: DISCONTINUED | OUTPATIENT
Start: 2021-12-17 | End: 2021-12-17 | Stop reason: HOSPADM

## 2021-12-17 RX ADMIN — Medication 500 UNITS: at 08:26

## 2021-12-17 NOTE — PROGRESS NOTES
Patients port accessed using non-coring, 20 gauge, 3/4 inch power needle. Port accessed per facility protocol.   Hand hygiene performed: yes   Mask donned by caregiver: yes   Site prepped with CHG: yes   Labs drawn: yes   Dressing applied using aseptic technique: yes   Port flushed easily, blood return noted.  No signs and symptoms of infection or infiltration.  Port flushed 10 mLs Normal Saline then Heparin 5mLs of 100 unit/mL.  Needle removed, small dressing applied.  Patient discharged with no complaints.

## 2021-12-17 NOTE — PATIENT INSTRUCTIONS

## 2022-01-27 ENCOUNTER — TELEPHONE (OUTPATIENT)
Dept: ONCOLOGY | Facility: OTHER | Age: 82
End: 2022-01-27
Payer: COMMERCIAL

## 2022-01-27 DIAGNOSIS — C91.10 CHRONIC LYMPHOCYTIC LEUKEMIA (H): Primary | ICD-10-CM

## 2022-01-27 NOTE — TELEPHONE ENCOUNTER
Patient coming tomorrow, Friday 1-28-22, for labs from [facility], with a follow up appointment with you the next week. We need lab orders entered. Thank you!

## 2022-01-28 ENCOUNTER — LAB (OUTPATIENT)
Dept: INFUSION THERAPY | Facility: OTHER | Age: 82
End: 2022-01-28
Attending: NURSE PRACTITIONER
Payer: COMMERCIAL

## 2022-01-28 DIAGNOSIS — C91.10 CHRONIC LYMPHOCYTIC LEUKEMIA (H): Primary | ICD-10-CM

## 2022-01-28 LAB
ALBUMIN SERPL-MCNC: 3.9 G/DL (ref 3.4–5)
ALP SERPL-CCNC: 84 U/L (ref 40–150)
ALT SERPL W P-5'-P-CCNC: 35 U/L (ref 0–50)
ANION GAP SERPL CALCULATED.3IONS-SCNC: 7 MMOL/L (ref 3–14)
AST SERPL W P-5'-P-CCNC: 27 U/L (ref 0–45)
BASOPHILS # BLD AUTO: 0 10E3/UL (ref 0–0.2)
BASOPHILS NFR BLD AUTO: 0 %
BILIRUB SERPL-MCNC: 0.4 MG/DL (ref 0.2–1.3)
BUN SERPL-MCNC: 15 MG/DL (ref 7–30)
CALCIUM SERPL-MCNC: 9.3 MG/DL (ref 8.5–10.1)
CHLORIDE BLD-SCNC: 104 MMOL/L (ref 94–109)
CO2 SERPL-SCNC: 28 MMOL/L (ref 20–32)
CREAT SERPL-MCNC: 0.69 MG/DL (ref 0.52–1.04)
EOSINOPHIL # BLD AUTO: 0.1 10E3/UL (ref 0–0.7)
EOSINOPHIL NFR BLD AUTO: 1 %
ERYTHROCYTE [DISTWIDTH] IN BLOOD BY AUTOMATED COUNT: 13.5 % (ref 10–15)
GFR SERPL CREATININE-BSD FRML MDRD: 88 ML/MIN/1.73M2
GLUCOSE BLD-MCNC: 104 MG/DL (ref 70–99)
HCT VFR BLD AUTO: 40.8 % (ref 35–47)
HGB BLD-MCNC: 13.6 G/DL (ref 11.7–15.7)
IMM GRANULOCYTES # BLD: 0 10E3/UL
IMM GRANULOCYTES NFR BLD: 0 %
LDH SERPL L TO P-CCNC: 203 U/L (ref 81–234)
LYMPHOCYTES # BLD AUTO: 1.3 10E3/UL (ref 0.8–5.3)
LYMPHOCYTES NFR BLD AUTO: 16 %
MCH RBC QN AUTO: 30 PG (ref 26.5–33)
MCHC RBC AUTO-ENTMCNC: 33.3 G/DL (ref 31.5–36.5)
MCV RBC AUTO: 90 FL (ref 78–100)
MONOCYTES # BLD AUTO: 0.6 10E3/UL (ref 0–1.3)
MONOCYTES NFR BLD AUTO: 7 %
NEUTROPHILS # BLD AUTO: 6.5 10E3/UL (ref 1.6–8.3)
NEUTROPHILS NFR BLD AUTO: 76 %
NRBC # BLD AUTO: 0 10E3/UL
NRBC BLD AUTO-RTO: 0 /100
PLATELET # BLD AUTO: 239 10E3/UL (ref 150–450)
POTASSIUM BLD-SCNC: 4 MMOL/L (ref 3.4–5.3)
PROT SERPL-MCNC: 7.2 G/DL (ref 6.8–8.8)
RBC # BLD AUTO: 4.53 10E6/UL (ref 3.8–5.2)
SODIUM SERPL-SCNC: 139 MMOL/L (ref 133–144)
WBC # BLD AUTO: 8.5 10E3/UL (ref 4–11)

## 2022-01-28 PROCEDURE — 36415 COLL VENOUS BLD VENIPUNCTURE: CPT | Mod: ZL

## 2022-01-28 PROCEDURE — 80053 COMPREHEN METABOLIC PANEL: CPT | Mod: ZL

## 2022-01-28 PROCEDURE — 83615 LACTATE (LD) (LDH) ENZYME: CPT | Mod: ZL

## 2022-01-28 PROCEDURE — 96523 IRRIG DRUG DELIVERY DEVICE: CPT

## 2022-01-28 PROCEDURE — 250N000011 HC RX IP 250 OP 636: Performed by: NURSE PRACTITIONER

## 2022-01-28 PROCEDURE — 85025 COMPLETE CBC W/AUTO DIFF WBC: CPT | Mod: ZL

## 2022-01-28 RX ORDER — HEPARIN SODIUM (PORCINE) LOCK FLUSH IV SOLN 100 UNIT/ML 100 UNIT/ML
500 SOLUTION INTRAVENOUS EVERY 8 HOURS
Status: DISCONTINUED | OUTPATIENT
Start: 2022-01-28 | End: 2022-01-28 | Stop reason: HOSPADM

## 2022-01-28 RX ORDER — HEPARIN SODIUM (PORCINE) LOCK FLUSH IV SOLN 100 UNIT/ML 100 UNIT/ML
500 SOLUTION INTRAVENOUS EVERY 8 HOURS
Status: CANCELLED
Start: 2022-01-28

## 2022-01-28 RX ADMIN — Medication 500 UNITS: at 08:35

## 2022-01-28 NOTE — PROGRESS NOTES
Patients left sided port accessed using non-coring, 19 gauge, 3/4 needle. Port accessed per facility protocol.  Hand hygiene performed: yes   Mask donned by caregiver: yes Site prepped with CHG: yes Labs drawn: yes Dressing applied using aseptic technique: yes Port flushed easily, without resistance. Flushed with 10 cc's normal saline.   Immediate blood return noted. 10 cc blood discarded.  3 vials blood draw and sent to lab for results.  Port flushed with 20 cc 0.9% normal saline and 5 cc heparinized saline.  Needle removed intact, sterile dressing applied.  Slight pressure applied for 30 seconds.   Patient tolerated port flush well, denies pain nor discomfort at this time. Patient instructed to leave dressing intact for a minimum of one hour, and to call with questions or concerns.  Patient states understanding and is in agreement with this plan. Patient discharged ambulatory. Shannan Barton RN

## 2022-02-04 ENCOUNTER — ONCOLOGY VISIT (OUTPATIENT)
Dept: ONCOLOGY | Facility: OTHER | Age: 82
End: 2022-02-04
Attending: NURSE PRACTITIONER
Payer: COMMERCIAL

## 2022-02-04 VITALS
HEART RATE: 75 BPM | SYSTOLIC BLOOD PRESSURE: 124 MMHG | BODY MASS INDEX: 19.87 KG/M2 | DIASTOLIC BLOOD PRESSURE: 70 MMHG | RESPIRATION RATE: 20 BRPM | TEMPERATURE: 96.7 F | OXYGEN SATURATION: 99 % | HEIGHT: 59 IN | WEIGHT: 98.55 LBS

## 2022-02-04 DIAGNOSIS — Z23 NEED FOR PROPHYLACTIC VACCINATION AND INOCULATION AGAINST INFLUENZA: ICD-10-CM

## 2022-02-04 DIAGNOSIS — C91.10 CHRONIC LYMPHOCYTIC LEUKEMIA (H): Primary | ICD-10-CM

## 2022-02-04 PROCEDURE — 99213 OFFICE O/P EST LOW 20 MIN: CPT | Performed by: NURSE PRACTITIONER

## 2022-02-04 PROCEDURE — G0463 HOSPITAL OUTPT CLINIC VISIT: HCPCS | Mod: 25

## 2022-02-04 PROCEDURE — G0463 HOSPITAL OUTPT CLINIC VISIT: HCPCS

## 2022-02-04 PROCEDURE — G0008 ADMIN INFLUENZA VIRUS VAC: HCPCS

## 2022-02-04 ASSESSMENT — MIFFLIN-ST. JEOR: SCORE: 827.63

## 2022-02-04 ASSESSMENT — PAIN SCALES - GENERAL: PAINLEVEL: NO PAIN (0)

## 2022-02-04 NOTE — PATIENT INSTRUCTIONS
We will give you a Flu shot today.    We will set you up for a port flush every 6 weeks.    We would like to see you back in 6 months. Please come 30 minutes prior for lab work through your port..    If you have any questions please call [phone]    Other instructions:  none

## 2022-02-04 NOTE — PROGRESS NOTES
Oncology Follow-up Visit:  February 4, 2022    Reason for Visit:  Patient presents with:  Oncology Clinic Visit: Follow up Chronic lymphocytic leukemia      Nursing Note and documentation reviewed: yes    HPI:   This is a 81-year-old female patient who presents to the oncology clinic today in follow up of CLL diagnosed December 2011. She completed 6 cycles of Bendamustine July 2, 2015.   She is being followed with surveillance.    She presents to the clinic today stating she is doing well and offers no new complaints.  She is lost another pound since I saw her last time and she feels is likely related to not going out to eat anymore.  She really tries to keep herself fairly isolated due to the Covid situation.  She denies any issues with fevers, night sweats or new lumps.    Oncologic History:     Jennifer was diagnosed with CLL in December 2011 after her primary care provider noted an elevated WBC on her annual exam. A peripheral blood flow for cytometry revealed she was CD5 positive, CD10 negative, consistent with CLL/SLL. Staging studies were completed and CT scan showed no evidence of lymphadenopathy or splenomegaly. She was asymptomatic with essentially stable lymphocyte count and followed with surveillance.      With patient's follow up in October 2014, hemoglobin was noted to be 10.7 with Hematocrit 30.0 and platelets 137,000. She was subsequently seen again 2 months later with further drop in hemoglobin to 8.6 with hematocrit 26.1 and platelets 140,000 with a white blood cell count of 13.5 and ANC 1.2, absolute lymphocytes were 12.1. A PET scan was completed on 12/11/2014 which did show mildly enlarged axillary lymph nodes with very mild abnormal hypermetabolism of SUV of 2 or less. Bone marrow aspiration and biopsy was completed which revealed infiltration of CLL with 90% of cells confirmed to be CLL via flow morphologically. Patient was staged at least stage III CLL based on her anemia. The plan was to proceed  with chemotherapy consisting of Rituxan and Fludara. Patient did experience hypersensitivity reaction with cycle 2 Rituxan therapy experiencing hypotension. This was discontinued and chemotherapy regime was changed to Bendamustine day 1 and day 2 every 28 days. She was started on 2/12/2015 and completed therapy in July 2015.      Current Chemo Regime/TX:  n/a  Current Cycle:  n/a  # of completed cycles:  n/a      Previous treatment: Rituxan/Fludara completing one full cycle and experienced hypersensitivity with Rituxan with second cycle;  Bendamustine 50 mg per metered squared days 1 and 2 every 28 days x6 cycles completed 7/2/15      Past Medical History:   Diagnosis Date     Arthritis     back, fingers, legs     Cancer (H)     chronic lymphocytic leukemia     Chronic lymphoid leukemia, 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 2/28/2012     Diverticulosis of colon (without mention of hemorrhage) 11/29/2010     Family history of ischemic heart disease 11/29/2010     Family history of malignant neoplasm of gastrointestinal tract 10/14/2002     History of blood transfusion     prior to chemo     Non-toxic multinodular goiter 11/8/2016     Osteoporosis, unspecified 11/29/2010     Other and unspecified hyperlipidemia 11/28/2011     Viral warts, unspecified 11/28/2011       Past Surgical History:   Procedure Laterality Date     ------------OTHER-------------      elbow repair     COLONOSCOPY  01/12/2010    repeat in 2015     COLONOSCOPY      SEVERAL COLONOSCOPIES - ALL BEEN NORMAL - REFUSES ANY MORE      COLONOSCOPY       INSERT PORT VASCULAR ACCESS N/A 1/5/2015    Procedure: INSERT PORT VASCULAR ACCESS;  Surgeon: Linda Olvier MD;  Location: [facility]     IR PORT CHECK LEFT  5/20/2020     ORTHOPEDIC SURGERY  1993    Left elbow repair       Family History   Problem Relation Age of Onset     Cancer Mother 97        colon cancer - cause of death     Colon Cancer Mother      Cancer Father      Colon Cancer  Father      Diabetes Brother      Obesity Brother      Breast Cancer Daughter      Hyperlipidemia Daughter      Thyroid Disease Daughter      Hypertension Son      Hyperlipidemia Son      Thyroid Disease Cousin      Hyperlipidemia Son      Coronary Artery Disease No family hx of      Cerebrovascular Disease No family hx of      Prostate Cancer No family hx of      Other Cancer No family hx of      Anesthesia Reaction No family hx of      Asthma No family hx of      Genetic Disorder No family hx of        Social History     Socioeconomic History     Marital status:      Spouse name: Not on file     Number of children: Not on file     Years of education: Not on file     Highest education level: Not on file   Occupational History     Occupation: [employer]   Tobacco Use     Smoking status: Never Smoker     Smokeless tobacco: Never Used   Substance and Sexual Activity     Alcohol use: No     Drug use: No     Sexual activity: Never     Comment: devorced   Other Topics Concern     Parent/sibling w/ CABG, MI or angioplasty before 65F 55M? No      Service No     Blood Transfusions Yes     Comment: permits if needed     Caffeine Concern Yes     Comment: 1 cup     Occupational Exposure No     Hobby Hazards No     Sleep Concern No     Stress Concern No     Weight Concern No     Special Diet No     Back Care No     Exercise No     Bike Helmet Not Asked     Seat Belt Yes     Self-Exams Not Asked   Social History Narrative     Not on file     Social Determinants of Health     Financial Resource Strain: Not on file   Food Insecurity: Not on file   Transportation Needs: Not on file   Physical Activity: Not on file   Stress: Not on file   Social Connections: Not on file   Intimate Partner Violence: Not on file   Housing Stability: Not on file       Current Outpatient Medications   Medication     alendronate (FOSAMAX) 70 MG tablet     Ascorbic Acid (VITAMIN C PO)     Cholecalciferol (VITAMIN D3 PO)     Multiple  "Vitamins-Minerals (CENTRUM SILVER 50+WOMEN PO)     lidocaine-prilocaine (EMLA) cream     No current facility-administered medications for this visit.        Allergies   Allergen Reactions     Sulfa Drugs Rash     Rituxan [Rituximab]      Simvastatin Other (See Comments)     Takes pravastatin at home  Zocor - myalgia       Review Of Systems:  Constitutional:    denies fever, weight changes, chills, and night sweats.  Eyes:    denies blurred or double vision  Ears/Nose/Throat:   denies ear pain, nose problems, difficulty swallowing  Respiratory:   denies shortness of breath, cough  Skin:   denies rash, lesions  Cardiovascular:   denies chest pain, palpitations, edema  Gastrointestinal:   denies abdominal pain, bloating, nausea, early satiety; no change in bowel habits or blood in stool  Genitourinary:   denies difficulty with urination, blood in urine  Musculoskeletal:    denies new muscle pain, bone pain  Neurologic:   denies lightheadedness, headaches, numbness or tingling  Psychiatric:   denies anxiety, depression  Hematologic/Lymphatic/Immunologic:   denies easy bruising, easy bleeding, lumps or bumps noted  Endocrine:   Denies increased thirst      Physical Exam:  /70   Pulse 75   Temp (!) 96.7  F (35.9  C) (Tympanic)   Resp 20   Ht 1.499 m (4' 11\")   Wt 44.7 kg (98 lb 8.7 oz)   SpO2 99%   BMI 19.90 kg/m      GENERAL APPEARANCE:  alert and in no acute distress.  HEENT: Normocephalic, Sclerae anicteric.  No oral lesions or thrush.  NECK:   No asymmetry or masses, no thyromegaly.  LYMPHATICS: No palpable cervical, supraclavicular, axillary, or inguinal nodes   RESP: Lungs clear to auscultation bilaterally, respirations regular and easy  CARDIOVASCULAR: Regular rate and rhythm. Normal S1, S2; no murmur, gallop, or rub.  ABDOMEN: Soft, nontender. Bowel sounds auscultated all 4 quadrants. No palpable organomegaly or masses.  MUSCULOSKELETAL: Extremities without gross deformities noted.   NEURO: Alert and " oriented x 3.  Gait steady.  PSYCHIATRIC: Mentation and affect appear normal.  Mood appropriate.    Laboratory:  Component      Latest Ref Rng & Units 1/28/2022   WBC      4.0 - 11.0 10e3/uL 8.5   RBC Count      3.80 - 5.20 10e6/uL 4.53   Hemoglobin      11.7 - 15.7 g/dL 13.6   Hematocrit      35.0 - 47.0 % 40.8   MCV      78 - 100 fL 90   MCH      26.5 - 33.0 pg 30.0   MCHC      31.5 - 36.5 g/dL 33.3   RDW      10.0 - 15.0 % 13.5   Platelet Count      150 - 450 10e3/uL 239   % Neutrophils      % 76   % Lymphocytes      % 16   % Monocytes      % 7   % Eosinophils      % 1   % Basophils      % 0   % Immature Granulocytes      % 0   NRBCs per 100 WBC      <1 /100 0   Absolute Neutrophils      1.6 - 8.3 10e3/uL 6.5   Absolute Lymphocytes      0.8 - 5.3 10e3/uL 1.3   Absolute Monocytes      0.0 - 1.3 10e3/uL 0.6   Absolute Eosinophils      0.0 - 0.7 10e3/uL 0.1   Absolute Basophils      0.0 - 0.2 10e3/uL 0.0   Absolute Immature Granulocytes      <=0.4 10e3/uL 0.0   Absolute NRBCs      10e3/uL 0.0   Sodium      133 - 144 mmol/L 139   Potassium      3.4 - 5.3 mmol/L 4.0   Chloride      94 - 109 mmol/L 104   Carbon Dioxide      20 - 32 mmol/L 28   Anion Gap      3 - 14 mmol/L 7   Urea Nitrogen      7 - 30 mg/dL 15   Creatinine      0.52 - 1.04 mg/dL 0.69   Calcium      8.5 - 10.1 mg/dL 9.3   Glucose      70 - 99 mg/dL 104 (H)   Alkaline Phosphatase      40 - 150 U/L 84   AST      0 - 45 U/L 27   ALT      0 - 50 U/L 35   Protein Total      6.8 - 8.8 g/dL 7.2   Albumin      3.4 - 5.0 g/dL 3.9   Bilirubin Total      0.2 - 1.3 mg/dL 0.4   GFR Estimate      >60 mL/min/1.73m2 88   Lactate Dehydrogenase      81 - 234 U/L 203       Imaging Studies: None completed for today's visit      ASSESSMENT/PLAN:    #1 CLL/SLL: Diagnosed with CLL/SLL in December 2011. Stage III. She received 6 cycles of bendamustine completing this July 2015 and is now followed with surveillance.  She is feeling good.  Labs are good with normal LDH.  She  will follow up in 6 months with a CBC, CMP and LDH.     I encouraged patient to call with any questions or concerns.    Yaneli Gray, NP  APRN, FNP-BC, AOCNP

## 2022-02-04 NOTE — NURSING NOTE
"Oncology Rooming Note    February 4, 2022 8:16 AM   Jennifer Barajas is a 81 year old female who presents for:    Chief Complaint   Patient presents with     Oncology Clinic Visit     Follow up Chronic lymphocytic leukemia      Initial Vitals: /70   Pulse 75   Temp (!) 96.7  F (35.9  C) (Tympanic)   Resp 20   Ht 1.499 m (4' 11\")   Wt 44.7 kg (98 lb 8.7 oz)   SpO2 99%   BMI 19.90 kg/m   Estimated body mass index is 19.9 kg/m  as calculated from the following:    Height as of this encounter: 1.499 m (4' 11\").    Weight as of this encounter: 44.7 kg (98 lb 8.7 oz). Body surface area is 1.36 meters squared.  No Pain (0) Comment: Data Unavailable   No LMP recorded. Patient is postmenopausal.  Allergies reviewed: Yes  Medications reviewed: Yes    Medications: Medication refills not needed today.  Pharmacy name entered into EPIC:    [id number] - [place], [place] - [address] [id number] - [place], [place] - [address] AT [facility] [address]          Ana Tate LPN            "

## 2022-02-23 DIAGNOSIS — M81.0 OSTEOPOROSIS, UNSPECIFIED OSTEOPOROSIS TYPE, UNSPECIFIED PATHOLOGICAL FRACTURE PRESENCE: ICD-10-CM

## 2022-02-23 RX ORDER — ALENDRONATE SODIUM 70 MG/1
TABLET ORAL
Qty: 12 TABLET | Refills: 2 | Status: SHIPPED | OUTPATIENT
Start: 2022-02-23 | End: 2022-08-09

## 2022-02-23 NOTE — TELEPHONE ENCOUNTER
FOSAMAX      Last Written Prescription Date:  2-  Last Fill Quantity: 12,   # refills: 3  Last Office Visit: 4-8-2021  Future Office visit:       Routing refill request to provider for review/approval because:  Drug not on the [facility], [facility] or [facility] refill protocol or controlled substance

## 2022-02-27 ENCOUNTER — HEALTH MAINTENANCE LETTER (OUTPATIENT)
Age: 82
End: 2022-02-27

## 2022-03-18 ENCOUNTER — INFUSION THERAPY VISIT (OUTPATIENT)
Dept: INFUSION THERAPY | Facility: OTHER | Age: 82
End: 2022-03-18
Attending: NURSE PRACTITIONER
Payer: COMMERCIAL

## 2022-03-18 DIAGNOSIS — C91.10 CHRONIC LYMPHOCYTIC LEUKEMIA (H): Primary | ICD-10-CM

## 2022-03-18 PROCEDURE — 250N000011 HC RX IP 250 OP 636: Performed by: NURSE PRACTITIONER

## 2022-03-18 PROCEDURE — 96523 IRRIG DRUG DELIVERY DEVICE: CPT

## 2022-03-18 RX ORDER — HEPARIN SODIUM (PORCINE) LOCK FLUSH IV SOLN 100 UNIT/ML 100 UNIT/ML
500 SOLUTION INTRAVENOUS EVERY 8 HOURS
Status: CANCELLED
Start: 2022-03-18

## 2022-03-18 RX ORDER — HEPARIN SODIUM (PORCINE) LOCK FLUSH IV SOLN 100 UNIT/ML 100 UNIT/ML
500 SOLUTION INTRAVENOUS EVERY 8 HOURS
Status: DISCONTINUED | OUTPATIENT
Start: 2022-03-18 | End: 2022-03-18 | Stop reason: HOSPADM

## 2022-03-18 RX ADMIN — HEPARIN 500 UNITS: 100 SYRINGE at 09:00

## 2022-03-18 NOTE — PROGRESS NOTES
Patients port accessed using non-coring, 19 gauge, 3/4 inch needle. Port accessed per facility protocol.   Hand hygiene performed: yes   Mask donned by caregiver: yes   Site prepped with CHG: yes  Dressing applied using aseptic technique: yes   Port flushed easily, blood return noted.  No signs and symptoms of infection or infiltration.  Port flushed per MAR.  Needle removed, small dressing applied.  Patient discharged with no complaints.

## 2022-04-29 ENCOUNTER — INFUSION THERAPY VISIT (OUTPATIENT)
Dept: INFUSION THERAPY | Facility: OTHER | Age: 82
End: 2022-04-29
Attending: NURSE PRACTITIONER
Payer: COMMERCIAL

## 2022-04-29 DIAGNOSIS — C91.10 CHRONIC LYMPHOCYTIC LEUKEMIA (H): Primary | ICD-10-CM

## 2022-04-29 PROCEDURE — 96523 IRRIG DRUG DELIVERY DEVICE: CPT

## 2022-04-29 PROCEDURE — 250N000011 HC RX IP 250 OP 636: Performed by: NURSE PRACTITIONER

## 2022-04-29 RX ORDER — HEPARIN SODIUM (PORCINE) LOCK FLUSH IV SOLN 100 UNIT/ML 100 UNIT/ML
500 SOLUTION INTRAVENOUS EVERY 8 HOURS
Status: CANCELLED
Start: 2022-04-29

## 2022-04-29 RX ORDER — HEPARIN SODIUM (PORCINE) LOCK FLUSH IV SOLN 100 UNIT/ML 100 UNIT/ML
500 SOLUTION INTRAVENOUS EVERY 8 HOURS
Status: DISCONTINUED | OUTPATIENT
Start: 2022-04-29 | End: 2022-04-29 | Stop reason: HOSPADM

## 2022-04-29 RX ADMIN — HEPARIN 500 UNITS: 100 SYRINGE at 08:57

## 2022-04-29 NOTE — PROGRESS NOTES
Patients port accessed using non-coring, 19 gauge, 3/4 inch needle. Port accessed per facility protocol.   Hand hygiene performed: yes   Mask donned by caregiver: yes   Site prepped with CHG: yes   Labs drawn: no   Dressing applied using aseptic technique: yes   Port flushed easily, blood return noted.  No signs and symptoms of infection or infiltration.  Port flushed 20 mLs Normal Saline then Heparin 5mLs of 100 unit/mL.  Needle removed, small dressing applied.  Patient discharged with no complaints.

## 2022-06-09 ENCOUNTER — INFUSION THERAPY VISIT (OUTPATIENT)
Dept: INFUSION THERAPY | Facility: OTHER | Age: 82
End: 2022-06-09
Attending: NURSE PRACTITIONER
Payer: COMMERCIAL

## 2022-06-09 DIAGNOSIS — C91.10 CHRONIC LYMPHOCYTIC LEUKEMIA (H): Primary | ICD-10-CM

## 2022-06-09 PROCEDURE — 250N000011 HC RX IP 250 OP 636: Performed by: NURSE PRACTITIONER

## 2022-06-09 PROCEDURE — 96523 IRRIG DRUG DELIVERY DEVICE: CPT

## 2022-06-09 RX ORDER — HEPARIN SODIUM (PORCINE) LOCK FLUSH IV SOLN 100 UNIT/ML 100 UNIT/ML
500 SOLUTION INTRAVENOUS EVERY 8 HOURS
Status: CANCELLED
Start: 2022-06-09

## 2022-06-09 RX ORDER — HEPARIN SODIUM (PORCINE) LOCK FLUSH IV SOLN 100 UNIT/ML 100 UNIT/ML
500 SOLUTION INTRAVENOUS EVERY 8 HOURS
Status: DISCONTINUED | OUTPATIENT
Start: 2022-06-09 | End: 2022-06-09 | Stop reason: HOSPADM

## 2022-06-09 RX ADMIN — HEPARIN 500 UNITS: 100 SYRINGE at 08:29

## 2022-06-09 NOTE — PROGRESS NOTES
Patient's port accessed, good brisk blood return.  .  Patient port assessed per facility policy.  Patient next appointment already scheduled for next appointment. Patient discharged in care of self.

## 2022-07-05 ENCOUNTER — NURSE TRIAGE (OUTPATIENT)
Dept: FAMILY MEDICINE | Facility: OTHER | Age: 82
End: 2022-07-05

## 2022-07-05 ENCOUNTER — HOSPITAL ENCOUNTER (EMERGENCY)
Facility: HOSPITAL | Age: 82
Discharge: HOME | End: 2022-07-05
Attending: NURSE PRACTITIONER | Admitting: NURSE PRACTITIONER
Payer: COMMERCIAL

## 2022-07-05 VITALS
HEART RATE: 98 BPM | SYSTOLIC BLOOD PRESSURE: 160 MMHG | RESPIRATION RATE: 16 BRPM | TEMPERATURE: 98.5 F | DIASTOLIC BLOOD PRESSURE: 85 MMHG | OXYGEN SATURATION: 97 %

## 2022-07-05 DIAGNOSIS — H10.31 ACUTE CONJUNCTIVITIS OF RIGHT EYE: Primary | ICD-10-CM

## 2022-07-05 DIAGNOSIS — J02.9 ACUTE PHARYNGITIS, UNSPECIFIED ETIOLOGY: ICD-10-CM

## 2022-07-05 DIAGNOSIS — J02.9 ACUTE PHARYNGITIS: ICD-10-CM

## 2022-07-05 LAB — GROUP A STREP BY PCR: NOT DETECTED

## 2022-07-05 PROCEDURE — 87651 STREP A DNA AMP PROBE: CPT | Performed by: NURSE PRACTITIONER

## 2022-07-05 PROCEDURE — 99213 OFFICE O/P EST LOW 20 MIN: CPT | Performed by: NURSE PRACTITIONER

## 2022-07-05 PROCEDURE — G0463 HOSPITAL OUTPT CLINIC VISIT: HCPCS

## 2022-07-05 RX ORDER — AMOXICILLIN 875 MG
875 TABLET ORAL 2 TIMES DAILY
Qty: 14 TABLET | Refills: 0 | Status: SHIPPED | OUTPATIENT
Start: 2022-07-05 | End: 2022-07-12

## 2022-07-05 RX ORDER — GENTAMICIN SULFATE 3 MG/ML
1-2 SOLUTION/ DROPS OPHTHALMIC EVERY 4 HOURS
Qty: 5 ML | Refills: 0 | Status: SHIPPED | OUTPATIENT
Start: 2022-07-05 | End: 2022-07-12

## 2022-07-05 ASSESSMENT — ENCOUNTER SYMPTOMS
EYE DISCHARGE: 1
SHORTNESS OF BREATH: 0
FEVER: 1
APPETITE CHANGE: 0
EYE REDNESS: 1
COUGH: 1
EYE ITCHING: 1
CHILLS: 0
SORE THROAT: 1

## 2022-07-05 ASSESSMENT — VISUAL ACUITY: OU: 1

## 2022-07-05 NOTE — ED PROVIDER NOTES
History     Chief Complaint   Patient presents with     Pharyngitis     Conjunctivitis     HPI  Jennifer Barajas is a 81 year old female who presents ambulatory to urgent care for evaluation of possible pinkeye and sore throat.  Patient tells me she developed a sore throat 5 to 6 days ago that has been coming and going.  This is accompanied by a slight dry cough and low-grade fever of up to 100.8  F.  No chest pain or shortness of breath.  Painful to swallow.  Currently denies sore throat but notes yesterday she could feel on the right side of her throat.  Negative home COVID-19 test.  She declines COVID-19 testing today.  She has been taking Tylenol for the pain.    Yesterday, she woke up with goopy drainage to her right eye along with redness.  Eye is itchy.  No changes to her vision.  She does wear eyeglasses.  Patient feels that her eye is getting worse.    Allergies:  Allergies   Allergen Reactions     Sulfa Drugs Rash     Rituxan [Rituximab]      Simvastatin Other (See Comments)     Takes pravastatin at home  Zocor - myalgia       Problem List:    Patient Active Problem List    Diagnosis Date Noted     Non-toxic multinodular goiter 11/08/2016     Priority: Medium     Mixed hyperlipidemia 11/08/2016     Priority: Medium     Neutropenia associated with infection (H) 01/30/2015     Priority: Medium     N status post chemotherapy       Pneumonia involving right lung 01/30/2015     Priority: Medium     Anemia associated with chemotherapy 01/30/2015     Priority: Medium     Febrile neutropenia (H) 01/30/2015     Priority: Medium     Diverticulitis of colon 02/04/2014     Priority: Medium     Advanced care planning/counseling discussion 12/17/2012     Priority: Medium     Chronic lymphocytic leukemia (H) 02/28/2012     Priority: Medium     updating diagnosis code for icd10 cutover       Diverticulosis of large intestine 11/29/2010     Priority: Medium     Problem list name updated by automated process. Provider to  review       Osteoporosis 11/29/2010     Priority: Medium     Problem list name updated by automated process. Provider to review          Past Medical History:    Past Medical History:   Diagnosis Date     Arthritis      Cancer (H)      Chronic lymphoid leukemia, 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 2/28/2012     Diverticulosis of colon (without mention of hemorrhage) 11/29/2010     Family history of ischemic heart disease 11/29/2010     Family history of malignant neoplasm of gastrointestinal tract 10/14/2002     History of blood transfusion      Non-toxic multinodular goiter 11/8/2016     Osteoporosis, unspecified 11/29/2010     Other and unspecified hyperlipidemia 11/28/2011     Viral warts, unspecified 11/28/2011       Past Surgical History:    Past Surgical History:   Procedure Laterality Date     ------------OTHER-------------      elbow repair     COLONOSCOPY  01/12/2010    repeat in 2015     COLONOSCOPY      SEVERAL COLONOSCOPIES - ALL BEEN NORMAL - REFUSES ANY MORE      COLONOSCOPY       INSERT PORT VASCULAR ACCESS N/A 1/5/2015    Procedure: INSERT PORT VASCULAR ACCESS;  Surgeon: Linda Oliver MD;  Location: [facility]     IR PORT CHECK LEFT  5/20/2020     ORTHOPEDIC SURGERY  1993    Left elbow repair       Family History:    Family History   Problem Relation Age of Onset     Cancer Mother 97        colon cancer - cause of death     Colon Cancer Mother      Cancer Father      Colon Cancer Father      Diabetes Brother      Obesity Brother      Breast Cancer Daughter      Hyperlipidemia Daughter      Thyroid Disease Daughter      Hypertension Son      Hyperlipidemia Son      Thyroid Disease Cousin      Hyperlipidemia Son      Coronary Artery Disease No family hx of      Cerebrovascular Disease No family hx of      Prostate Cancer No family hx of      Other Cancer No family hx of      Anesthesia Reaction No family hx of      Asthma No family hx of      Genetic Disorder No family hx of         Social History:  Marital Status:   [4]  Social History     Tobacco Use     Smoking status: Never Smoker     Smokeless tobacco: Never Used   Substance Use Topics     Alcohol use: No     Drug use: No        Medications:    alendronate (FOSAMAX) 70 MG tablet  amoxicillin (AMOXIL) 875 MG tablet  gentamicin (GARAMYCIN) 0.3 % ophthalmic solution  Ascorbic Acid (VITAMIN C PO)  Cholecalciferol (VITAMIN D3 PO)  lidocaine-prilocaine (EMLA) cream  Multiple Vitamins-Minerals (CENTRUM SILVER 50+WOMEN PO)          Review of Systems   Constitutional: Positive for fever. Negative for appetite change and chills.   HENT: Positive for sore throat.    Eyes: Positive for discharge, redness and itching.   Respiratory: Positive for cough. Negative for shortness of breath.    Cardiovascular: Negative for chest pain.   All other systems reviewed and are negative.      Physical Exam   BP: 160/85  Pulse: 98  Temp: 98.5  F (36.9  C)  Resp: 16  SpO2: 97 %      Physical Exam  Vitals and nursing note reviewed.   Constitutional:       Appearance: She is well-developed. She is not ill-appearing or toxic-appearing.   HENT:      Head: Atraumatic.      Right Ear: Tympanic membrane and ear canal normal.      Left Ear: Tympanic membrane and ear canal normal.      Mouth/Throat:      Mouth: Mucous membranes are moist.      Pharynx: Posterior oropharyngeal erythema present. No pharyngeal swelling, oropharyngeal exudate or uvula swelling.      Tonsils: No tonsillar exudate or tonsillar abscesses. 0 on the right. 0 on the left.   Eyes:      General: Vision grossly intact. No visual field deficit.        Right eye: Discharge present. No foreign body or hordeolum.      Extraocular Movements: Extraocular movements intact.      Right eye: Normal extraocular motion and no nystagmus.      Conjunctiva/sclera:      Right eye: Right conjunctiva is injected. No hemorrhage.     Pupils: Pupils are equal, round, and reactive to light.   Cardiovascular:       Rate and Rhythm: Normal rate and regular rhythm.      Heart sounds: Normal heart sounds.   Pulmonary:      Effort: Pulmonary effort is normal. No respiratory distress.      Breath sounds: Normal breath sounds. No rhonchi or rales.   Musculoskeletal:      Cervical back: Neck supple.   Skin:     General: Skin is warm and dry.   Neurological:      Mental Status: She is alert and oriented to person, place, and time.         ED Course                 Procedures           Results for orders placed or performed during the hospital encounter of 07/05/22 (from the past 24 hour(s))   Group A Streptococcus PCR Throat Swab    Specimen: Throat; Swab   Result Value Ref Range    Group A strep by PCR Not Detected Not Detected    Narrative    The Xpert Xpress Strep A test, performed on the [employer], is a rapid, qualitative in vitro diagnostic test for the detection of Streptococcus pyogenes (Group A ß-hemolytic Streptococcus, Strep A) in throat swab specimens from patients with signs and symptoms of pharyngitis. The Xpert Xpress Strep A test can be used as an aid in the diagnosis of Group A Streptococcal pharyngitis. The assay is not intended to monitor treatment for Group A Streptococcus infections. The Xpert Xpress Strep A test utilizes an automated real-time polymerase chain reaction (PCR) to detect Streptococcus pyogenes DNA.       Medications - No data to display    Assessments & Plan (with Medical Decision Making)     I have reviewed the nursing notes.    81-year-old female that presented for evaluation of sore throat and right eye irritation and redness.  Findings as noted above.    Patient tested negative for strep.  She declined COVID-19 testing today.  Discussed all results with patient.  Opted to treat for possible bacterial pharyngitis based on symptoms with amoxicillin.  Gentamicin as prescribed for acute conjunctivitis of right eye.  Recommended pushing fluids and Tylenol/ibuprofen as needed for  pain or fever.  Follow-up with PCP if no improvement in symptoms.  Return to ED/UC for worsening concerning symptoms.    I have reviewed the findings, diagnosis, plan and need for follow up with the patient.  This document was prepared using a combination of typing and voice generated software.  While every attempt was made for accuracy, spelling and grammatical errors may exist.    Discharge Medication List as of 7/5/2022 10:14 AM      START taking these medications    Details   amoxicillin (AMOXIL) 875 MG tablet Take 1 tablet (875 mg) by mouth 2 times daily for 7 days, Disp-14 tablet, R-0, E-Prescribe      gentamicin (GARAMYCIN) 0.3 % ophthalmic solution Place 1-2 drops into the right eye every 4 hours for 7 days, Disp-5 mL, R-0, E-Prescribe             Final diagnoses:   Acute conjunctivitis of right eye   Acute pharyngitis       7/5/2022   [facility]     Mpofu, Prudence, CNP  07/05/22 [id number]

## 2022-07-05 NOTE — TELEPHONE ENCOUNTER
"Protocol advises patient to be seen within 24 hours for possible pink eye that started yesterday. No available appointment today in [facility]. Patient not wanting to go to [facility]. PCP out. Offered appointment for tomorrow. Patient stated she will go to .     Reason for Disposition    [1] Eye with yellow/green discharge or eyelashes stick together AND [2] NO PCP standing order to call in antibiotic eye drops    Additional Information    Negative: Eye exposure to chemical or fumes    Negative: Redness of white of eye (sclera), but no pus or only a small amount of brief pus    Negative: SEVERE eye pain (e.g., excruciating)    Negative: [1] Eyelids are very swollen (shut or almost) AND [2] fever    Negative: [1] Eyelid (outer) is very red (or tender to touch) AND [2] fever    Negative: Patient sounds very sick or weak to the triager    Negative: MODERATE eye pain (e.g., interferes with normal activities)    Negative: Fever > 104 F (40 C)    Negative: Cloudy spot or sore seen on the cornea (clear part of the eye)    Negative: Blurred vision    Negative: Eye is very swollen (shut or almost)    Negative: [1] MILD eye pain or discomfort AND [2] wears contacts    Negative: Eyelid is red and painful (or tender to touch)    Negative: Discharge from penis    Negative: New or abnormal vaginal discharge    Negative: Fever present > 3 days (72 hours)    Negative: [1] Lots of yellow or green nasal discharge AND [2] present now AND [3] fever    Negative: Weak immune system (e.g., HIV positive, cancer chemo, splenectomy, organ transplant, chronic steroids)    Answer Assessment - Initial Assessment Questions  1. EYE DISCHARGE: \"Is the discharge in one or both eyes?\" \"What color is it?\" \"How much is there?\" \"When did the discharge start?\"       Right eye. Outside is red and inside is red. Almost swollen shut. Started yesterday morning and is worsening.   2. REDNESS OF SCLERA: \"Is the redness in one or both eyes?\" " "\"When did the redness start?\"       Right eye only   3. EYELIDS: \"Are the eyelids red or swollen?\" If so, ask: \"How much?\"       Yes. Almost swollen shut  4. VISION: \"Is there any difficulty seeing clearly?\"       No only when getting up in the morning there is a like a film over it.   5. PAIN: \"Is there any pain? If so, ask: \"How bad is it?\" (Scale 1-10; or mild, moderate, severe)     - MILD (1-3): doesn't interfere with normal activities      - MODERATE (4-7): interferes with normal activities or awakens from sleep     - SEVERE (8-10): excruciating pain, unable to do any normal activities        No, but is itching  6. CONTACT LENS: \"Do you wear contacts?\"      no  7. OTHER SYMPTOMS: \"Do you have any other symptoms?\" (e.g., fever, runny nose, cough)      Sore throat, fever, hoarse, sneezing, yellow mucous in nose  8. PREGNANCY: \"Is there any chance you are pregnant?\" \"When was your last menstrual period?\"      no    Protocols used: EYE - PUS OR [id number]      "

## 2022-07-05 NOTE — ED TRIAGE NOTES
Pt presents with possible pink eye, sore throat. Pt took tylenol this morning. Pt has had low grade fever. Pt took home covid test that was negative.

## 2022-07-05 NOTE — DISCHARGE INSTRUCTIONS
Apply eyedrops to the affected eye as prescribed.  Take the oral antibiotic as prescribed.    Drink plenty of fluids.  Take Tylenol ibuprofen as needed for pain or fever.    Follow-up with your doctor if no improvement in symptoms.   2 2

## 2022-07-05 NOTE — ED TRIAGE NOTES
Patient presents with complaints of sore throat since last thursday.  Notes this AM woke up with a goopy/pink right eye.  Has taken two at home covid tests and they were negative.

## 2022-07-08 ENCOUNTER — TELEPHONE (OUTPATIENT)
Dept: FAMILY MEDICINE | Facility: OTHER | Age: 82
End: 2022-07-08

## 2022-07-08 NOTE — TELEPHONE ENCOUNTER
Spoke with primary nurse for approval to overbook pt.  Pt scheduled for Tuesday 7/12 @ 8:15 am  Called pt's daughter with booking information   No further concerns at the end of this call            Request for possible overbook.  Pt's daughter requesting pt be seen for ER f/u for conjunctivitis  Writer offered ranulfo't today with covering Provider d/t worsening symptoms  Daughter declined, requesting message being sent for possible overbook next week with PCP      Last ED visit date 7/5/22  Reason for ED visit: conjunctivitis of R eye  New worsening symptoms: L eye becoming red, irritated  RX given at ED visit:  Gentamicin 0.3% ophthalmic solution

## 2022-07-12 ENCOUNTER — OFFICE VISIT (OUTPATIENT)
Dept: FAMILY MEDICINE | Facility: OTHER | Age: 82
End: 2022-07-12
Attending: FAMILY MEDICINE
Payer: COMMERCIAL

## 2022-07-12 VITALS
SYSTOLIC BLOOD PRESSURE: 140 MMHG | HEART RATE: 91 BPM | TEMPERATURE: 97 F | DIASTOLIC BLOOD PRESSURE: 60 MMHG | BODY MASS INDEX: 19.83 KG/M2 | RESPIRATION RATE: 16 BRPM | OXYGEN SATURATION: 96 % | WEIGHT: 98.2 LBS

## 2022-07-12 DIAGNOSIS — H10.13 ALLERGIC CONJUNCTIVITIS, BILATERAL: ICD-10-CM

## 2022-07-12 DIAGNOSIS — J06.9 UPPER RESPIRATORY TRACT INFECTION, UNSPECIFIED TYPE: Primary | ICD-10-CM

## 2022-07-12 PROCEDURE — G0463 HOSPITAL OUTPT CLINIC VISIT: HCPCS

## 2022-07-12 PROCEDURE — 99213 OFFICE O/P EST LOW 20 MIN: CPT | Performed by: FAMILY MEDICINE

## 2022-07-12 RX ORDER — GENTAMICIN SULFATE 3 MG/ML
1-2 SOLUTION/ DROPS OPHTHALMIC EVERY 4 HOURS
Qty: 5 ML | Refills: 0 | Status: SHIPPED | OUTPATIENT
Start: 2022-07-12 | End: 2022-07-29

## 2022-07-12 ASSESSMENT — PATIENT HEALTH QUESTIONNAIRE - PHQ9: SUM OF ALL RESPONSES TO PHQ QUESTIONS 1-9: 0

## 2022-07-12 ASSESSMENT — PAIN SCALES - GENERAL: PAINLEVEL: NO PAIN (0)

## 2022-07-12 NOTE — PROGRESS NOTES
Assessment & Plan     Upper respiratory tract infection, unspecified type  Overall better. Finish abx and add Claritin OTC    Allergic conjunctivitis, bilateral  Will add back another 3 days of drops. Symptomatic treatment was discussed along when patient should call and/or come back into the clinic or go to ER/Urgent care. All questions answered.   - gentamicin (GARAMYCIN) 0.3 % ophthalmic solution; Place 1-2 drops into both eyes every 4 hours                 No follow-ups on file.    Zheng De La Rosa MD  [facility] - TRINA Rodriguez is a 81 year old, presenting for the following health issues:  ER F/U      HPI     ED/UC Followup:    Facility:  [facility]  Date of visit: 7/5/2022  Reason for visit: pharyngitis, conjunctivitis  Current Status: still has matting in the corner of left eye, coughing spells when trying to talk, also has a dry cough when going to bed at night    ER notes reviewed   Overall better but still some lingering sx  Has chronic PND/ seasonal allergies   On and off ST.   Not on any antihist   Has one day of abx left     Review of Systems   Constitutional, HEENT, cardiovascular, pulmonary, gi and gu systems are negative, except as otherwise noted.      Objective    BP (!) 140/60 (BP Location: Right arm, Patient Position: Sitting, Cuff Size: Adult Regular)   Pulse 91   Temp 97  F (36.1  C) (Tympanic)   Resp 16   Wt 44.5 kg (98 lb 3.2 oz)   SpO2 96%   BMI 19.83 kg/m    Body mass index is 19.83 kg/m .  Physical Exam   GENERAL: healthy, alert and no distress  EYES: Eyes grossly normal to inspection, PERRL and conjunctivae and sclerae normal-- some mattering still noted   HENT: ear canals and TM's normal, nose and mouth without ulcers or lesions  NECK: no adenopathy, no asymmetry, masses, or scars and thyroid normal to palpation  RESP: lungs clear to auscultation - no rales, rhonchi or wheezes  CV: regular rate and rhythm, normal S1 S2, no S3 or S4, no murmur, click or  rub, no peripheral edema and peripheral pulses strong                    .  ..

## 2022-07-12 NOTE — NURSING NOTE
"Chief Complaint   Patient presents with     ER F/U       Initial BP (!) 140/60 (BP Location: Right arm, Patient Position: Sitting, Cuff Size: Adult Regular)   Pulse 91   Temp 97  F (36.1  C) (Tympanic)   Resp 16   Wt 44.5 kg (98 lb 3.2 oz)   SpO2 96%   BMI 19.83 kg/m   Estimated body mass index is 19.83 kg/m  as calculated from the following:    Height as of 2/4/22: 1.499 m (4' 11\").    Weight as of this encounter: 44.5 kg (98 lb 3.2 oz).  Medication Reconciliation: complete  William Fortune LPN  "

## 2022-07-22 ENCOUNTER — INFUSION THERAPY VISIT (OUTPATIENT)
Dept: INFUSION THERAPY | Facility: OTHER | Age: 82
End: 2022-07-22
Attending: NURSE PRACTITIONER
Payer: COMMERCIAL

## 2022-07-22 DIAGNOSIS — C91.10 CHRONIC LYMPHOCYTIC LEUKEMIA (H): Primary | ICD-10-CM

## 2022-07-22 PROCEDURE — 250N000011 HC RX IP 250 OP 636: Performed by: NURSE PRACTITIONER

## 2022-07-22 PROCEDURE — 96523 IRRIG DRUG DELIVERY DEVICE: CPT

## 2022-07-22 RX ORDER — HEPARIN SODIUM (PORCINE) LOCK FLUSH IV SOLN 100 UNIT/ML 100 UNIT/ML
500 SOLUTION INTRAVENOUS EVERY 8 HOURS
Status: CANCELLED
Start: 2022-07-22

## 2022-07-22 RX ORDER — HEPARIN SODIUM (PORCINE) LOCK FLUSH IV SOLN 100 UNIT/ML 100 UNIT/ML
500 SOLUTION INTRAVENOUS EVERY 8 HOURS
Status: DISCONTINUED | OUTPATIENT
Start: 2022-07-22 | End: 2022-07-22 | Stop reason: HOSPADM

## 2022-07-22 RX ADMIN — HEPARIN 500 UNITS: 100 SYRINGE at 08:56

## 2022-07-22 NOTE — PROGRESS NOTES
Patients port accessed using non-coring, 19 gauge, 3/4 needle, per facility protocol.     Hand hygiene performed: yes   Mask donned by caregiver: yes  Site prepped with CHG: yes   Labs drawn: yes   Dressing applied using aseptic technique: yes     Port flushed easily, without resistance. Flushed with 10 cc's normal saline.   Immediate blood return noted. 10 cc blood discarded.   Port flushed per orders/MAR. Needle removed intact, sterile dressing applied.  Slight pressure applied for 30 seconds.   Patient tolerated port flush well, denies pain nor discomfort at this time. Patient instructed to leave dressing intact for a minimum of one hour, and to call with questions or concerns.  Patient states understanding and is in agreement with this plan. Patient discharged.

## 2022-07-29 ENCOUNTER — OFFICE VISIT (OUTPATIENT)
Dept: FAMILY MEDICINE | Facility: OTHER | Age: 82
End: 2022-07-29
Attending: FAMILY MEDICINE
Payer: COMMERCIAL

## 2022-07-29 ENCOUNTER — LAB (OUTPATIENT)
Dept: LAB | Facility: OTHER | Age: 82
End: 2022-07-29
Attending: FAMILY MEDICINE
Payer: COMMERCIAL

## 2022-07-29 VITALS
TEMPERATURE: 97.5 F | HEART RATE: 92 BPM | SYSTOLIC BLOOD PRESSURE: 136 MMHG | BODY MASS INDEX: 19.51 KG/M2 | OXYGEN SATURATION: 98 % | DIASTOLIC BLOOD PRESSURE: 60 MMHG | WEIGHT: 96.6 LBS | RESPIRATION RATE: 18 BRPM

## 2022-07-29 DIAGNOSIS — M81.0 AGE-RELATED OSTEOPOROSIS WITHOUT CURRENT PATHOLOGICAL FRACTURE: ICD-10-CM

## 2022-07-29 DIAGNOSIS — D64.81 ANEMIA ASSOCIATED WITH CHEMOTHERAPY: ICD-10-CM

## 2022-07-29 DIAGNOSIS — E78.2 MIXED HYPERLIPIDEMIA: ICD-10-CM

## 2022-07-29 DIAGNOSIS — T45.1X5A ANEMIA ASSOCIATED WITH CHEMOTHERAPY: ICD-10-CM

## 2022-07-29 DIAGNOSIS — C91.10 CHRONIC LYMPHOCYTIC LEUKEMIA (H): Primary | ICD-10-CM

## 2022-07-29 DIAGNOSIS — C91.10 CHRONIC LYMPHOCYTIC LEUKEMIA (H): ICD-10-CM

## 2022-07-29 LAB
ALBUMIN SERPL-MCNC: 4 G/DL (ref 3.4–5)
ALP SERPL-CCNC: 112 U/L (ref 40–150)
ALT SERPL W P-5'-P-CCNC: 29 U/L (ref 0–50)
ANION GAP SERPL CALCULATED.3IONS-SCNC: 7 MMOL/L (ref 3–14)
AST SERPL W P-5'-P-CCNC: 34 U/L (ref 0–45)
BASOPHILS # BLD AUTO: 0 10E3/UL (ref 0–0.2)
BASOPHILS NFR BLD AUTO: 0 %
BILIRUB SERPL-MCNC: 0.6 MG/DL (ref 0.2–1.3)
BUN SERPL-MCNC: 13 MG/DL (ref 7–30)
CALCIUM SERPL-MCNC: 10.1 MG/DL (ref 8.5–10.1)
CHLORIDE BLD-SCNC: 102 MMOL/L (ref 94–109)
CHOLEST SERPL-MCNC: 284 MG/DL
CO2 SERPL-SCNC: 29 MMOL/L (ref 20–32)
CREAT SERPL-MCNC: 0.74 MG/DL (ref 0.52–1.04)
EOSINOPHIL # BLD AUTO: 0.1 10E3/UL (ref 0–0.7)
EOSINOPHIL NFR BLD AUTO: 1 %
ERYTHROCYTE [DISTWIDTH] IN BLOOD BY AUTOMATED COUNT: 13.9 % (ref 10–15)
FASTING STATUS PATIENT QL REPORTED: NO
GFR SERPL CREATININE-BSD FRML MDRD: 82 ML/MIN/1.73M2
GLUCOSE BLD-MCNC: 104 MG/DL (ref 70–99)
HCT VFR BLD AUTO: 42.1 % (ref 35–47)
HDLC SERPL-MCNC: 74 MG/DL
HGB BLD-MCNC: 14.1 G/DL (ref 11.7–15.7)
IMM GRANULOCYTES # BLD: 0 10E3/UL
IMM GRANULOCYTES NFR BLD: 0 %
LDLC SERPL CALC-MCNC: 163 MG/DL
LYMPHOCYTES # BLD AUTO: 2.1 10E3/UL (ref 0.8–5.3)
LYMPHOCYTES NFR BLD AUTO: 20 %
MCH RBC QN AUTO: 30.2 PG (ref 26.5–33)
MCHC RBC AUTO-ENTMCNC: 33.5 G/DL (ref 31.5–36.5)
MCV RBC AUTO: 90 FL (ref 78–100)
MONOCYTES # BLD AUTO: 0.7 10E3/UL (ref 0–1.3)
MONOCYTES NFR BLD AUTO: 7 %
NEUTROPHILS # BLD AUTO: 7.7 10E3/UL (ref 1.6–8.3)
NEUTROPHILS NFR BLD AUTO: 72 %
NONHDLC SERPL-MCNC: 210 MG/DL
NRBC # BLD AUTO: 0 10E3/UL
NRBC BLD AUTO-RTO: 0 /100
PLATELET # BLD AUTO: 288 10E3/UL (ref 150–450)
POTASSIUM BLD-SCNC: 3.6 MMOL/L (ref 3.4–5.3)
PROT SERPL-MCNC: 8.2 G/DL (ref 6.8–8.8)
RBC # BLD AUTO: 4.67 10E6/UL (ref 3.8–5.2)
SODIUM SERPL-SCNC: 138 MMOL/L (ref 133–144)
TRIGL SERPL-MCNC: 235 MG/DL
TSH SERPL DL<=0.005 MIU/L-ACNC: 3.42 MU/L (ref 0.4–4)
WBC # BLD AUTO: 10.6 10E3/UL (ref 4–11)

## 2022-07-29 PROCEDURE — 82040 ASSAY OF SERUM ALBUMIN: CPT | Mod: ZL

## 2022-07-29 PROCEDURE — 36415 COLL VENOUS BLD VENIPUNCTURE: CPT | Mod: ZL

## 2022-07-29 PROCEDURE — 80061 LIPID PANEL: CPT | Mod: ZL

## 2022-07-29 PROCEDURE — 85025 COMPLETE CBC W/AUTO DIFF WBC: CPT | Mod: ZL

## 2022-07-29 PROCEDURE — G0463 HOSPITAL OUTPT CLINIC VISIT: HCPCS

## 2022-07-29 PROCEDURE — 84443 ASSAY THYROID STIM HORMONE: CPT | Mod: ZL

## 2022-07-29 PROCEDURE — 99214 OFFICE O/P EST MOD 30 MIN: CPT | Performed by: FAMILY MEDICINE

## 2022-07-29 PROCEDURE — 80053 COMPREHEN METABOLIC PANEL: CPT | Mod: ZL

## 2022-07-29 ASSESSMENT — ENCOUNTER SYMPTOMS
HEMATURIA: 0
ABDOMINAL PAIN: 0
HEMATOCHEZIA: 0
EYE PAIN: 0
SORE THROAT: 0
COUGH: 1
SHORTNESS OF BREATH: 0
NAUSEA: 0
DYSURIA: 0
HEARTBURN: 0
FEVER: 0
NERVOUS/ANXIOUS: 0
WEAKNESS: 0
JOINT SWELLING: 0
DIARRHEA: 0
DIZZINESS: 0
CONSTIPATION: 0
HEADACHES: 0
PARESTHESIAS: 0
CHILLS: 0
PALPITATIONS: 0
MYALGIAS: 0
FREQUENCY: 0
BREAST MASS: 0
ARTHRALGIAS: 0

## 2022-07-29 ASSESSMENT — ACTIVITIES OF DAILY LIVING (ADL): CURRENT_FUNCTION: NO ASSISTANCE NEEDED

## 2022-07-29 ASSESSMENT — PAIN SCALES - GENERAL: PAINLEVEL: NO PAIN (0)

## 2022-07-29 NOTE — NURSING NOTE
"Chief Complaint   Patient presents with     Physical       Initial /60 (BP Location: Right arm, Patient Position: Sitting, Cuff Size: Adult Regular)   Pulse 92   Temp 97.5  F (36.4  C) (Tympanic)   Resp 18   Wt 43.8 kg (96 lb 9.6 oz)   SpO2 98%   BMI 19.51 kg/m   Estimated body mass index is 19.51 kg/m  as calculated from the following:    Height as of 2/4/22: 1.499 m (4' 11\").    Weight as of this encounter: 43.8 kg (96 lb 9.6 oz).  Medication Reconciliation: complete  William Fortune LPN  "

## 2022-07-29 NOTE — PROGRESS NOTES
"SUBJECTIVE:   Jennifer Barajas is a 81 year old female who presents for Preventive Visit.      Patient has been advised of split billing requirements and indicates understanding: Yes  Are you in the first 12 months of your Medicare coverage?  No    Healthy Habits:     In general, how would you rate your overall health?  Good    Frequency of exercise:  None    Do you usually eat at least 4 servings of fruit and vegetables a day, include whole grains    & fiber and avoid regularly eating high fat or \"junk\" foods?  No    Taking medications regularly:  Yes    Medication side effects:  None    Ability to successfully perform activities of daily living:  No assistance needed    Home Safety:  Lack of grab bars in the bathroom    Hearing Impairment:  Feel that people are mumbling or not speaking clearly, need to ask people to speak up or repeat themselves and difficulty understanding soft or whispered speech    In the past 6 months, have you been bothered by leaking of urine?  No    In general, how would you rate your overall mental or emotional health?  Good      PHQ-2 Total Score: 0    Additional concerns today:  No    Do you feel safe in your environment? Yes    Have you ever done Advance Care Planning? (For example, a Health Directive, POLST, or a discussion with a medical provider or your loved ones about your wishes): Yes, advance care planning is on file.       Fall risk  Fallen 2 or more times in the past year?: No  Any fall with injury in the past year?: No    Cognitive Screening   1) Repeat 3 items (Leader, Season, Table)    2) Clock draw: NORMAL  3) 3 item recall: Recalls 3 objects  Results: 3 items recalled: COGNITIVE IMPAIRMENT LESS LIKELY    Mini-CogTM Copyright STEPHANIE Padilla. Licensed by the author for use in [facility]; reprinted with permission (kodi@.[email or web address]). All rights reserved.      Do you have sleep apnea, excessive snoring or daytime drowsiness?: no    Reviewed and updated as needed this visit by " clinical staff   Tobacco  Allergies  Meds   Med Hx  Surg Hx  Fam Hx  Soc Hx          Reviewed and updated as needed this visit by Provider                   Social History     Tobacco Use     Smoking status: Never Smoker     Smokeless tobacco: Never Used   Substance Use Topics     Alcohol use: No     If you drink alcohol do you typically have >3 drinks per day or >7 drinks per week? No    Alcohol Use 7/29/2022   Prescreen: >3 drinks/day or >7 drinks/week? No           Hyperlipidemia Follow-Up      Are you regularly taking any medication or supplement to lower your cholesterol?   No    Are you having muscle aches or other side effects that you think could be caused by your cholesterol lowering medication?  No      Current providers sharing in care for this patient include:   Patient Care Team:  Zheng De La Rosa MD as PCP - General  Zheng De La Rosa MD as Assigned PCP  Yaneli Gray NP as Assigned Cancer Care Provider    The following health maintenance items are reviewed in Epic and correct as of today:  Health Maintenance Due   Topic Date Due     HEPATITIS C SCREENING  Never done     COLORECTAL CANCER SCREENING  01/12/2015     Pneumococcal Vaccine: 65+ Years (4 - PPSV23 or PCV20) 11/08/2017     MEDICARE ANNUAL WELLNESS VISIT  11/29/2020     COVID-19 Vaccine (2 - Kath risk series) 05/07/2021     Lab work is in process        Pertinent mammograms are reviewed under the imaging tab.    Review of Systems   Constitutional: Negative for chills and fever.   HENT: Negative for congestion, ear pain, hearing loss and sore throat.    Eyes: Negative for pain and visual disturbance.   Respiratory: Positive for cough. Negative for shortness of breath.    Cardiovascular: Negative for chest pain, palpitations and peripheral edema.   Gastrointestinal: Negative for abdominal pain, constipation, diarrhea, heartburn, hematochezia and nausea.   Breasts:  Negative for tenderness, breast mass and discharge.  "  Genitourinary: Negative for dysuria, frequency, genital sores, hematuria, pelvic pain, urgency, vaginal bleeding and vaginal discharge.   Musculoskeletal: Negative for arthralgias, joint swelling and myalgias.   Skin: Negative for rash.   Neurological: Negative for dizziness, weakness, headaches and paresthesias.   Psychiatric/Behavioral: Negative for mood changes. The patient is not nervous/anxious.      Constitutional, HEENT, cardiovascular, pulmonary, GI, , musculoskeletal, neuro, skin, endocrine and psych systems are negative, except as otherwise noted.    OBJECTIVE:   /60 (BP Location: Right arm, Patient Position: Sitting, Cuff Size: Adult Regular)   Pulse 92   Temp 97.5  F (36.4  C) (Tympanic)   Resp 18   Wt 43.8 kg (96 lb 9.6 oz)   SpO2 98%   BMI 19.51 kg/m   Estimated body mass index is 19.51 kg/m  as calculated from the following:    Height as of 2/4/22: 1.499 m (4' 11\").    Weight as of this encounter: 43.8 kg (96 lb 9.6 oz).  Physical Exam  GENERAL: healthy, alert and no distress  EYES: Eyes grossly normal to inspection, PERRL and conjunctivae and sclerae normal  HENT: ear canals and TM's normal, nose and mouth without ulcers or lesions  NECK: no adenopathy, no asymmetry, masses, or scars and thyroid normal to palpation  RESP: lungs clear to auscultation - no rales, rhonchi or wheezes  BREAST: normal without masses, tenderness or nipple discharge and no palpable axillary masses or adenopathy  CV: regular rate and rhythm, normal S1 S2, no S3 or S4, no murmur, click or rub, no peripheral edema and peripheral pulses strong  ABDOMEN: soft, nontender, no hepatosplenomegaly, no masses and bowel sounds normal  MS: no gross musculoskeletal defects noted, no edema  SKIN: no suspicious lesions or rashes  NEURO: Normal strength and tone, mentation intact and speech normal  PSYCH: mentation appears normal, affect normal/bright    Results for orders placed or performed in visit on 07/29/22 "   Comprehensive metabolic panel     Status: Abnormal   Result Value Ref Range    Sodium 138 133 - 144 mmol/L    Potassium 3.6 3.4 - 5.3 mmol/L    Chloride 102 94 - 109 mmol/L    Carbon Dioxide (CO2) 29 20 - 32 mmol/L    Anion Gap 7 3 - 14 mmol/L    Urea Nitrogen 13 7 - 30 mg/dL    Creatinine 0.74 0.52 - 1.04 mg/dL    Calcium 10.1 8.5 - 10.1 mg/dL    Glucose 104 (H) 70 - 99 mg/dL    Alkaline Phosphatase 112 40 - 150 U/L    AST 34 0 - 45 U/L    ALT 29 0 - 50 U/L    Protein Total 8.2 6.8 - 8.8 g/dL    Albumin 4.0 3.4 - 5.0 g/dL    Bilirubin Total 0.6 0.2 - 1.3 mg/dL    GFR Estimate 82 >60 mL/min/1.73m2   Lipid Profile     Status: Abnormal   Result Value Ref Range    Cholesterol 284 (H) <200 mg/dL    Triglycerides 235 (H) <150 mg/dL    Direct Measure HDL 74 >=50 mg/dL    LDL Cholesterol Calculated 163 (H) <=100 mg/dL    Non HDL Cholesterol 210 (H) <130 mg/dL    Patient Fasting > 8hrs? No     Narrative    Cholesterol  Desirable:  <200 mg/dL    Triglycerides  Normal:  Less than 150 mg/dL  Borderline High:  150-199 mg/dL  High:  200-499 mg/dL  Very High:  Greater than or equal to 500 mg/dL    Direct Measure HDL  Female:  Greater than or equal to 50 mg/dL   Male:  Greater than or equal to 40 mg/dL    LDL Cholesterol  Desirable:  <100mg/dL  Above Desirable:  100-129 mg/dL   Borderline High:  130-159 mg/dL   High:  160-189 mg/dL   Very High:  >= 190 mg/dL    Non HDL Cholesterol  Desirable:  130 mg/dL  Above Desirable:  130-159 mg/dL  Borderline High:  160-189 mg/dL  High:  190-219 mg/dL  Very High:  Greater than or equal to 220 mg/dL   CBC with platelets and differential     Status: None   Result Value Ref Range    WBC Count 10.6 4.0 - 11.0 10e3/uL    RBC Count 4.67 3.80 - 5.20 10e6/uL    Hemoglobin 14.1 11.7 - 15.7 g/dL    Hematocrit 42.1 35.0 - 47.0 %    MCV 90 78 - 100 fL    MCH 30.2 26.5 - 33.0 pg    MCHC 33.5 31.5 - 36.5 g/dL    RDW 13.9 10.0 - 15.0 %    Platelet Count 288 150 - 450 10e3/uL    % Neutrophils 72 %    %  Lymphocytes 20 %    % Monocytes 7 %    % Eosinophils 1 %    % Basophils 0 %    % Immature Granulocytes 0 %    NRBCs per 100 WBC 0 <1 /100    Absolute Neutrophils 7.7 1.6 - 8.3 10e3/uL    Absolute Lymphocytes 2.1 0.8 - 5.3 10e3/uL    Absolute Monocytes 0.7 0.0 - 1.3 10e3/uL    Absolute Eosinophils 0.1 0.0 - 0.7 10e3/uL    Absolute Basophils 0.0 0.0 - 0.2 10e3/uL    Absolute Immature Granulocytes 0.0 <=0.4 10e3/uL    Absolute NRBCs 0.0 10e3/uL   CBC with Platelets & Differential     Status: None    Narrative    The following orders were created for panel order CBC with Platelets & Differential.  Procedure                               Abnormality         Status                     ---------                               -----------         ------                     CBC with platelets and d...[[id number]]                      Final result                 Please view results for these tests on the individual orders.         ASSESSMENT / PLAN:   (C91.10) Chronic lymphocytic leukemia (H)  (primary encounter diagnosis)  Comment: stable -- in remission  Plan: Comprehensive metabolic panel, CBC with         Platelets & Differential, TSH, Lipid Profile            (E78.2) Mixed hyperlipidemia  Comment: discussed. No meds recommended. Watch diet   Plan: Comprehensive metabolic panel, CBC with         Platelets & Differential, TSH, Lipid Profile            (M81.0) Age-related osteoporosis without current pathological fracture  Comment: will check DEXA- ??STOP fosamax since close to the 5 yr anupama  Plan: Comprehensive metabolic panel, CBC with         Platelets & Differential, TSH, Lipid Profile,         DX Hip/Pelvis/Spine            (D64.81,  T45.1X5A) Anemia associated with chemotherapy  Comment: resolved since off chemo  Plan: Comprehensive metabolic panel, CBC with         Platelets & Differential, TSH, Lipid Profile            Dry cough -still there. Lungs clear. No real cause. Sounds like prolong viral. Will watch.  No GERD  "sx      COUNSELING:  Declines mammogram - I am ok with this     Estimated body mass index is 19.51 kg/m  as calculated from the following:    Height as of 2/4/22: 1.499 m (4' 11\").    Weight as of this encounter: 43.8 kg (96 lb 9.6 oz).        She reports that she has never smoked. She has never used smokeless tobacco.      Appropriate preventive services were discussed with this patient, including applicable screening as appropriate for cardiovascular disease, diabetes, osteopenia/osteoporosis, and glaucoma.  As appropriate for age/gender, discussed screening for colorectal cancer, prostate cancer, breast cancer, and cervical cancer. Checklist reviewing preventive services available has been given to the patient.    Reviewed patients plan of care and provided an AVS. The Basic Care Plan (routine screening as documented in Health Maintenance) for Jennifer meets the Care Plan requirement. This Care Plan has been established and reviewed with the Patient.    Counseling Resources:  ATP IV Guidelines  Pooled Cohorts Equation Calculator  Breast Cancer Risk Calculator  Breast Cancer: Medication to Reduce Risk  FRAX Risk Assessment  ICSI Preventive Guidelines  Dietary Guidelines for Americans, 2010  USDA's MyPlate  ASA Prophylaxis  Lung CA Screening    Zheng De La Rosa MD  [facility] - [place]    Identified Health Risks:  "

## 2022-08-02 ENCOUNTER — TELEPHONE (OUTPATIENT)
Dept: ONCOLOGY | Facility: OTHER | Age: 82
End: 2022-08-02

## 2022-08-02 NOTE — TELEPHONE ENCOUNTER
I reviewed her lab work and everything looks good.  She does not need to repeat labs before she sees me at the end of the month.

## 2022-08-02 NOTE — TELEPHONE ENCOUNTER
TC from patient, requesting that Judi [Marina] look at her lab results from her recent physical with her primary.  She would like to know if she still needs to keep her appointment and labs with Judi on the 31st since some labs were just done.  Routing to Oncology pool for provider assessment.

## 2022-08-09 ENCOUNTER — HOSPITAL ENCOUNTER (OUTPATIENT)
Dept: BONE DENSITY | Facility: HOSPITAL | Age: 82
Discharge: HOME | End: 2022-08-09
Attending: FAMILY MEDICINE | Admitting: FAMILY MEDICINE
Payer: COMMERCIAL

## 2022-08-09 DIAGNOSIS — M81.0 AGE-RELATED OSTEOPOROSIS WITHOUT CURRENT PATHOLOGICAL FRACTURE: ICD-10-CM

## 2022-08-09 PROCEDURE — 77080 DXA BONE DENSITY AXIAL: CPT

## 2022-08-31 ENCOUNTER — INFUSION THERAPY VISIT (OUTPATIENT)
Dept: INFUSION THERAPY | Facility: OTHER | Age: 82
End: 2022-08-31
Attending: NURSE PRACTITIONER
Payer: COMMERCIAL

## 2022-08-31 DIAGNOSIS — C91.10 CHRONIC LYMPHOCYTIC LEUKEMIA (H): Primary | ICD-10-CM

## 2022-08-31 PROCEDURE — 96523 IRRIG DRUG DELIVERY DEVICE: CPT

## 2022-08-31 PROCEDURE — 250N000011 HC RX IP 250 OP 636: Performed by: NURSE PRACTITIONER

## 2022-08-31 RX ORDER — HEPARIN SODIUM (PORCINE) LOCK FLUSH IV SOLN 100 UNIT/ML 100 UNIT/ML
500 SOLUTION INTRAVENOUS EVERY 8 HOURS
Status: DISCONTINUED | OUTPATIENT
Start: 2022-08-31 | End: 2022-08-31 | Stop reason: HOSPADM

## 2022-08-31 RX ORDER — HEPARIN SODIUM (PORCINE) LOCK FLUSH IV SOLN 100 UNIT/ML 100 UNIT/ML
500 SOLUTION INTRAVENOUS EVERY 8 HOURS
Status: CANCELLED
Start: 2022-08-31

## 2022-08-31 RX ADMIN — Medication 500 UNITS: at 08:25

## 2022-08-31 NOTE — PROGRESS NOTES
Patients port accessed using non-coring, 19 gauge, 3/4 inch needle, per facility protocol.     Hand hygiene performed: yes   Mask donned by caregiver: yes  Site prepped with CHG: yes   Labs drawn: no   Dressing applied using aseptic technique: yes     Port flushed easily, without resistance.  Port flushed per orders/MAR. Needle removed intact, sterile dressing applied.  Slight pressure applied for 30 seconds.   Patient tolerated port flush well, denies pain nor discomfort at this time. Patient instructed to leave dressing intact for a minimum of one hour, and to call with questions or concerns.  Patient states understanding and is in agreement with this plan. Patient discharged.

## 2022-09-08 ENCOUNTER — ONCOLOGY VISIT (OUTPATIENT)
Dept: ONCOLOGY | Facility: OTHER | Age: 82
End: 2022-09-08
Attending: NURSE PRACTITIONER
Payer: COMMERCIAL

## 2022-09-08 VITALS
RESPIRATION RATE: 20 BRPM | OXYGEN SATURATION: 98 % | WEIGHT: 99.43 LBS | BODY MASS INDEX: 20.04 KG/M2 | DIASTOLIC BLOOD PRESSURE: 80 MMHG | HEART RATE: 81 BPM | SYSTOLIC BLOOD PRESSURE: 136 MMHG | HEIGHT: 59 IN | TEMPERATURE: 96 F

## 2022-09-08 DIAGNOSIS — C91.10 CHRONIC LYMPHOCYTIC LEUKEMIA (H): Primary | ICD-10-CM

## 2022-09-08 PROCEDURE — 99213 OFFICE O/P EST LOW 20 MIN: CPT | Performed by: NURSE PRACTITIONER

## 2022-09-08 PROCEDURE — G0463 HOSPITAL OUTPT CLINIC VISIT: HCPCS

## 2022-09-08 ASSESSMENT — PAIN SCALES - GENERAL: PAINLEVEL: NO PAIN (0)

## 2022-09-08 NOTE — PATIENT INSTRUCTIONS
We will schedule you for a port flush in 3 weeks and we will obtain some labs that day.      We will schedule you for port flushes every 6 weeks thereafter.    We would like to see you back in 6 months. Please come 30 minutes prior for lab work through your port.    If you have any questions please call [phone]    Other instructions:  none

## 2022-09-08 NOTE — PROGRESS NOTES
Oncology Follow-up Visit:  September 8, 2022    Reason for Visit:  Patient presents with:  Oncology Clinic Visit: Follow up Chronic lymphocytic leukemia          Nursing Note and documentation reviewed: yes    HPI: This is a 81-year-old female patient who presents to the oncology clinic today in follow up of CLL diagnosed December 2011. She completed 6 cycles of Bendamustine July 2, 2015.   She is being followed with surveillance.    She presents to the clinic today stating she is doing well.  She did have a episode of pharyngitis and was placed on antibiotics in July.  No further infections.  She denies any fevers, night sweats or weight loss.  Her appetite is good.  She denies any new lumps.  She continues to follow with Dr. De La Rosa such as her PCP.    Oncologic History:     Jennifer was diagnosed with CLL in December 2011 after her primary care provider noted an elevated WBC on her annual exam. A peripheral blood flow for cytometry revealed she was CD5 positive, CD10 negative, consistent with CLL/SLL. Staging studies were completed and CT scan showed no evidence of lymphadenopathy or splenomegaly. She was asymptomatic with essentially stable lymphocyte count and followed with surveillance.      With patient's follow up in October 2014, hemoglobin was noted to be 10.7 with Hematocrit 30.0 and platelets 137,000. She was subsequently seen again 2 months later with further drop in hemoglobin to 8.6 with hematocrit 26.1 and platelets 140,000 with a white blood cell count of 13.5 and ANC 1.2, absolute lymphocytes were 12.1. A PET scan was completed on 12/11/2014 which did show mildly enlarged axillary lymph nodes with very mild abnormal hypermetabolism of SUV of 2 or less. Bone marrow aspiration and biopsy was completed which revealed infiltration of CLL with 90% of cells confirmed to be CLL via flow morphologically. Patient was staged at least stage III CLL based on her anemia. The plan was to proceed with chemotherapy  consisting of Rituxan and Fludara. Patient did experience hypersensitivity reaction with cycle 2 Rituxan therapy experiencing hypotension. This was discontinued and chemotherapy regime was changed to Bendamustine day 1 and day 2 every 28 days. She was started on 2/12/2015 and completed therapy in July 2015.      Current Chemo Regime/TX:  n/a  Current Cycle:  n/a  # of completed cycles:  n/a      Previous treatment: Rituxan/Fludara completing one full cycle and experienced hypersensitivity with Rituxan with second cycle;  Bendamustine 50 mg per metered squared days 1 and 2 every 28 days x6 cycles completed 7/2/15     Past Medical History:   Diagnosis Date     Arthritis     back, fingers, legs     Cancer ([facility])     chronic lymphocytic leukemia     Chronic lymphoid leukemia, 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 2/28/2012     Diverticulosis of colon (without mention of hemorrhage) 11/29/2010     Family history of ischemic heart disease 11/29/2010     Family history of malignant neoplasm of gastrointestinal tract 10/14/2002     History of blood transfusion     prior to chemo     Non-toxic multinodular goiter 11/8/2016     Osteoporosis, unspecified 11/29/2010     Other and unspecified hyperlipidemia 11/28/2011     Viral warts, unspecified 11/28/2011       Past Surgical History:   Procedure Laterality Date     ------------OTHER-------------      elbow repair     COLONOSCOPY  01/12/2010    repeat in 2015     COLONOSCOPY      SEVERAL COLONOSCOPIES - ALL BEEN NORMAL - REFUSES ANY MORE      COLONOSCOPY       INSERT PORT VASCULAR ACCESS N/A 1/5/2015    Procedure: INSERT PORT VASCULAR ACCESS;  Surgeon: Linda Oliver MD;  Location: [facility]     IR PORT CHECK LEFT  5/20/2020     ORTHOPEDIC SURGERY  1993    Left elbow repair       Family History   Problem Relation Age of Onset     Cancer Mother 97        colon cancer - cause of death     Colon Cancer Mother      Cancer Father      Colon Cancer Father      Diabetes  Brother      Obesity Brother      Breast Cancer Daughter      Hyperlipidemia Daughter      Thyroid Disease Daughter      Hypertension Son      Hyperlipidemia Son      Thyroid Disease Cousin      Hyperlipidemia Son      Coronary Artery Disease No family hx of      Cerebrovascular Disease No family hx of      Prostate Cancer No family hx of      Other Cancer No family hx of      Anesthesia Reaction No family hx of      Asthma No family hx of      Genetic Disorder No family hx of        Social History     Socioeconomic History     Marital status:      Spouse name: Not on file     Number of children: Not on file     Years of education: Not on file     Highest education level: Not on file   Occupational History     Occupation: [employer]   Tobacco Use     Smoking status: Never Smoker     Smokeless tobacco: Never Used   Vaping Use     Vaping Use: Never used   Substance and Sexual Activity     Alcohol use: No     Drug use: No     Sexual activity: Never     Comment: devorced   Other Topics Concern     Parent/sibling w/ CABG, MI or angioplasty before 65F 55M? No      Service No     Blood Transfusions Yes     Comment: permits if needed     Caffeine Concern Yes     Comment: 1 cup     Occupational Exposure No     Hobby Hazards No     Sleep Concern No     Stress Concern No     Weight Concern No     Special Diet No     Back Care No     Exercise No     Bike Helmet Not Asked     Seat Belt Yes     Self-Exams Not Asked   Social History Narrative     Not on file     Social Determinants of Health     Financial Resource Strain: Not on file   Food Insecurity: Not on file   Transportation Needs: Not on file   Physical Activity: Not on file   Stress: Not on file   Social Connections: Not on file   Intimate Partner Violence: Not on file   Housing Stability: Not on file       Current Outpatient Medications   Medication     Ascorbic Acid (VITAMIN C PO)     Cholecalciferol (VITAMIN D3 PO)     Multiple Vitamins-Minerals (CENTRUM  "SILVER 50+WOMEN PO)     lidocaine-prilocaine (EMLA) cream     No current facility-administered medications for this visit.        Allergies   Allergen Reactions     Sulfa Drugs Rash     Rituxan [Rituximab]      Simvastatin Other (See Comments)     Takes pravastatin at home  Zocor - myalgia       Review Of Systems:  Constitutional:    denies fever, weight changes, chills, and night sweats.  Eyes:    denies blurred or double vision  Ears/Nose/Throat:   denies ear pain, difficulty swallowing; has chronic runny nose  Respiratory:   denies shortness of breath, occasional cough   Skin:   denies rash, lesions  Cardiovascular:   denies chest pain, palpitations, edema  Gastrointestinal:   denies abdominal pain, bloating, nausea, early satiety; no change in bowel habits or blood in stool  Genitourinary:   denies difficulty with urination, blood in urine  Musculoskeletal:    denies new muscle pain, bone pain  Neurologic:   denies lightheadedness, headaches, numbness or tingling  Psychiatric:   denies anxiety, depression  Hematologic/Lymphatic/Immunologic:   denies easy bruising, easy bleeding, lumps or bumps noted  Endocrine:   Denies increased thirst      Physical Exam:  /80   Pulse 81   Temp (!) 96  F (35.6  C) (Tympanic)   Resp 20   Ht 1.499 m (4' 11\")   Wt 45.1 kg (99 lb 6.8 oz)   SpO2 98%   BMI 20.08 kg/m      GENERAL APPEARANCE: Healthy, alert and in no acute distress.  HEENT: Normocephalic, Sclerae anicteric.  No oral lesions or thrush  NECK:   No asymmetry or masses, no thyromegaly.  LYMPHATICS: No palpable cervical, supraclavicular, axillary, or inguinal nodes   RESP: Lungs clear to auscultation bilaterally, respirations regular and easy  CARDIOVASCULAR: Regular rate and rhythm. Normal S1, S2; no murmur, gallop, or rub.  ABDOMEN: Soft, nontender. Bowel sounds auscultated all 4 quadrants. No palpable organomegaly or masses.  MUSCULOSKELETAL: Extremities without gross deformities noted. No edema of " bilateral lower extremities.  SKIN: No suspicious lesions or rashes to exposed skin  NEURO: Alert and oriented x 3.  Gait steady.  PSYCHIATRIC: Mentation and affect appear normal.  Mood appropriate.    Laboratory:  Component      Latest Ref Rng & Units 7/29/2022   WBC      4.0 - 11.0 10e3/uL 10.6   RBC Count      3.80 - 5.20 10e6/uL 4.67   Hemoglobin      11.7 - 15.7 g/dL 14.1   Hematocrit      35.0 - 47.0 % 42.1   MCV      78 - 100 fL 90   MCH      26.5 - 33.0 pg 30.2   MCHC      31.5 - 36.5 g/dL 33.5   RDW      10.0 - 15.0 % 13.9   Platelet Count      150 - 450 10e3/uL 288   % Neutrophils      % 72   % Lymphocytes      % 20   % Monocytes      % 7   % Eosinophils      % 1   % Basophils      % 0   % Immature Granulocytes      % 0   NRBCs per 100 WBC      <1 /100 0   Absolute Neutrophils      1.6 - 8.3 10e3/uL 7.7   Absolute Lymphocytes      0.8 - 5.3 10e3/uL 2.1   Absolute Monocytes      0.0 - 1.3 10e3/uL 0.7   Absolute Eosinophils      0.0 - 0.7 10e3/uL 0.1   Absolute Basophils      0.0 - 0.2 10e3/uL 0.0   Absolute Immature Granulocytes      <=0.4 10e3/uL 0.0   Absolute NRBCs      10e3/uL 0.0   Sodium      133 - 144 mmol/L 138   Potassium      3.4 - 5.3 mmol/L 3.6   Chloride      94 - 109 mmol/L 102   Carbon Dioxide      20 - 32 mmol/L 29   Anion Gap      3 - 14 mmol/L 7   Urea Nitrogen      7 - 30 mg/dL 13   Creatinine      0.52 - 1.04 mg/dL 0.74   Calcium      8.5 - 10.1 mg/dL 10.1   Glucose      70 - 99 mg/dL 104 (H)   Alkaline Phosphatase      40 - 150 U/L 112   AST      0 - 45 U/L 34   ALT      0 - 50 U/L 29   Protein Total      6.8 - 8.8 g/dL 8.2   Albumin      3.4 - 5.0 g/dL 4.0   Bilirubin Total      0.2 - 1.3 mg/dL 0.6   GFR Estimate      >60 mL/min/1.73m2 82     Imaging Studies:  None completed for today's visit      ASSESSMENT/PLAN:    #1 CLL/SLL: Diagnosed with CLL/SLL in December 2011. Stage III. She received 6 cycles of bendamustine completing this July 2015 and is now followed with surveillance.  She  is feeling good.  No concerns on lab work.  No LDH obtained.  We will obtain a CBC and LDH in 3 weeks when she comes for her port flush and call her with that result.  She will follow up in 6 months with a CBC, CMP and LDH.      I encouraged patient to call with any questions or concerns.    Yaneli Gray, NP  APRN, FNP-BC, AOCNP

## 2022-09-08 NOTE — NURSING NOTE
"Oncology Rooming Note    September 8, 2022 8:12 AM   Jennifer Barajas is a 81 year old female who presents for:    Chief Complaint   Patient presents with     Oncology Clinic Visit     Follow up Chronic lymphocytic leukemia          Initial Vitals: /80   Pulse 81   Temp (!) 96  F (35.6  C) (Tympanic)   Resp 20   Ht 1.499 m (4' 11\")   Wt 45.1 kg (99 lb 6.8 oz)   SpO2 98%   BMI 20.08 kg/m   Estimated body mass index is 20.08 kg/m  as calculated from the following:    Height as of this encounter: 1.499 m (4' 11\").    Weight as of this encounter: 45.1 kg (99 lb 6.8 oz). Body surface area is 1.37 meters squared.  No Pain (0) Comment: Data Unavailable   No LMP recorded. Patient is postmenopausal.  Allergies reviewed: Yes  Medications reviewed: Yes    Medications: Medication refills not needed today.  Pharmacy name entered into EPIC:    [id number] - [place], [place] - [address] [id number] - [place] - [address] AT [facility] OF  [address]          Ana Tate LPN            "

## 2022-09-29 ENCOUNTER — LAB (OUTPATIENT)
Dept: INFUSION THERAPY | Facility: OTHER | Age: 82
End: 2022-09-29
Attending: INTERNAL MEDICINE
Payer: COMMERCIAL

## 2022-09-29 DIAGNOSIS — C91.10 CHRONIC LYMPHOCYTIC LEUKEMIA (H): Primary | ICD-10-CM

## 2022-09-29 LAB
BASOPHILS # BLD AUTO: 0 10E3/UL (ref 0–0.2)
BASOPHILS NFR BLD AUTO: 0 %
EOSINOPHIL # BLD AUTO: 0.1 10E3/UL (ref 0–0.7)
EOSINOPHIL NFR BLD AUTO: 1 %
ERYTHROCYTE [DISTWIDTH] IN BLOOD BY AUTOMATED COUNT: 13.5 % (ref 10–15)
HCT VFR BLD AUTO: 39.5 % (ref 35–47)
HGB BLD-MCNC: 13.4 G/DL (ref 11.7–15.7)
IMM GRANULOCYTES # BLD: 0 10E3/UL
IMM GRANULOCYTES NFR BLD: 0 %
LDH SERPL L TO P-CCNC: 220 U/L (ref 81–234)
LYMPHOCYTES # BLD AUTO: 1.4 10E3/UL (ref 0.8–5.3)
LYMPHOCYTES NFR BLD AUTO: 18 %
MCH RBC QN AUTO: 30 PG (ref 26.5–33)
MCHC RBC AUTO-ENTMCNC: 33.9 G/DL (ref 31.5–36.5)
MCV RBC AUTO: 89 FL (ref 78–100)
MONOCYTES # BLD AUTO: 0.5 10E3/UL (ref 0–1.3)
MONOCYTES NFR BLD AUTO: 6 %
NEUTROPHILS # BLD AUTO: 6.1 10E3/UL (ref 1.6–8.3)
NEUTROPHILS NFR BLD AUTO: 75 %
NRBC # BLD AUTO: 0 10E3/UL
NRBC BLD AUTO-RTO: 0 /100
PLATELET # BLD AUTO: 259 10E3/UL (ref 150–450)
RBC # BLD AUTO: 4.46 10E6/UL (ref 3.8–5.2)
WBC # BLD AUTO: 8.1 10E3/UL (ref 4–11)

## 2022-09-29 PROCEDURE — 250N000011 HC RX IP 250 OP 636: Performed by: NURSE PRACTITIONER

## 2022-09-29 PROCEDURE — 83615 LACTATE (LD) (LDH) ENZYME: CPT | Mod: ZL

## 2022-09-29 PROCEDURE — 85014 HEMATOCRIT: CPT | Mod: ZL

## 2022-09-29 PROCEDURE — 36591 DRAW BLOOD OFF VENOUS DEVICE: CPT

## 2022-09-29 PROCEDURE — 36415 COLL VENOUS BLD VENIPUNCTURE: CPT | Mod: ZL

## 2022-09-29 PROCEDURE — 96523 IRRIG DRUG DELIVERY DEVICE: CPT

## 2022-09-29 RX ORDER — HEPARIN SODIUM (PORCINE) LOCK FLUSH IV SOLN 100 UNIT/ML 100 UNIT/ML
500 SOLUTION INTRAVENOUS EVERY 8 HOURS
Status: CANCELLED
Start: 2022-09-29

## 2022-09-29 RX ORDER — HEPARIN SODIUM (PORCINE) LOCK FLUSH IV SOLN 100 UNIT/ML 100 UNIT/ML
500 SOLUTION INTRAVENOUS EVERY 8 HOURS
Status: DISCONTINUED | OUTPATIENT
Start: 2022-09-29 | End: 2022-09-29 | Stop reason: HOSPADM

## 2022-09-29 RX ADMIN — HEPARIN 500 UNITS: 100 SYRINGE at 08:47

## 2022-09-29 NOTE — PROGRESS NOTES
Patients port accessed using non-coring, 19 gauge, 3/4 inch needle, per facility protocol.     Hand hygiene performed: yes   Mask donned by caregiver: yes  Site prepped with CHG: yes   Labs drawn: yes   Dressing applied using aseptic technique: yes     Port flushed easily, without resistance. Flushed with 10 cc's normal saline.   Immediate blood return noted. 10 cc blood discarded.  Ordered labs obtained and sent to lab.  Port flushed per orders/MAR. Needle removed intact, sterile dressing applied.  Slight pressure applied for 30 seconds.   Patient tolerated port flush well, denies pain nor discomfort at this time. Patient instructed to leave dressing intact for a minimum of one hour, and to call with questions or concerns.  Patient states understanding and is in agreement with this plan. Patient discharged.

## 2022-11-10 ENCOUNTER — INFUSION THERAPY VISIT (OUTPATIENT)
Dept: INFUSION THERAPY | Facility: OTHER | Age: 82
End: 2022-11-10
Attending: INTERNAL MEDICINE
Payer: COMMERCIAL

## 2022-11-10 DIAGNOSIS — Z23 NEED FOR PROPHYLACTIC VACCINATION AND INOCULATION AGAINST INFLUENZA: ICD-10-CM

## 2022-11-10 DIAGNOSIS — C91.10 CHRONIC LYMPHOCYTIC LEUKEMIA (H): Primary | ICD-10-CM

## 2022-11-10 PROCEDURE — G0008 ADMIN INFLUENZA VIRUS VAC: HCPCS

## 2022-11-10 PROCEDURE — 250N000011 HC RX IP 250 OP 636: Performed by: NURSE PRACTITIONER

## 2022-11-10 PROCEDURE — 96523 IRRIG DRUG DELIVERY DEVICE: CPT

## 2022-11-10 RX ORDER — HEPARIN SODIUM (PORCINE) LOCK FLUSH IV SOLN 100 UNIT/ML 100 UNIT/ML
500 SOLUTION INTRAVENOUS EVERY 8 HOURS
Status: CANCELLED
Start: 2022-11-10

## 2022-11-10 RX ORDER — HEPARIN SODIUM (PORCINE) LOCK FLUSH IV SOLN 100 UNIT/ML 100 UNIT/ML
500 SOLUTION INTRAVENOUS EVERY 8 HOURS
Status: DISCONTINUED | OUTPATIENT
Start: 2022-11-10 | End: 2022-11-10 | Stop reason: HOSPADM

## 2022-11-10 RX ADMIN — HEPARIN 500 UNITS: 100 SYRINGE at 08:29

## 2022-11-10 NOTE — PROGRESS NOTES
Patient is an 82 year old here today for port flush. Skin is warm, clean, dry, and intact without redness, swelling, nor other signs of infection noted.  Port accessed via sterile technique per facility protocol with a 20 gauge, 3/4 inch non coring 90 degree bent hutchison needle. Port flushed easily, without resistance. Immediate blood return noted.  Flushed with 10 cc 0.9% normal saline and 5 cc heparinized saline.  Needle removed intact, sterile dressing applied.  Slight pressure applied for 30 seconds.   Patient tolerated port flush well, denies pain nor discomfort at this time. Patient instructed to leave dressing intact for a minimum of one hour, and to call with questions or concerns.  Patient states understanding and is in agreement with this plan.  Patient discharged ambulatory.

## 2022-11-10 NOTE — PROGRESS NOTES
Patient is an 82 year old, here today requesting influenza vaccine.    Patient denies questions nor concerns regarding medication, administration site, side effects, nor aftercare.  Patient identified with two identifiers, order verified, and verbal consent for today's injection obtained from patient.     Patient education provided on s/s of injection site infection, and/or medication specific side effects, and when to call a provider.  Patient instructed to report any adverse effects.     Influenza vaccine administered per protocol in left deltoid.  Site covered with sterile bandage.  Patient tolerated injection well, no verbal nor non-verbal signs of discomfort noted.  No adverse effects noted at this time.     Patient instructed to call with further questions or concerns.  Patient states understanding and is in agreement with this plan.  Patient discharged.

## 2022-11-10 NOTE — PATIENT INSTRUCTIONS

## 2022-12-22 ENCOUNTER — INFUSION THERAPY VISIT (OUTPATIENT)
Dept: INFUSION THERAPY | Facility: OTHER | Age: 82
End: 2022-12-22
Attending: INTERNAL MEDICINE
Payer: COMMERCIAL

## 2022-12-22 DIAGNOSIS — C91.10 CHRONIC LYMPHOCYTIC LEUKEMIA (H): Primary | ICD-10-CM

## 2022-12-22 PROCEDURE — 96523 IRRIG DRUG DELIVERY DEVICE: CPT

## 2022-12-22 PROCEDURE — 250N000011 HC RX IP 250 OP 636: Performed by: NURSE PRACTITIONER

## 2022-12-22 RX ORDER — HEPARIN SODIUM (PORCINE) LOCK FLUSH IV SOLN 100 UNIT/ML 100 UNIT/ML
500 SOLUTION INTRAVENOUS EVERY 8 HOURS
Status: DISCONTINUED | OUTPATIENT
Start: 2022-12-22 | End: 2022-12-22 | Stop reason: HOSPADM

## 2022-12-22 RX ORDER — HEPARIN SODIUM (PORCINE) LOCK FLUSH IV SOLN 100 UNIT/ML 100 UNIT/ML
500 SOLUTION INTRAVENOUS EVERY 8 HOURS
Status: CANCELLED
Start: 2022-12-22

## 2022-12-22 RX ADMIN — Medication 500 UNITS: at 08:35

## 2023-02-01 RX ORDER — HEPARIN SODIUM (PORCINE) LOCK FLUSH IV SOLN 100 UNIT/ML 100 UNIT/ML
500 SOLUTION INTRAVENOUS EVERY 8 HOURS
Status: CANCELLED
Start: 2023-02-01

## 2023-02-02 ENCOUNTER — INFUSION THERAPY VISIT (OUTPATIENT)
Dept: INFUSION THERAPY | Facility: OTHER | Age: 83
End: 2023-02-02
Attending: INTERNAL MEDICINE
Payer: COMMERCIAL

## 2023-02-02 DIAGNOSIS — C91.10 CHRONIC LYMPHOCYTIC LEUKEMIA (H): Primary | ICD-10-CM

## 2023-02-02 PROCEDURE — 250N000011 HC RX IP 250 OP 636: Performed by: NURSE PRACTITIONER

## 2023-02-02 PROCEDURE — 96523 IRRIG DRUG DELIVERY DEVICE: CPT

## 2023-02-02 RX ORDER — HEPARIN SODIUM (PORCINE) LOCK FLUSH IV SOLN 100 UNIT/ML 100 UNIT/ML
500 SOLUTION INTRAVENOUS EVERY 8 HOURS
Status: DISCONTINUED | OUTPATIENT
Start: 2023-02-02 | End: 2023-02-02 | Stop reason: HOSPADM

## 2023-02-02 RX ADMIN — Medication 500 UNITS: at 08:33

## 2023-03-16 ENCOUNTER — LAB (OUTPATIENT)
Dept: ONCOLOGY | Facility: OTHER | Age: 83
End: 2023-03-16
Attending: NURSE PRACTITIONER
Payer: COMMERCIAL

## 2023-03-16 VITALS
TEMPERATURE: 97.1 F | DIASTOLIC BLOOD PRESSURE: 70 MMHG | BODY MASS INDEX: 19.6 KG/M2 | RESPIRATION RATE: 20 BRPM | HEART RATE: 98 BPM | SYSTOLIC BLOOD PRESSURE: 154 MMHG | OXYGEN SATURATION: 98 % | HEIGHT: 59 IN | WEIGHT: 97.22 LBS

## 2023-03-16 DIAGNOSIS — C91.10 CHRONIC LYMPHOCYTIC LEUKEMIA (H): Primary | ICD-10-CM

## 2023-03-16 DIAGNOSIS — Z95.828 PORT-A-CATH IN PLACE: ICD-10-CM

## 2023-03-16 LAB
ALBUMIN SERPL BCG-MCNC: 4.3 G/DL (ref 3.5–5.2)
ALP SERPL-CCNC: 89 U/L (ref 35–104)
ALT SERPL W P-5'-P-CCNC: 23 U/L (ref 10–35)
ANION GAP SERPL CALCULATED.3IONS-SCNC: 12 MMOL/L (ref 7–15)
AST SERPL W P-5'-P-CCNC: 34 U/L (ref 10–35)
BASOPHILS # BLD AUTO: 0 10E3/UL (ref 0–0.2)
BASOPHILS NFR BLD AUTO: 0 %
BILIRUB SERPL-MCNC: 0.4 MG/DL
BUN SERPL-MCNC: 15.3 MG/DL (ref 8–23)
CALCIUM SERPL-MCNC: 10 MG/DL (ref 8.8–10.2)
CHLORIDE SERPL-SCNC: 99 MMOL/L (ref 98–107)
CREAT SERPL-MCNC: 0.76 MG/DL (ref 0.51–0.95)
DEPRECATED HCO3 PLAS-SCNC: 27 MMOL/L (ref 22–29)
EOSINOPHIL # BLD AUTO: 0.1 10E3/UL (ref 0–0.7)
EOSINOPHIL NFR BLD AUTO: 1 %
ERYTHROCYTE [DISTWIDTH] IN BLOOD BY AUTOMATED COUNT: 13.5 % (ref 10–15)
GFR SERPL CREATININE-BSD FRML MDRD: 79 ML/MIN/1.73M2
GLUCOSE SERPL-MCNC: 112 MG/DL (ref 70–99)
HCT VFR BLD AUTO: 41.1 % (ref 35–47)
HGB BLD-MCNC: 14.1 G/DL (ref 11.7–15.7)
IMM GRANULOCYTES # BLD: 0 10E3/UL
IMM GRANULOCYTES NFR BLD: 0 %
LDH SERPL L TO P-CCNC: 211 U/L (ref 0–250)
LYMPHOCYTES # BLD AUTO: 1.4 10E3/UL (ref 0.8–5.3)
LYMPHOCYTES NFR BLD AUTO: 15 %
MCH RBC QN AUTO: 30.7 PG (ref 26.5–33)
MCHC RBC AUTO-ENTMCNC: 34.3 G/DL (ref 31.5–36.5)
MCV RBC AUTO: 89 FL (ref 78–100)
MONOCYTES # BLD AUTO: 0.6 10E3/UL (ref 0–1.3)
MONOCYTES NFR BLD AUTO: 6 %
NEUTROPHILS # BLD AUTO: 7 10E3/UL (ref 1.6–8.3)
NEUTROPHILS NFR BLD AUTO: 78 %
NRBC # BLD AUTO: 0 10E3/UL
NRBC BLD AUTO-RTO: 0 /100
PLATELET # BLD AUTO: 238 10E3/UL (ref 150–450)
POTASSIUM SERPL-SCNC: 4.1 MMOL/L (ref 3.4–5.3)
PROT SERPL-MCNC: 7 G/DL (ref 6.4–8.3)
RBC # BLD AUTO: 4.6 10E6/UL (ref 3.8–5.2)
SODIUM SERPL-SCNC: 138 MMOL/L (ref 136–145)
WBC # BLD AUTO: 9 10E3/UL (ref 4–11)

## 2023-03-16 PROCEDURE — 82374 ASSAY BLOOD CARBON DIOXIDE: CPT | Mod: ZL

## 2023-03-16 PROCEDURE — G0463 HOSPITAL OUTPT CLINIC VISIT: HCPCS

## 2023-03-16 PROCEDURE — 96523 IRRIG DRUG DELIVERY DEVICE: CPT

## 2023-03-16 PROCEDURE — 99213 OFFICE O/P EST LOW 20 MIN: CPT | Performed by: NURSE PRACTITIONER

## 2023-03-16 PROCEDURE — 85025 COMPLETE CBC W/AUTO DIFF WBC: CPT | Mod: ZL

## 2023-03-16 PROCEDURE — 36415 COLL VENOUS BLD VENIPUNCTURE: CPT | Mod: ZL

## 2023-03-16 PROCEDURE — 250N000011 HC RX IP 250 OP 636: Performed by: NURSE PRACTITIONER

## 2023-03-16 PROCEDURE — 83615 LACTATE (LD) (LDH) ENZYME: CPT | Mod: ZL

## 2023-03-16 PROCEDURE — 82435 ASSAY OF BLOOD CHLORIDE: CPT | Mod: ZL

## 2023-03-16 RX ORDER — HEPARIN SODIUM (PORCINE) LOCK FLUSH IV SOLN 100 UNIT/ML 100 UNIT/ML
500 SOLUTION INTRAVENOUS EVERY 8 HOURS
Status: CANCELLED
Start: 2023-03-16

## 2023-03-16 RX ORDER — HEPARIN SODIUM (PORCINE) LOCK FLUSH IV SOLN 100 UNIT/ML 100 UNIT/ML
500 SOLUTION INTRAVENOUS EVERY 8 HOURS
Status: DISCONTINUED | OUTPATIENT
Start: 2023-03-16 | End: 2023-03-16 | Stop reason: HOSPADM

## 2023-03-16 RX ADMIN — HEPARIN 500 UNITS: 100 SYRINGE at 08:30

## 2023-03-16 ASSESSMENT — PAIN SCALES - GENERAL: PAINLEVEL: NO PAIN (0)

## 2023-03-16 NOTE — PROGRESS NOTES
Patients left sided port accessed using non-coring, 19 gauge, 3/4 needle. Port accessed per facility protocol.  Hand hygiene performed: yes   Mask donned by caregiver: yes Site prepped with CHG: yes Labs drawn: yes Dressing applied using aseptic technique: yes Port flushed easily, without resistance. Flushed with 10 cc's normal saline.   Immediate blood return noted. 10 cc blood discarded.  2 vials blood draw and sent to lab for results.  Port flushed with 20 cc 0.9% normal saline and 5 cc heparinized saline.  Needle removed intact, sterile dressing applied.  Slight pressure applied for 30 seconds.   Patient tolerated port flush well, denies pain nor discomfort at this time. Patient instructed to leave dressing intact for a minimum of one hour, and to call with questions or concerns.  Patient states understanding and is in agreement with this plan. Patient discharged ambulatory. Shannan Barton RN

## 2023-03-16 NOTE — NURSING NOTE
"Oncology Rooming Note    March 16, 2023 8:26 AM   Jennifer Barajas is a 82 year old female who presents for:    Chief Complaint   Patient presents with     Oncology Clinic Visit     Follow up Chronic lymphocytic leukemia        Initial Vitals: BP (!) 170/64   Pulse 98   Temp 97.1  F (36.2  C) (Tympanic)   Resp 20   Ht 1.499 m (4' 11\")   Wt 44.1 kg (97 lb 3.6 oz)   SpO2 98%   BMI 19.64 kg/m   Estimated body mass index is 19.64 kg/m  as calculated from the following:    Height as of this encounter: 1.499 m (4' 11\").    Weight as of this encounter: 44.1 kg (97 lb 3.6 oz). Body surface area is 1.35 meters squared.  No Pain (0) Comment: Data Unavailable   No LMP recorded. Patient is postmenopausal.  Allergies reviewed: Yes  Medications reviewed: Yes    Medications: Medication refills not needed today.  Pharmacy name entered into EPIC:    [id number] - [place], [place] - [address] [id number] - [place], [place] - [address] AT [facility] [address]          Ana Tate LPN            "

## 2023-03-16 NOTE — PROGRESS NOTES
Oncology Follow-up Visit:  March 16, 2023    Reason for Visit:  Patient presents with:  Oncology Clinic Visit: Follow up Chronic lymphocytic leukemia        Nursing Note and documentation reviewed: yes    HPI:  This is a 82-year-old female patient who presents to the oncology clinic today in follow up of CLL diagnosed December 2011. She completed 6 cycles of Bendamustine July 2, 2015.   She is being followed with surveillance.     She presents to the clinic today stating she is doing well and offers no new complaints.  She does continue to admit to anxiety especially prior to this appointment and she does get very anxious.  She denies any fevers or recent infections and denies any night sweats or weight loss.  She denies any new lumps.    She saw Dr. De La Rosa in July and will see him again in July.    Oncologic History:     Jennifer was diagnosed with CLL in December 2011 after her primary care provider noted an elevated WBC on her annual exam. A peripheral blood flow for cytometry revealed she was CD5 positive, CD10 negative, consistent with CLL/SLL. Staging studies were completed and CT scan showed no evidence of lymphadenopathy or splenomegaly. She was asymptomatic with essentially stable lymphocyte count and followed with surveillance.      With patient's follow up in October 2014, hemoglobin was noted to be 10.7 with Hematocrit 30.0 and platelets 137,000. She was subsequently seen again 2 months later with further drop in hemoglobin to 8.6 with hematocrit 26.1 and platelets 140,000 with a white blood cell count of 13.5 and ANC 1.2, absolute lymphocytes were 12.1. A PET scan was completed on 12/11/2014 which did show mildly enlarged axillary lymph nodes with very mild abnormal hypermetabolism of SUV of 2 or less. Bone marrow aspiration and biopsy was completed which revealed infiltration of CLL with 90% of cells confirmed to be CLL via flow morphologically. Patient was staged at least stage III CLL based on her  anemia. The plan was to proceed with chemotherapy consisting of Rituxan and Fludara. Patient did experience hypersensitivity reaction with cycle 2 Rituxan therapy experiencing hypotension. This was discontinued and chemotherapy regime was changed to Bendamustine day 1 and day 2 every 28 days. She was started on 2/12/2015 and completed therapy in July 2015.      Current Chemo Regime/TX:  n/a  Current Cycle:  n/a  # of completed cycles:  n/a      Previous treatment: Rituxan/Fludara completing one full cycle and experienced hypersensitivity with Rituxan with second cycle;  Bendamustine 50 mg per metered squared days 1 and 2 every 28 days x6 cycles completed 7/2/15      Past Medical History:   Diagnosis Date     Arthritis     back, fingers, legs     Cancer ([facility])     chronic lymphocytic leukemia     Chronic lymphoid leukemia, 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 2/28/2012     Diverticulosis of colon (without mention of hemorrhage) 11/29/2010     Family history of ischemic heart disease 11/29/2010     Family history of malignant neoplasm of gastrointestinal tract 10/14/2002     History of blood transfusion     prior to chemo     Non-toxic multinodular goiter 11/8/2016     Osteoporosis, unspecified 11/29/2010     Other and unspecified hyperlipidemia 11/28/2011     Viral warts, unspecified 11/28/2011       Past Surgical History:   Procedure Laterality Date     ------------OTHER-------------      elbow repair     COLONOSCOPY  01/12/2010    repeat in 2015     COLONOSCOPY      SEVERAL COLONOSCOPIES - ALL BEEN NORMAL - REFUSES ANY MORE      COLONOSCOPY       INSERT PORT VASCULAR ACCESS N/A 1/5/2015    Procedure: INSERT PORT VASCULAR ACCESS;  Surgeon: Linda Oliver MD;  Location: [facility]     IR PORT CHECK LEFT  5/20/2020     ORTHOPEDIC SURGERY  1993    Left elbow repair       Family History   Problem Relation Age of Onset     Cancer Mother 97        colon cancer - cause of death     Colon Cancer Mother       Cancer Father      Colon Cancer Father      Diabetes Brother      Obesity Brother      Breast Cancer Daughter      Hyperlipidemia Daughter      Thyroid Disease Daughter      Hypertension Son      Hyperlipidemia Son      Thyroid Disease Cousin      Hyperlipidemia Son      Coronary Artery Disease No family hx of      Cerebrovascular Disease No family hx of      Prostate Cancer No family hx of      Other Cancer No family hx of      Anesthesia Reaction No family hx of      Asthma No family hx of      Genetic Disorder No family hx of        Social History     Socioeconomic History     Marital status:      Spouse name: Not on file     Number of children: Not on file     Years of education: Not on file     Highest education level: Not on file   Occupational History     Occupation: [employer]   Tobacco Use     Smoking status: Never     Smokeless tobacco: Never   Vaping Use     Vaping Use: Never used   Substance and Sexual Activity     Alcohol use: No     Drug use: No     Sexual activity: Never     Comment: devorced   Other Topics Concern     Parent/sibling w/ CABG, MI or angioplasty before 65F 55M? No      Service No     Blood Transfusions Yes     Comment: permits if needed     Caffeine Concern Yes     Comment: 1 cup     Occupational Exposure No     Hobby Hazards No     Sleep Concern No     Stress Concern No     Weight Concern No     Special Diet No     Back Care No     Exercise No     Bike Helmet Not Asked     Seat Belt Yes     Self-Exams Not Asked   Social History Narrative     Not on file     Social Determinants of Health     Financial Resource Strain: Not on file   Food Insecurity: Not on file   Transportation Needs: Not on file   Physical Activity: Not on file   Stress: Not on file   Social Connections: Not on file   Intimate Partner Violence: Not on file   Housing Stability: Not on file       Current Outpatient Medications   Medication     Ascorbic Acid (VITAMIN C PO)     Cholecalciferol (VITAMIN D3  "PO)     lidocaine-prilocaine (EMLA) cream     Multiple Vitamins-Minerals (CENTRUM SILVER 50+WOMEN PO)     No current facility-administered medications for this visit.     Facility-Administered Medications Ordered in Other Visits   Medication     heparin 100 UNIT/ML injection 500 Units     sodium chloride (PF) 0.9% PF flush 10 mL        Allergies   Allergen Reactions     Sulfa Drugs Rash     Rituxan [Rituximab]      Simvastatin Other (See Comments)     Takes pravastatin at home  Zocor - myalgia       Review Of Systems:  Constitutional:    denies fever, weight changes, chills, and night sweats.  Eyes:    denies blurred or double vision  Ears/Nose/Throat:   denies ear pain, difficulty swallowing; has runny nose  Respiratory:   denies shortness of breath, cough   Skin:   denies rash, lesions  Cardiovascular:   denies chest pain, palpitations, edema  Gastrointestinal:   denies abdominal pain, bloating, nausea, early satiety; no change in bowel habits or blood in stool  Genitourinary:   denies difficulty with urination, blood in urine  Musculoskeletal:    denies new muscle pain, bone pain  Neurologic:   denies lightheadedness, headaches, numbness or tingling  Psychiatric:   denies depression; admits to anxiety  Hematologic/Lymphatic/Immunologic:   denies easy bruising, easy bleeding, lumps or bumps noted  Endocrine:   Denies increased thirst    Physical Exam:  BP (!) 170/64   Pulse 98   Temp 97.1  F (36.2  C) (Tympanic)   Resp 20   Ht 1.499 m (4' 11\")   Wt 44.1 kg (97 lb 3.6 oz)   SpO2 98%   BMI 19.64 kg/m      GENERAL APPEARANCE:  alert and in no acute distress.  HEENT: Normocephalic, Sclerae anicteric.  No obvious oral lesions or thrush.  NECK:   No asymmetry or masses, no thyromegaly.  LYMPHATICS: No palpable cervical, supraclavicular, axillary, or inguinal nodes   RESP: Lungs clear to auscultation bilaterally, respirations regular and easy  CARDIOVASCULAR: Regular rate and rhythm. Normal S1, S2; no murmur, " gallop, or rub.  ABDOMEN: Soft, nontender. Bowel sounds auscultated all 4 quadrants. No palpable organomegaly or masses.  MUSCULOSKELETAL: Extremities without gross deformities noted.   NEURO: Alert and oriented x 3.  Gait steady.  PSYCHIATRIC: Mentation and affect appear normal.  Mood appropriate.    Laboratory:  Results for orders placed or performed in visit on 03/16/23   Comprehensive metabolic panel     Status: Abnormal   Result Value Ref Range    Sodium 138 136 - 145 mmol/L    Potassium 4.1 3.4 - 5.3 mmol/L    Chloride 99 98 - 107 mmol/L    Carbon Dioxide (CO2) 27 22 - 29 mmol/L    Anion Gap 12 7 - 15 mmol/L    Urea Nitrogen 15.3 8.0 - 23.0 mg/dL    Creatinine 0.76 0.51 - 0.95 mg/dL    Calcium 10.0 8.8 - 10.2 mg/dL    Glucose 112 (H) 70 - 99 mg/dL    Alkaline Phosphatase 89 35 - 104 U/L    AST 34 10 - 35 U/L    ALT 23 10 - 35 U/L    Protein Total 7.0 6.4 - 8.3 g/dL    Albumin 4.3 3.5 - 5.2 g/dL    Bilirubin Total 0.4 <=1.2 mg/dL    GFR Estimate 79 >60 mL/min/1.73m2   Lactate Dehydrogenase     Status: Normal   Result Value Ref Range    Lactate Dehydrogenase 211 0 - 250 U/L   CBC with platelets and differential     Status: None   Result Value Ref Range    WBC Count 9.0 4.0 - 11.0 10e3/uL    RBC Count 4.60 3.80 - 5.20 10e6/uL    Hemoglobin 14.1 11.7 - 15.7 g/dL    Hematocrit 41.1 35.0 - 47.0 %    MCV 89 78 - 100 fL    MCH 30.7 26.5 - 33.0 pg    MCHC 34.3 31.5 - 36.5 g/dL    RDW 13.5 10.0 - 15.0 %    Platelet Count 238 150 - 450 10e3/uL    % Neutrophils 78 %    % Lymphocytes 15 %    % Monocytes 6 %    % Eosinophils 1 %    % Basophils 0 %    % Immature Granulocytes 0 %    NRBCs per 100 WBC 0 <1 /100    Absolute Neutrophils 7.0 1.6 - 8.3 10e3/uL    Absolute Lymphocytes 1.4 0.8 - 5.3 10e3/uL    Absolute Monocytes 0.6 0.0 - 1.3 10e3/uL    Absolute Eosinophils 0.1 0.0 - 0.7 10e3/uL    Absolute Basophils 0.0 0.0 - 0.2 10e3/uL    Absolute Immature Granulocytes 0.0 <=0.4 10e3/uL    Absolute NRBCs 0.0 10e3/uL   CBC with  Platelets & Differential     Status: None    Narrative    The following orders were created for panel order CBC with Platelets & Differential.  Procedure                               Abnormality         Status                     ---------                               -----------         ------                     CBC with platelets and d...[[id number]]                      Final result                 Please view results for these tests on the individual orders.       Imaging Studies: None completed for today's visit      ASSESSMENT/PLAN:    #1 CLL/SLL: Diagnosed with CLL/SLL in December 2011. Stage III. She received 6 cycles of bendamustine completing this July 2015 and is now followed with surveillance.  She is feeling good.  No concerns on lab work. She will follow up in 6 months with a CBC, CMP and LDH.     #2  Port-a-cath in place:  Discussed removal and patient would like to keep at this point.     I encouraged patient to call with any questions or concerns.    Yaneli Gray, NP  APRN, FNP-BC, AOCNP

## 2023-03-16 NOTE — PATIENT INSTRUCTIONS
We will schedule you for port flushes every 6 weeks.    We would like to see you back in 6 months. Please come 30 minutes prior for lab work.     If you have any questions please call [phone].    Other instructions:  none

## 2023-04-09 ENCOUNTER — HEALTH MAINTENANCE LETTER (OUTPATIENT)
Age: 83
End: 2023-04-09

## 2023-04-12 ENCOUNTER — OFFICE VISIT (OUTPATIENT)
Dept: FAMILY MEDICINE | Facility: OTHER | Age: 83
End: 2023-04-12
Attending: FAMILY MEDICINE
Payer: COMMERCIAL

## 2023-04-12 ENCOUNTER — TELEPHONE (OUTPATIENT)
Dept: FAMILY MEDICINE | Facility: OTHER | Age: 83
End: 2023-04-12

## 2023-04-12 VITALS
RESPIRATION RATE: 18 BRPM | WEIGHT: 96 LBS | SYSTOLIC BLOOD PRESSURE: 156 MMHG | HEART RATE: 100 BPM | DIASTOLIC BLOOD PRESSURE: 64 MMHG | BODY MASS INDEX: 19.39 KG/M2 | OXYGEN SATURATION: 97 % | TEMPERATURE: 98.3 F

## 2023-04-12 DIAGNOSIS — J01.00 ACUTE NON-RECURRENT MAXILLARY SINUSITIS: Primary | ICD-10-CM

## 2023-04-12 PROCEDURE — G0463 HOSPITAL OUTPT CLINIC VISIT: HCPCS

## 2023-04-12 PROCEDURE — 99213 OFFICE O/P EST LOW 20 MIN: CPT | Performed by: FAMILY MEDICINE

## 2023-04-12 RX ORDER — AZITHROMYCIN 250 MG/1
TABLET, FILM COATED ORAL
Qty: 6 TABLET | Refills: 0 | Status: SHIPPED | OUTPATIENT
Start: 2023-04-12 | End: 2023-06-26

## 2023-04-12 ASSESSMENT — PAIN SCALES - GENERAL: PAINLEVEL: EXTREME PAIN (8)

## 2023-04-12 NOTE — PROGRESS NOTES
Assessment & Plan     Acute non-recurrent maxillary sinusitis  ?? Part of winter mold allergy. Will treat with below and otc Claritin discussed. Symptomatic treatment was discussed along when patient should call and/or come back into the clinic or go to ER/Urgent care. All questions answered.   Symptomatic treatment was discussed along what is available for OTC medications for symptomatic relief.   - azithromycin (ZITHROMAX) 250 MG tablet; zpack As directed                 No follow-ups on file.    Zheng De La Rosa MD  [facility] - TRINA Rodriguez is a 82 year old, presenting for the following health issues:  Cough         View : No data to display.              HPI     Acute Illness  Acute illness concerns: cough, eye pain  Onset/Duration: 2 days  Symptoms:  Fever: No  Chills/Sweats: No  Headache (location?): YES  Sinus Pressure: YES  Conjunctivitis:  YES  Ear Pain: no  Rhinorrhea: YES  Congestion: No  Sore Throat: No  Cough: YES-non-productive  Wheeze: No  Decreased Appetite: No  Nausea: No  Vomiting: No  Diarrhea: No  Dysuria/Freq.: No  Dysuria or Hematuria: No  Fatigue/Achiness: No  Sick/Strep Exposure: No  Therapies tried and outcome: aleve  Has PND  Increase sinus pressure   Pain on left maxillary  and bilateral frontal region  Mild chills   No fever     Review of Systems   Constitutional, HEENT, cardiovascular, pulmonary, gi and gu systems are negative, except as otherwise noted.      Objective    BP (!) 156/64 (BP Location: Left arm, Patient Position: Sitting, Cuff Size: Adult Regular)   Pulse 100   Temp 98.3  F (36.8  C) (Tympanic)   Resp 18   Wt 43.5 kg (96 lb)   SpO2 97%   BMI 19.39 kg/m    Body mass index is 19.39 kg/m .  Physical Exam   GENERAL: healthy, alert and no distress  HENT: ear canals and TM's normal, nose and mouth without ulcers or lesions- tender over left maxillary   NECK: no adenopathy, no asymmetry, masses, or scars and thyroid normal to palpation  RESP:  lungs clear to auscultation - no rales, rhonchi or wheezes  CV: regular rate and rhythm, normal S1 S2, no S3 or S4, no murmur, click or rub, no peripheral edema and peripheral pulses strong

## 2023-04-12 NOTE — TELEPHONE ENCOUNTER
8:03 AM    Reason for Call: OVERBOOK    Patient is having the following symptoms: Headache, pressure behind eyes / cough for 2 days    The patient is requesting an appointment for today  with Dr. De La Rosa    Was an appointment offered for this call? No  If yes : Appointment type              Date    Preferred method for responding to this message: Telephone Call  What is your phone number ?  [phone]    If we cannot reach you directly, may we leave a detailed response at the number you provided? Yes    Can this message wait until your PCP/provider returns, if unavailable today? Not applicable,     Jennifer Tejeda

## 2023-04-14 ENCOUNTER — TELEPHONE (OUTPATIENT)
Dept: FAMILY MEDICINE | Facility: OTHER | Age: 83
End: 2023-04-14

## 2023-04-14 ENCOUNTER — OFFICE VISIT (OUTPATIENT)
Dept: FAMILY MEDICINE | Facility: OTHER | Age: 83
End: 2023-04-14
Attending: FAMILY MEDICINE
Payer: COMMERCIAL

## 2023-04-14 ENCOUNTER — TRANSFERRED RECORDS (OUTPATIENT)
Dept: HEALTH INFORMATION MANAGEMENT | Facility: CLINIC | Age: 83
End: 2023-04-14

## 2023-04-14 VITALS
WEIGHT: 96 LBS | HEART RATE: 85 BPM | BODY MASS INDEX: 19.35 KG/M2 | DIASTOLIC BLOOD PRESSURE: 66 MMHG | SYSTOLIC BLOOD PRESSURE: 148 MMHG | OXYGEN SATURATION: 97 % | TEMPERATURE: 99 F | HEIGHT: 59 IN

## 2023-04-14 DIAGNOSIS — B02.30 HERPES ZOSTER WITH OPHTHALMIC COMPLICATION, UNSPECIFIED HERPES ZOSTER EYE DISEASE: Primary | ICD-10-CM

## 2023-04-14 DIAGNOSIS — B02.8 HERPES ZOSTER WITH COMPLICATION: ICD-10-CM

## 2023-04-14 PROCEDURE — G0463 HOSPITAL OUTPT CLINIC VISIT: HCPCS

## 2023-04-14 PROCEDURE — 99213 OFFICE O/P EST LOW 20 MIN: CPT | Performed by: FAMILY MEDICINE

## 2023-04-14 RX ORDER — ACYCLOVIR 800 MG/1
800 TABLET ORAL
Qty: 35 TABLET | Refills: 0 | Status: CANCELLED | OUTPATIENT
Start: 2023-04-14 | End: 2023-04-21

## 2023-04-14 RX ORDER — TRAMADOL HYDROCHLORIDE 50 MG/1
TABLET ORAL
Qty: 18 TABLET | Refills: 0 | Status: SHIPPED | OUTPATIENT
Start: 2023-04-14 | End: 2023-06-26

## 2023-04-14 RX ORDER — FAMCICLOVIR 500 MG/1
500 TABLET ORAL 3 TIMES DAILY
Qty: 21 TABLET | Refills: 0 | Status: SHIPPED | OUTPATIENT
Start: 2023-04-14 | End: 2023-06-26

## 2023-04-14 ASSESSMENT — PAIN SCALES - GENERAL: PAINLEVEL: EXTREME PAIN (8)

## 2023-04-14 NOTE — TELEPHONE ENCOUNTER
To: Nurse to Dr. De La Rosa  Patient would like to speak with you regarding possible reaction to medication Azithromytin. She did not want to speak with triage/med nurse.  Her phone is [phone]  Thank you

## 2023-04-14 NOTE — PROGRESS NOTES
Assessment & Plan      1. Herpes zoster with ophthalmic complication, unspecified herpes zoster eye disease  Patient has vesicular lesions over left eyelid and left side of face extending in V1 distribution with burning, headache, and eye pain most consistent with herpes zoster rash. Black light with dye on left eye showed no dendritic lesions. Would like opthalmology to follow up though. Scheduled her appointment with [facility] immediately after this visit for further evaluation of potential opthalmicus. Discussed to take Famvir to help with zoster and tramadol to help with pain. If she worsens or does not resolve she should return to office or go to urgent care. She should stop zithromax immediately. She left under own ambulation with no further questions or concerns.  - Adult Eye  Referral; Future  - famciclovir (FAMVIR) 500 MG tablet; Take 1 tablet (500 mg) by mouth 3 times daily for 7 days  Dispense: 21 tablet; Refill: 0  - traMADol (ULTRAM) 50 MG tablet; 1-2 tablets orally every 6-8 hrs as needed for pain  Dispense: 18 tablet; Refill: 0    2. Herpes zoster with complication  See above      Jamel Porter MS3    I was present with the medical student for the service. I personally verified the history of present illness and  performed the physical examination and medical decision making. I have verified all of the medical student s  documentation for this encounter. Dr. Zheng De La Rosa MD  [facility] - TRINA Rodriguez is a 82 year old, presenting for the following health issues:  Rash    Headache last night off and on all night. Never had one like it before in terms of pain. Feels like a throbbing pain. Hurts in the back when moving head.    Rash - Thinks it started Monday-Tuesday. Burning sensation.     Vision - Eye pain when looking up. Vision is ok.     Has had bother shingles shots. Has never had shingles before.    History of CLL.        4/12/2023  "    8:53 AM   Additional Questions   Roomed by jamee ELIZONDO     Rash  Onset/Duration: 1 day  Description  Location: forehead into hair line   Character: burning, red  Itching: no  Intensity:  moderate  Progression of Symptoms:  worsening and same  Accompanying signs and symptoms:   Fever: No  Body aches or joint pain: No  Sore throat symptoms: No  Recent cold symptoms: YES- runny nose headache, pain above forehead   History:           Previous episodes of similar rash: None  New exposures:  None  Recent travel: No  Exposure to similar rash: No  Precipitating or alleviating factors: none   Therapies tried and outcome: Claritin/loratadine -  not effective    Review of Systems   Constitutional, HEENT, cardiovascular, pulmonary, GI, , musculoskeletal, neuro, skin, endocrine and psych systems are negative, except as otherwise noted.      Objective    BP (!) 149/82 (BP Location: Right arm, Patient Position: Sitting, Cuff Size: Adult Regular)   Pulse 85   Temp 99  F (37.2  C) (Tympanic)   Ht 1.499 m (4' 11\")   Wt 43.5 kg (96 lb)   SpO2 97%   BMI 19.39 kg/m    Body mass index is 19.39 kg/m .  Physical Exam   GENERAL: healthy, alert and no distress  EYES: Eyes grossly normal to inspection, PERRL and conjunctivae and sclerae normal. No conjunctival injection.   HENT: ear canals and TM's normal, nose and mouth without ulcers or lesions  NECK: no adenopathy, no asymmetry, masses, or scars and thyroid normal to palpation  RESP: lungs clear to auscultation - no rales, rhonchi or wheezes  CV: regular rate and rhythm, normal S1 S2, no S3 or S4, no murmur, click or rub, no peripheral edema and peripheral pulses strong  ABDOMEN: soft, nontender, no hepatosplenomegaly, no masses and bowel sounds normal  MS: no gross musculoskeletal defects noted, no edema  SKIN: Rash in V1 distrobution over left side of face on scalp going to left upper eyelid.   NEURO: Normal strength and tone, mentation intact and speech " normal  PSYCH: mentation appears normal, affect normal/bright

## 2023-04-14 NOTE — TELEPHONE ENCOUNTER
Patient stated she had a terrible headache last night and has had chills. Has a rash on forehead. Did take 2 pills Wednesday and 1 pill Thursday. Does have a HX of leukemia and is concerned about this zpack.      Can she take something aside from the zpack or should she go to eye doctor?

## 2023-04-20 ENCOUNTER — TELEPHONE (OUTPATIENT)
Dept: FAMILY MEDICINE | Facility: OTHER | Age: 83
End: 2023-04-20

## 2023-04-20 NOTE — TELEPHONE ENCOUNTER
9:33 AM    Reason for Call: Phone Call    Description: Patient has questions regarding her diagnoses, please return her call.    Was an appointment offered for this call? No      Preferred method for responding to this message: Telephone Call  What is your phone number ? [phone]    If we cannot reach you directly, may we leave a detailed response at the number you provided? Yes    Can this message wait until your PCP/provider returns, if available today? Not applicable    Jahaira Olivares

## 2023-04-24 ENCOUNTER — MYC MEDICAL ADVICE (OUTPATIENT)
Dept: ONCOLOGY | Facility: OTHER | Age: 83
End: 2023-04-24

## 2023-04-24 DIAGNOSIS — C91.10 CLL (CHRONIC LYMPHOCYTIC LEUKEMIA) (H): Primary | ICD-10-CM

## 2023-04-27 ENCOUNTER — INFUSION THERAPY VISIT (OUTPATIENT)
Dept: INFUSION THERAPY | Facility: OTHER | Age: 83
End: 2023-04-27
Attending: FAMILY MEDICINE
Payer: COMMERCIAL

## 2023-04-27 DIAGNOSIS — C91.10 CHRONIC LYMPHOCYTIC LEUKEMIA (H): Primary | ICD-10-CM

## 2023-04-27 PROCEDURE — 96523 IRRIG DRUG DELIVERY DEVICE: CPT

## 2023-04-27 PROCEDURE — 250N000011 HC RX IP 250 OP 636: Performed by: INTERNAL MEDICINE

## 2023-04-27 RX ORDER — HEPARIN SODIUM (PORCINE) LOCK FLUSH IV SOLN 100 UNIT/ML 100 UNIT/ML
500 SOLUTION INTRAVENOUS EVERY 8 HOURS
Status: DISCONTINUED | OUTPATIENT
Start: 2023-04-27 | End: 2023-04-27 | Stop reason: HOSPADM

## 2023-04-27 RX ORDER — HEPARIN SODIUM (PORCINE) LOCK FLUSH IV SOLN 100 UNIT/ML 100 UNIT/ML
500 SOLUTION INTRAVENOUS EVERY 8 HOURS
Status: CANCELLED
Start: 2023-04-27

## 2023-04-27 RX ADMIN — HEPARIN 500 UNITS: 100 SYRINGE at 08:53

## 2023-04-27 NOTE — PROGRESS NOTES
Patient given the flyer for the Port flush study.  She will review and reach out if she is interested.  She is uncertain at this time as she comes every 6 weeks now and that works very well for her.

## 2023-04-27 NOTE — PROGRESS NOTES
Patients port accessed using non-coring, 19 gauge, 3/4 needle, per facility protocol.     Hand hygiene performed: yes   Mask donned by caregiver: yes  Site prepped with CHG: yes   Labs drawn: no   Dressing applied using aseptic technique: yes     Port flushed easily, without resistance. Flushed with 10 cc's normal saline.   Immediate blood return noted. Port flushed per orders/MAR. Needle removed intact, sterile dressing applied.  Slight pressure applied for 30 seconds.   Patient tolerated port flush well, denies pain nor discomfort at this time. Patient instructed to leave dressing intact for a minimum of one hour, and to call with questions or concerns.  Patient states understanding and is in agreement with this plan. Patient discharged.

## 2023-05-10 ENCOUNTER — MYC MEDICAL ADVICE (OUTPATIENT)
Dept: FAMILY MEDICINE | Facility: OTHER | Age: 83
End: 2023-05-10

## 2023-05-10 ENCOUNTER — HOSPITAL ENCOUNTER (EMERGENCY)
Facility: HOSPITAL | Age: 83
Discharge: HOME | End: 2023-05-10
Attending: STUDENT IN AN ORGANIZED HEALTH CARE EDUCATION/TRAINING PROGRAM | Admitting: STUDENT IN AN ORGANIZED HEALTH CARE EDUCATION/TRAINING PROGRAM
Payer: COMMERCIAL

## 2023-05-10 VITALS
SYSTOLIC BLOOD PRESSURE: 151 MMHG | OXYGEN SATURATION: 96 % | DIASTOLIC BLOOD PRESSURE: 76 MMHG | TEMPERATURE: 97.1 F | HEART RATE: 86 BPM | RESPIRATION RATE: 18 BRPM

## 2023-05-10 DIAGNOSIS — M62.81 GENERALIZED MUSCLE WEAKNESS: ICD-10-CM

## 2023-05-10 LAB
ABO/RH(D): NORMAL
ALBUMIN UR-MCNC: 10 MG/DL
ANION GAP SERPL CALCULATED.3IONS-SCNC: 10 MMOL/L (ref 7–15)
ANTIBODY SCREEN: NEGATIVE
APPEARANCE UR: CLEAR
BACTERIA #/AREA URNS HPF: ABNORMAL /HPF
BASOPHILS # BLD AUTO: 0 10E3/UL (ref 0–0.2)
BASOPHILS NFR BLD AUTO: 0 %
BILIRUB UR QL STRIP: NEGATIVE
BUN SERPL-MCNC: 21.9 MG/DL (ref 8–23)
CALCIUM SERPL-MCNC: 9.9 MG/DL (ref 8.8–10.2)
CHLORIDE SERPL-SCNC: 97 MMOL/L (ref 98–107)
COLOR UR AUTO: YELLOW
CREAT SERPL-MCNC: 0.92 MG/DL (ref 0.51–0.95)
DEPRECATED HCO3 PLAS-SCNC: 29 MMOL/L (ref 22–29)
EOSINOPHIL # BLD AUTO: 0 10E3/UL (ref 0–0.7)
EOSINOPHIL NFR BLD AUTO: 1 %
ERYTHROCYTE [DISTWIDTH] IN BLOOD BY AUTOMATED COUNT: 14.9 % (ref 10–15)
GFR SERPL CREATININE-BSD FRML MDRD: 63 ML/MIN/1.73M2
GLUCOSE SERPL-MCNC: 163 MG/DL (ref 70–99)
GLUCOSE UR STRIP-MCNC: NEGATIVE MG/DL
HCT VFR BLD AUTO: 43.7 % (ref 35–47)
HGB BLD-MCNC: 15 G/DL (ref 11.7–15.7)
HGB UR QL STRIP: NEGATIVE
HYALINE CASTS: 5 /LPF
IMM GRANULOCYTES # BLD: 0 10E3/UL
IMM GRANULOCYTES NFR BLD: 0 %
KETONES UR STRIP-MCNC: NEGATIVE MG/DL
LEUKOCYTE ESTERASE UR QL STRIP: NEGATIVE
LYMPHOCYTES # BLD AUTO: 1.2 10E3/UL (ref 0.8–5.3)
LYMPHOCYTES NFR BLD AUTO: 16 %
MCH RBC QN AUTO: 31.3 PG (ref 26.5–33)
MCHC RBC AUTO-ENTMCNC: 34.3 G/DL (ref 31.5–36.5)
MCV RBC AUTO: 91 FL (ref 78–100)
MONOCYTES # BLD AUTO: 0.4 10E3/UL (ref 0–1.3)
MONOCYTES NFR BLD AUTO: 6 %
MUCOUS THREADS #/AREA URNS LPF: PRESENT /LPF
NEUTROPHILS # BLD AUTO: 5.7 10E3/UL (ref 1.6–8.3)
NEUTROPHILS NFR BLD AUTO: 77 %
NITRATE UR QL: NEGATIVE
NRBC # BLD AUTO: 0 10E3/UL
NRBC BLD AUTO-RTO: 0 /100
PH UR STRIP: 6 [PH] (ref 4.7–8)
PLATELET # BLD AUTO: 234 10E3/UL (ref 150–450)
POTASSIUM SERPL-SCNC: 3.8 MMOL/L (ref 3.4–5.3)
RBC # BLD AUTO: 4.8 10E6/UL (ref 3.8–5.2)
RBC URINE: 1 /HPF
SODIUM SERPL-SCNC: 136 MMOL/L (ref 136–145)
SP GR UR STRIP: 1.02 (ref 1–1.03)
SPECIMEN EXPIRATION DATE: NORMAL
SQUAMOUS EPITHELIAL: 0 /HPF
TSH SERPL DL<=0.005 MIU/L-ACNC: 2.62 UIU/ML (ref 0.3–4.2)
UROBILINOGEN UR STRIP-MCNC: NORMAL MG/DL
WBC # BLD AUTO: 7.4 10E3/UL (ref 4–11)
WBC URINE: 1 /HPF

## 2023-05-10 PROCEDURE — 86901 BLOOD TYPING SEROLOGIC RH(D): CPT | Performed by: STUDENT IN AN ORGANIZED HEALTH CARE EDUCATION/TRAINING PROGRAM

## 2023-05-10 PROCEDURE — 81001 URINALYSIS AUTO W/SCOPE: CPT | Performed by: STUDENT IN AN ORGANIZED HEALTH CARE EDUCATION/TRAINING PROGRAM

## 2023-05-10 PROCEDURE — 258N000003 HC RX IP 258 OP 636: Performed by: STUDENT IN AN ORGANIZED HEALTH CARE EDUCATION/TRAINING PROGRAM

## 2023-05-10 PROCEDURE — 85018 HEMOGLOBIN: CPT | Performed by: STUDENT IN AN ORGANIZED HEALTH CARE EDUCATION/TRAINING PROGRAM

## 2023-05-10 PROCEDURE — 93005 ELECTROCARDIOGRAM TRACING: CPT

## 2023-05-10 PROCEDURE — 99284 EMERGENCY DEPT VISIT MOD MDM: CPT | Performed by: STUDENT IN AN ORGANIZED HEALTH CARE EDUCATION/TRAINING PROGRAM

## 2023-05-10 PROCEDURE — 82310 ASSAY OF CALCIUM: CPT | Performed by: STUDENT IN AN ORGANIZED HEALTH CARE EDUCATION/TRAINING PROGRAM

## 2023-05-10 PROCEDURE — 96360 HYDRATION IV INFUSION INIT: CPT

## 2023-05-10 PROCEDURE — 99284 EMERGENCY DEPT VISIT MOD MDM: CPT | Mod: 25

## 2023-05-10 PROCEDURE — 36415 COLL VENOUS BLD VENIPUNCTURE: CPT | Performed by: STUDENT IN AN ORGANIZED HEALTH CARE EDUCATION/TRAINING PROGRAM

## 2023-05-10 PROCEDURE — 84443 ASSAY THYROID STIM HORMONE: CPT | Performed by: STUDENT IN AN ORGANIZED HEALTH CARE EDUCATION/TRAINING PROGRAM

## 2023-05-10 PROCEDURE — 86850 RBC ANTIBODY SCREEN: CPT | Performed by: STUDENT IN AN ORGANIZED HEALTH CARE EDUCATION/TRAINING PROGRAM

## 2023-05-10 PROCEDURE — 93010 ELECTROCARDIOGRAM REPORT: CPT | Performed by: INTERNAL MEDICINE

## 2023-05-10 RX ADMIN — SODIUM CHLORIDE 1000 ML: 9 INJECTION, SOLUTION INTRAVENOUS at 07:55

## 2023-05-10 NOTE — TELEPHONE ENCOUNTER
I spoke with pt - she does not have much of PHN-- pain is from eye drops. She is working with her eye doctor.

## 2023-05-10 NOTE — ED PROVIDER NOTES
History     Chief Complaint   Patient presents with     Generalized Weakness     HPI  Jennifer Barajas is a 82 year old female with history relevant for CLL L previously requiring transfusions who presents to the emergency department today complaining of weakness.  She denies fevers.  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  She states that she came to the emergency department today because she did not bring out of bed today like she was hoping she would.  She notes that she did not do so yesterday either and that it is probably been about a month since she has.  Denies headache neck pain chest pain shortness of breath abdominal pain nausea vomiting diarrhea hematemesis melena rashes urinary symptoms or other complaints or symptoms  Incidentally she also complains of ongoing eye pain, previously diagnosed herpes zoster ophthalmicus, which she is compliant with her medications for it is being managed by the ophthalmologist at [facility].    Allergies:  Allergies   Allergen Reactions     Sulfa Antibiotics Rash     Rituxan [Rituximab]      Simvastatin Other (See Comments)     Takes pravastatin at home  Zocor - myalgia       Problem List:    Patient Active Problem List    Diagnosis Date Noted     Non-toxic multinodular goiter 11/08/2016     Priority: Medium     Mixed hyperlipidemia 11/08/2016     Priority: Medium     Advanced care planning/counseling discussion 12/17/2012     Priority: Medium     Chronic lymphocytic leukemia (H) 02/28/2012     Priority: Medium     updating diagnosis code for icd10 cutover       Diverticulosis of large intestine 11/29/2010     Priority: Medium     Problem list name updated by automated process. Provider to review       Osteoporosis 11/29/2010     Priority: Medium     Problem list name updated by automated process. Provider to review          Past Medical History:    Past Medical History:    Diagnosis Date     Arthritis      Cancer (H)      Chronic lymphoid leukemia, 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 2/28/2012     Diverticulosis of colon (without mention of hemorrhage) 11/29/2010     Family history of ischemic heart disease 11/29/2010     Family history of malignant neoplasm of gastrointestinal tract 10/14/2002     History of blood transfusion      Non-toxic multinodular goiter 11/8/2016     Osteoporosis, unspecified 11/29/2010     Other and unspecified hyperlipidemia 11/28/2011     Viral warts, unspecified 11/28/2011       Past Surgical History:    Past Surgical History:   Procedure Laterality Date     ------------OTHER-------------      elbow repair     COLONOSCOPY  01/12/2010    repeat in 2015     COLONOSCOPY      SEVERAL COLONOSCOPIES - ALL BEEN NORMAL - REFUSES ANY MORE      COLONOSCOPY       INSERT PORT VASCULAR ACCESS N/A 1/5/2015    Procedure: INSERT PORT VASCULAR ACCESS;  Surgeon: Linda Oliver MD;  Location: [facility]     IR PORT CHECK LEFT  5/20/2020     ORTHOPEDIC SURGERY  1993    Left elbow repair       Family History:    Family History   Problem Relation Age of Onset     Cancer Mother 97        colon cancer - cause of death     Colon Cancer Mother      Cancer Father      Colon Cancer Father      Diabetes Brother      Obesity Brother      Breast Cancer Daughter      Hyperlipidemia Daughter      Thyroid Disease Daughter      Hypertension Son      Hyperlipidemia Son      Thyroid Disease Cousin      Hyperlipidemia Son      Coronary Artery Disease No family hx of      Cerebrovascular Disease No family hx of      Prostate Cancer No family hx of      Other Cancer No family hx of      Anesthesia Reaction No family hx of      Asthma No family hx of      Genetic Disorder No family hx of        Social History:  Marital Status:   [4]  Social History     Tobacco Use     Smoking status: Never     Smokeless tobacco: Never   Vaping Use     Vaping status: Never Used    Substance Use Topics     Alcohol use: No     Drug use: No        Medications:    Ascorbic Acid (VITAMIN C PO)  azithromycin (ZITHROMAX) 250 MG tablet  Cholecalciferol (VITAMIN D3 PO)  famciclovir (FAMVIR) 500 MG tablet  lidocaine-prilocaine (EMLA) cream  Multiple Vitamins-Minerals (CENTRUM SILVER 50+WOMEN PO)  traMADol (ULTRAM) 50 MG tablet          Review of Systems  A complete review of systems was performed and is otherwise negative.     Physical Exam   BP: 132/81  Pulse: 110  Temp: 97.1  F (36.2  C)  Resp: 18  SpO2: 99 %      Physical Exam  Constitutional: Alert and conversant. NAD   HENT: NCAT   Eyes: Normal pupils   Neck: supple   CV: minimally tachycardic rate, regular rhythm, no murmur   Pulmonary/Chest: Non-labored respirations, clear to auscultation bilaterally   Abdominal: Soft, non-tender, non-distended   MSK: [place].   Neuro: Alert and appropriate   Skin: Warm and dry. No diaphoresis. No rashes on exposed skin    Psych: Appropriate mood and affect     ED Course              ED Course as of 05/10/23 0854   Wed May 10, 2023   0752 82-year-old female here with weakness.  History of CLL and shingles over left V1.  Presentation is quite nonspecific, mostly feeling of weakness that is been fairly persistent over the last month with no acute concerning changes.  Her son is in the room and confirms that she does live alone but continues to complete her activities of daily living and he has not really noticed any specific changes.  Basic laboratory evaluation underway.  Benign abdominal exam.  No cough or fever.  No symptoms to indicate any specific form of imaging.  Vitals largely acceptable initially tachycardic, but that seems to have come down as she relaxed.  IV established fluids underway laboratory evaluation underway   0824 Hemoglobin: 15.0  Hemoglobin is normal   0825 WBC: 7.4  No leukocytosis to suggest an infectious etiology   0825 Leukocyte Esterase Urine: Negative  No UTI   0853 No concerning  electrolyte abnormalities.  TSH is normal.  EKG without signs of acute ischemia or arrhythmia.   0853 Negative work-up.  No indication for further evaluation.  Patient overall appears well and appropriate for the outpatient management discharged in stable condition all question answered and return precautions given.     Procedures              Results for orders placed or performed during the hospital encounter of 05/10/23 (from the past 24 hour(s))   CBC with platelets differential    Narrative    The following orders were created for panel order CBC with platelets differential.  Procedure                               Abnormality         Status                     ---------                               -----------         ------                     CBC with platelets and d...[[id number]]                      Final result                 Please view results for these tests on the individual orders.   ABO/Rh type and screen    Narrative    The following orders were created for panel order ABO/Rh type and screen.  Procedure                               Abnormality         Status                     ---------                               -----------         ------                     Adult Type and Screen[[id number]]                            Preliminary result           Please view results for these tests on the individual orders.   Basic metabolic panel   Result Value Ref Range    Sodium 136 136 - 145 mmol/L    Potassium 3.8 3.4 - 5.3 mmol/L    Chloride 97 (L) 98 - 107 mmol/L    Carbon Dioxide (CO2) 29 22 - 29 mmol/L    Anion Gap 10 7 - 15 mmol/L    Urea Nitrogen 21.9 8.0 - 23.0 mg/dL    Creatinine 0.92 0.51 - 0.95 mg/dL    Calcium 9.9 8.8 - 10.2 mg/dL    Glucose 163 (H) 70 - 99 mg/dL    GFR Estimate 63 >60 mL/min/1.73m2   TSH with free T4 reflex   Result Value Ref Range    TSH 2.62 0.30 - 4.20 uIU/mL   CBC with platelets and differential   Result Value Ref Range    WBC Count 7.4 4.0 - 11.0 10e3/uL    RBC Count  4.80 3.80 - 5.20 10e6/uL    Hemoglobin 15.0 11.7 - 15.7 g/dL    Hematocrit 43.7 35.0 - 47.0 %    MCV 91 78 - 100 fL    MCH 31.3 26.5 - 33.0 pg    MCHC 34.3 31.5 - 36.5 g/dL    RDW 14.9 10.0 - 15.0 %    Platelet Count 234 150 - 450 10e3/uL    % Neutrophils 77 %    % Lymphocytes 16 %    % Monocytes 6 %    % Eosinophils 1 %    % Basophils 0 %    % Immature Granulocytes 0 %    NRBCs per 100 WBC 0 <1 /100    Absolute Neutrophils 5.7 1.6 - 8.3 10e3/uL    Absolute Lymphocytes 1.2 0.8 - 5.3 10e3/uL    Absolute Monocytes 0.4 0.0 - 1.3 10e3/uL    Absolute Eosinophils 0.0 0.0 - 0.7 10e3/uL    Absolute Basophils 0.0 0.0 - 0.2 10e3/uL    Absolute Immature Granulocytes 0.0 <=0.4 10e3/uL    Absolute NRBCs 0.0 10e3/uL   Adult Type and Screen   Result Value Ref Range    ABO/RH(D) O POS     SPECIMEN EXPIRATION DATE [id number]    UA with Microscopic reflex to Culture    Specimen: Urine, Midstream   Result Value Ref Range    Color Urine Yellow Colorless, Straw, Light Yellow, Yellow    Appearance Urine Clear Clear    Glucose Urine Negative Negative mg/dL    Bilirubin Urine Negative Negative    Ketones Urine Negative Negative mg/dL    Specific Gravity Urine 1.021 1.003 - 1.035    Blood Urine Negative Negative    pH Urine 6.0 4.7 - 8.0    Protein Albumin Urine 10 (A) Negative mg/dL    Urobilinogen Urine Normal Normal, 2.0 mg/dL    Nitrite Urine Negative Negative    Leukocyte Esterase Urine Negative Negative    Bacteria Urine Few (A) None Seen /HPF    Mucus Urine Present (A) None Seen /LPF    RBC Urine 1 <=2 /HPF    WBC Urine 1 <=5 /HPF    Squamous Epithelials Urine 0 <=1 /HPF    Hyaline Casts Urine 5 (H) <=2 /LPF    Narrative    Urine Culture not indicated       Medications   0.9% sodium chloride BOLUS (0 mLs Intravenous Stopped 5/10/23 4129)       Assessments & Plan (with Medical Decision Making)     I have reviewed the nursing notes.    I have reviewed the findings, diagnosis, plan and need for follow up with the patient.    New  Prescriptions    No medications on file       Final diagnoses:   Generalized muscle weakness       5/10/2023   [facility]     Gian Cardenas MD  05/10/23 0822

## 2023-05-10 NOTE — ED TRIAGE NOTES
Patient has been feeling weak. Concerned about low hgb. Has had shingles on the left side of the face and into the eye over the last month. Eye burns after the drops are placed in. Pain in the eye is 10/10

## 2023-06-08 ENCOUNTER — INFUSION THERAPY VISIT (OUTPATIENT)
Dept: INFUSION THERAPY | Facility: OTHER | Age: 83
End: 2023-06-08
Attending: FAMILY MEDICINE
Payer: COMMERCIAL

## 2023-06-08 DIAGNOSIS — C91.10 CHRONIC LYMPHOCYTIC LEUKEMIA (H): Primary | ICD-10-CM

## 2023-06-08 PROCEDURE — 250N000011 HC RX IP 250 OP 636: Performed by: INTERNAL MEDICINE

## 2023-06-08 PROCEDURE — 96523 IRRIG DRUG DELIVERY DEVICE: CPT

## 2023-06-08 RX ORDER — HEPARIN SODIUM (PORCINE) LOCK FLUSH IV SOLN 100 UNIT/ML 100 UNIT/ML
500 SOLUTION INTRAVENOUS EVERY 8 HOURS
Status: DISCONTINUED | OUTPATIENT
Start: 2023-06-08 | End: 2023-06-08 | Stop reason: HOSPADM

## 2023-06-08 RX ORDER — HEPARIN SODIUM (PORCINE) LOCK FLUSH IV SOLN 100 UNIT/ML 100 UNIT/ML
500 SOLUTION INTRAVENOUS EVERY 8 HOURS
Status: CANCELLED
Start: 2023-06-08

## 2023-06-08 RX ADMIN — Medication 500 UNITS: at 08:35

## 2023-06-26 ENCOUNTER — OFFICE VISIT (OUTPATIENT)
Dept: FAMILY MEDICINE | Facility: OTHER | Age: 83
End: 2023-06-26
Attending: FAMILY MEDICINE
Payer: COMMERCIAL

## 2023-06-26 VITALS
DIASTOLIC BLOOD PRESSURE: 64 MMHG | SYSTOLIC BLOOD PRESSURE: 144 MMHG | RESPIRATION RATE: 16 BRPM | HEART RATE: 94 BPM | WEIGHT: 98 LBS | BODY MASS INDEX: 19.79 KG/M2 | TEMPERATURE: 96.4 F | OXYGEN SATURATION: 98 %

## 2023-06-26 DIAGNOSIS — C91.10 CHRONIC LYMPHOCYTIC LEUKEMIA (H): Primary | ICD-10-CM

## 2023-06-26 DIAGNOSIS — E04.2 NON-TOXIC MULTINODULAR GOITER: ICD-10-CM

## 2023-06-26 DIAGNOSIS — M81.0 AGE-RELATED OSTEOPOROSIS WITHOUT CURRENT PATHOLOGICAL FRACTURE: ICD-10-CM

## 2023-06-26 DIAGNOSIS — E78.2 MIXED HYPERLIPIDEMIA: ICD-10-CM

## 2023-06-26 DIAGNOSIS — B02.30 HERPES ZOSTER WITH OPHTHALMIC COMPLICATION, UNSPECIFIED HERPES ZOSTER EYE DISEASE: ICD-10-CM

## 2023-06-26 PROCEDURE — 99214 OFFICE O/P EST MOD 30 MIN: CPT | Performed by: FAMILY MEDICINE

## 2023-06-26 PROCEDURE — G0463 HOSPITAL OUTPT CLINIC VISIT: HCPCS

## 2023-06-26 ASSESSMENT — PATIENT HEALTH QUESTIONNAIRE - PHQ9: SUM OF ALL RESPONSES TO PHQ QUESTIONS 1-9: 0

## 2023-06-26 ASSESSMENT — PAIN SCALES - GENERAL: PAINLEVEL: NO PAIN (0)

## 2023-06-26 NOTE — PROGRESS NOTES
Assessment & Plan     Chronic lymphocytic leukemia (H)  Stable/ in remission. Will follow with Hem/Onc    Herpes zoster with ophthalmic complication, unspecified herpes zoster eye disease  Doing better. Some neuropathic pain but controlled with OTC agents     Age-related osteoporosis without current pathological fracture  Pt is on Vit D and Ca.   Very active. Was on Fosamax for years     Mixed hyperlipidemia  Not tolerate statins. Will not recheck. Watch diet     Non-toxic multinodular goiter  No issues. Recent TSH ok               Follow-up in a year     No follow-ups on file.    Zheng De La Rosa MD  [facility] - TRINA Rodriguez is a 82 year old, presenting for the following health issues:  Recheck Medication        4/14/2023     9:25 AM   Additional Questions   Roomed by Kacie KAT   Accompanied by self     HPI     Has had recent labs   Sees Hem/Onc  Seen in ER  Recovering from Shingles - mild nerve pain     Hyperlipidemia Follow-Up      Are you regularly taking any medication or supplement to lower your cholesterol?   No    Are you having muscle aches or other side effects that you think could be caused by your cholesterol lowering medication?  No      Review of Systems   Constitutional, HEENT, cardiovascular, pulmonary, gi and gu systems are negative, except as otherwise noted.      Objective    BP (!) 144/64   Pulse 94   Temp (!) 96.4  F (35.8  C) (Tympanic)   Resp 16   Wt 44.5 kg (98 lb)   SpO2 98%   BMI 19.79 kg/m    Body mass index is 19.79 kg/m .  Physical Exam   GENERAL: healthy, alert and no distress  EYES: Eyes grossly normal to inspection, PERRL and conjunctivae and sclerae normal  HENT: ear canals and TM's normal, nose and mouth without ulcers or lesions  NECK: no adenopathy, no asymmetry, masses, or scars and thyroid normal to palpation  RESP: lungs clear to auscultation - no rales, rhonchi or wheezes  CV: regular rate and rhythm, normal S1 S2, no S3 or S4, no murmur,  click or rub, no peripheral edema and peripheral pulses strong  ABDOMEN: soft, nontender, no hepatosplenomegaly, no masses and bowel sounds normal  MS: no gross musculoskeletal defects noted, no edema  PSYCH: mentation appears normal, affect normal/bright  LYMPH: no cervical, supraclavicular, axillary, or inguinal adenopathy

## 2023-07-20 ENCOUNTER — INFUSION THERAPY VISIT (OUTPATIENT)
Dept: INFUSION THERAPY | Facility: OTHER | Age: 83
End: 2023-07-20
Attending: FAMILY MEDICINE
Payer: COMMERCIAL

## 2023-07-20 DIAGNOSIS — C91.10 CHRONIC LYMPHOCYTIC LEUKEMIA (H): Primary | ICD-10-CM

## 2023-07-20 PROCEDURE — 96523 IRRIG DRUG DELIVERY DEVICE: CPT

## 2023-07-20 PROCEDURE — 250N000011 HC RX IP 250 OP 636: Mod: JZ | Performed by: INTERNAL MEDICINE

## 2023-07-20 RX ORDER — HEPARIN SODIUM (PORCINE) LOCK FLUSH IV SOLN 100 UNIT/ML 100 UNIT/ML
500 SOLUTION INTRAVENOUS EVERY 8 HOURS
Status: DISCONTINUED | OUTPATIENT
Start: 2023-07-20 | End: 2023-07-20 | Stop reason: HOSPADM

## 2023-07-20 RX ORDER — HEPARIN SODIUM (PORCINE) LOCK FLUSH IV SOLN 100 UNIT/ML 100 UNIT/ML
500 SOLUTION INTRAVENOUS EVERY 8 HOURS
Status: CANCELLED
Start: 2023-07-20

## 2023-07-20 RX ADMIN — Medication 500 UNITS: at 08:07

## 2023-07-20 NOTE — PATIENT INSTRUCTIONS

## 2023-08-31 ENCOUNTER — INFUSION THERAPY VISIT (OUTPATIENT)
Dept: INFUSION THERAPY | Facility: OTHER | Age: 83
End: 2023-08-31
Attending: FAMILY MEDICINE
Payer: COMMERCIAL

## 2023-08-31 DIAGNOSIS — C91.10 CHRONIC LYMPHOCYTIC LEUKEMIA (H): Primary | ICD-10-CM

## 2023-08-31 PROCEDURE — 96523 IRRIG DRUG DELIVERY DEVICE: CPT

## 2023-08-31 PROCEDURE — 250N000011 HC RX IP 250 OP 636: Mod: JZ | Performed by: INTERNAL MEDICINE

## 2023-08-31 RX ORDER — HEPARIN SODIUM (PORCINE) LOCK FLUSH IV SOLN 100 UNIT/ML 100 UNIT/ML
500 SOLUTION INTRAVENOUS EVERY 8 HOURS
Status: CANCELLED
Start: 2023-08-31

## 2023-08-31 RX ORDER — HEPARIN SODIUM (PORCINE) LOCK FLUSH IV SOLN 100 UNIT/ML 100 UNIT/ML
500 SOLUTION INTRAVENOUS EVERY 8 HOURS
Status: DISCONTINUED | OUTPATIENT
Start: 2023-08-31 | End: 2023-08-31 | Stop reason: HOSPADM

## 2023-08-31 RX ADMIN — HEPARIN 500 UNITS: 100 SYRINGE at 08:33

## 2023-09-21 ENCOUNTER — ONCOLOGY VISIT (OUTPATIENT)
Dept: ONCOLOGY | Facility: OTHER | Age: 83
End: 2023-09-21
Attending: FAMILY MEDICINE
Payer: COMMERCIAL

## 2023-09-21 VITALS
WEIGHT: 99.65 LBS | BODY MASS INDEX: 20.09 KG/M2 | HEART RATE: 96 BPM | TEMPERATURE: 98.2 F | SYSTOLIC BLOOD PRESSURE: 140 MMHG | OXYGEN SATURATION: 98 % | DIASTOLIC BLOOD PRESSURE: 78 MMHG | RESPIRATION RATE: 20 BRPM | HEIGHT: 59 IN

## 2023-09-21 DIAGNOSIS — B02.30 HERPES ZOSTER WITH OPHTHALMIC COMPLICATION, UNSPECIFIED HERPES ZOSTER EYE DISEASE: ICD-10-CM

## 2023-09-21 DIAGNOSIS — C91.11 CHRONIC LYMPHOCYTIC LEUKEMIA IN REMISSION (H): Primary | ICD-10-CM

## 2023-09-21 DIAGNOSIS — Z95.828 PORT-A-CATH IN PLACE: ICD-10-CM

## 2023-09-21 DIAGNOSIS — C91.10 CHRONIC LYMPHOCYTIC LEUKEMIA (H): Primary | ICD-10-CM

## 2023-09-21 DIAGNOSIS — C91.10 CLL (CHRONIC LYMPHOCYTIC LEUKEMIA) (H): ICD-10-CM

## 2023-09-21 LAB
ALBUMIN SERPL BCG-MCNC: 4.6 G/DL (ref 3.5–5.2)
ALP SERPL-CCNC: 84 U/L (ref 35–104)
ALT SERPL W P-5'-P-CCNC: 17 U/L (ref 0–50)
ANION GAP SERPL CALCULATED.3IONS-SCNC: 11 MMOL/L (ref 7–15)
AST SERPL W P-5'-P-CCNC: 32 U/L (ref 0–45)
BASOPHILS # BLD AUTO: 0 10E3/UL (ref 0–0.2)
BASOPHILS NFR BLD AUTO: 0 %
BILIRUB SERPL-MCNC: 0.5 MG/DL
BUN SERPL-MCNC: 14 MG/DL (ref 8–23)
CALCIUM SERPL-MCNC: 9.6 MG/DL (ref 8.8–10.2)
CHLORIDE SERPL-SCNC: 101 MMOL/L (ref 98–107)
CREAT SERPL-MCNC: 0.8 MG/DL (ref 0.51–0.95)
DEPRECATED HCO3 PLAS-SCNC: 25 MMOL/L (ref 22–29)
EGFRCR SERPLBLD CKD-EPI 2021: 74 ML/MIN/1.73M2
EOSINOPHIL # BLD AUTO: 0.1 10E3/UL (ref 0–0.7)
EOSINOPHIL NFR BLD AUTO: 1 %
ERYTHROCYTE [DISTWIDTH] IN BLOOD BY AUTOMATED COUNT: 13.2 % (ref 10–15)
GLUCOSE SERPL-MCNC: 107 MG/DL (ref 70–99)
HCT VFR BLD AUTO: 39.4 % (ref 35–47)
HGB BLD-MCNC: 13.4 G/DL (ref 11.7–15.7)
IMM GRANULOCYTES # BLD: 0.1 10E3/UL
IMM GRANULOCYTES NFR BLD: 1 %
LDH SERPL L TO P-CCNC: 196 U/L (ref 0–250)
LYMPHOCYTES # BLD AUTO: 1.4 10E3/UL (ref 0.8–5.3)
LYMPHOCYTES NFR BLD AUTO: 16 %
MCH RBC QN AUTO: 30.7 PG (ref 26.5–33)
MCHC RBC AUTO-ENTMCNC: 34 G/DL (ref 31.5–36.5)
MCV RBC AUTO: 90 FL (ref 78–100)
MONOCYTES # BLD AUTO: 0.6 10E3/UL (ref 0–1.3)
MONOCYTES NFR BLD AUTO: 7 %
NEUTROPHILS # BLD AUTO: 6.8 10E3/UL (ref 1.6–8.3)
NEUTROPHILS NFR BLD AUTO: 75 %
NRBC # BLD AUTO: 0 10E3/UL
NRBC BLD AUTO-RTO: 0 /100
PLATELET # BLD AUTO: 217 10E3/UL (ref 150–450)
POTASSIUM SERPL-SCNC: 4 MMOL/L (ref 3.4–5.3)
PROT SERPL-MCNC: 6.8 G/DL (ref 6.4–8.3)
RBC # BLD AUTO: 4.37 10E6/UL (ref 3.8–5.2)
SODIUM SERPL-SCNC: 137 MMOL/L (ref 136–145)
WBC # BLD AUTO: 9 10E3/UL (ref 4–11)

## 2023-09-21 PROCEDURE — 82784 ASSAY IGA/IGD/IGG/IGM EACH: CPT | Mod: ZL

## 2023-09-21 PROCEDURE — 85014 HEMATOCRIT: CPT | Mod: ZL

## 2023-09-21 PROCEDURE — 83615 LACTATE (LD) (LDH) ENZYME: CPT | Mod: ZL

## 2023-09-21 PROCEDURE — G0463 HOSPITAL OUTPT CLINIC VISIT: HCPCS

## 2023-09-21 PROCEDURE — 36415 COLL VENOUS BLD VENIPUNCTURE: CPT | Mod: ZL

## 2023-09-21 PROCEDURE — 99207 PR NO CHARGE LOS: CPT

## 2023-09-21 PROCEDURE — 96523 IRRIG DRUG DELIVERY DEVICE: CPT

## 2023-09-21 PROCEDURE — 250N000011 HC RX IP 250 OP 636: Mod: JZ | Performed by: INTERNAL MEDICINE

## 2023-09-21 PROCEDURE — 99213 OFFICE O/P EST LOW 20 MIN: CPT | Performed by: NURSE PRACTITIONER

## 2023-09-21 PROCEDURE — 80053 COMPREHEN METABOLIC PANEL: CPT | Mod: ZL

## 2023-09-21 RX ORDER — HEPARIN SODIUM (PORCINE) LOCK FLUSH IV SOLN 100 UNIT/ML 100 UNIT/ML
500 SOLUTION INTRAVENOUS EVERY 8 HOURS
Status: DISCONTINUED | OUTPATIENT
Start: 2023-09-21 | End: 2023-09-21 | Stop reason: HOSPADM

## 2023-09-21 RX ORDER — LIDOCAINE/PRILOCAINE 2.5 %-2.5%
CREAM (GRAM) TOPICAL PRN
Qty: 30 G | Refills: 1 | Status: SHIPPED | OUTPATIENT
Start: 2023-09-21 | End: 2024-09-09

## 2023-09-21 RX ORDER — HEPARIN SODIUM (PORCINE) LOCK FLUSH IV SOLN 100 UNIT/ML 100 UNIT/ML
500 SOLUTION INTRAVENOUS EVERY 8 HOURS
Status: CANCELLED
Start: 2023-09-21

## 2023-09-21 RX ADMIN — HEPARIN 500 UNITS: 100 SYRINGE at 09:04

## 2023-09-21 ASSESSMENT — PAIN SCALES - GENERAL: PAINLEVEL: NO PAIN (0)

## 2023-09-21 NOTE — PROGRESS NOTES
Oncology Follow-up Visit:  September 21, 2023    Reason for Visit:  Patient presents with:  Oncology Clinic Visit: Follow up Chronic lymphocytic leukemia        Nursing Note and documentation reviewed: yes    HPI:   This is a 82-year-old female patient who presents to the oncology clinic today in follow up of CLL diagnosed December 2011. She completed 6 cycles of Bendamustine July 2, 2015.   She is being followed with surveillance.     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    She presents to the clinic today accompanied by her granddaughter stating she is doing okay.  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  She states she is recovering from this.  She only had a slight rash at the time of diagnosis.  She continues to follow with Dr. Shearer at the [facility].  She denies any issues with fevers or frequent infections.  She denies any night sweats or weight loss.  She denies any new lumps.  She did have fairly extreme fatigue after diagnosed with the shingles.    Oncologic History:     Jennifer was diagnosed with CLL in December 2011 after her primary care provider noted an elevated WBC on her annual exam. A peripheral blood flow for cytometry revealed she was CD5 positive, CD10 negative, consistent with CLL/SLL. Staging studies were completed and CT scan showed no evidence of lymphadenopathy or splenomegaly. She was asymptomatic with essentially stable lymphocyte count and followed with surveillance.      With patient's follow up in October 2014, hemoglobin was noted to be 10.7 with Hematocrit 30.0 and platelets 137,000. She was subsequently seen again 2 months later with further drop in hemoglobin to 8.6 with hematocrit 26.1 and platelets 140,000 with a white blood cell count of 13.5 and ANC 1.2, absolute lymphocytes were 12.1. A PET scan was completed on 12/11/2014 which did show mildly enlarged axillary lymph nodes with very mild  abnormal hypermetabolism of SUV of 2 or less. Bone marrow aspiration and biopsy was completed which revealed infiltration of CLL with 90% of cells confirmed to be CLL via flow morphologically. Patient was staged at least stage III CLL based on her anemia. The plan was to proceed with chemotherapy consisting of Rituxan and Fludara. Patient did experience hypersensitivity reaction with cycle 2 Rituxan therapy experiencing hypotension. This was discontinued and chemotherapy regime was changed to Bendamustine day 1 and day 2 every 28 days. She was started on 2/12/2015 and completed therapy in July 2015.      Current Chemo Regime/TX:  n/a  Current Cycle:  n/a  # of completed cycles:  n/a      Previous treatment: Rituxan/Fludara completing one full cycle and experienced hypersensitivity with Rituxan with second cycle;  Bendamustine 50 mg per metered squared days 1 and 2 every 28 days x6 cycles completed 7/2/15     Past Medical History:   Diagnosis Date    Arthritis     back, fingers, legs    Cancer (H)     chronic lymphocytic leukemia    Chronic lymphoid leukemia, 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 2/28/2012    Diverticulosis of colon (without mention of hemorrhage) 11/29/2010    Family history of ischemic heart disease 11/29/2010    Family history of malignant neoplasm of gastrointestinal tract 10/14/2002    History of blood transfusion     prior to chemo    Non-toxic multinodular goiter 11/8/2016    Osteoporosis, unspecified 11/29/2010    Other and unspecified hyperlipidemia 11/28/2011    Viral warts, unspecified 11/28/2011       Past Surgical History:   Procedure Laterality Date    ------------OTHER-------------      elbow repair    COLONOSCOPY  01/12/2010    repeat in 2015    COLONOSCOPY      SEVERAL COLONOSCOPIES - ALL BEEN NORMAL - REFUSES ANY MORE     COLONOSCOPY      INSERT PORT VASCULAR ACCESS N/A 1/5/2015    Procedure: INSERT PORT VASCULAR ACCESS;  Surgeon: Linda Oliver MD;  Location:  [facility]    IR PORT CHECK LEFT  5/20/2020    ORTHOPEDIC SURGERY  1993    Left elbow repair       Family History   Problem Relation Age of Onset    Cancer Mother 97        colon cancer - cause of death    Colon Cancer Mother     Cancer Father     Colon Cancer Father     Diabetes Brother     Obesity Brother     Breast Cancer Daughter     Hyperlipidemia Daughter     Thyroid Disease Daughter     Hypertension Son     Hyperlipidemia Son     Thyroid Disease Cousin     Hyperlipidemia Son     Coronary Artery Disease No family hx of     Cerebrovascular Disease No family hx of     Prostate Cancer No family hx of     Other Cancer No family hx of     Anesthesia Reaction No family hx of     Asthma No family hx of     Genetic Disorder No family hx of        Social History     Socioeconomic History    Marital status:      Spouse name: Not on file    Number of children: Not on file    Years of education: Not on file    Highest education level: Not on file   Occupational History    Occupation: [employer]   Tobacco Use    Smoking status: Never    Smokeless tobacco: Never   Vaping Use    Vaping Use: Never used   Substance and Sexual Activity    Alcohol use: No    Drug use: No    Sexual activity: Never     Comment: devorced   Other Topics Concern    Parent/sibling w/ CABG, MI or angioplasty before 65F 55M? No     Service No    Blood Transfusions Yes     Comment: permits if needed    Caffeine Concern Yes     Comment: 1 cup    Occupational Exposure No    Hobby Hazards No    Sleep Concern No    Stress Concern No    Weight Concern No    Special Diet No    Back Care No    Exercise No    Bike Helmet Not Asked    Seat Belt Yes    Self-Exams Not Asked   Social History Narrative    Not on file     Social Determinants of Health     Financial Resource Strain: Not on file   Food Insecurity: Not on file   Transportation Needs: Not on file   Physical Activity: Not on file   Stress: Not on file   Social Connections: Not on file  "  Interpersonal Safety: Not on file   Housing Stability: Not on file       Current Outpatient Medications   Medication    Ascorbic Acid (VITAMIN C PO)    Cholecalciferol (VITAMIN D3 PO)    lidocaine-prilocaine (EMLA) cream    Multiple Vitamins-Minerals (CENTRUM SILVER 50+WOMEN PO)     No current facility-administered medications for this visit.     Facility-Administered Medications Ordered in Other Visits   Medication    heparin 100 unit/mL injection 500 Units    sodium chloride (PF) 0.9% PF flush 10 mL        Allergies   Allergen Reactions    Sulfa Antibiotics Rash    Rituxan [Rituximab]     Simvastatin Other (See Comments)     Takes pravastatin at home  Zocor - myalgia       Review Of Systems:  Constitutional:    denies fever, weight changes, chills, and night sweats.  Eyes:    denies blurred or double vision  Ears/Nose/Throat:   denies ear pain, difficulty swallowing; has runny nose  Respiratory:   denies shortness of breath, frequent cough   Skin:   denies rash, lesions  Cardiovascular:   denies chest pain, palpitations, edema  Gastrointestinal:   denies abdominal pain, bloating, nausea, early satiety; no change in bowel habits or blood in stool  Genitourinary:   denies difficulty with urination, blood in urine  Musculoskeletal:    denies new muscle pain, bone pain  Neurologic:   denies lightheadedness, occasional headaches, denies numbness or tingling  Psychiatric:   denies anxiety, depression  Hematologic/Lymphatic/Immunologic:   denies easy bruising, easy bleeding, lumps or bumps noted  Endocrine:   Denies increased thirst      Physical Exam:  BP (!) 140/78   Pulse 96   Temp 98.2  F (36.8  C) (Tympanic)   Resp 20   Ht 1.499 m (4' 11\")   Wt 45.2 kg (99 lb 10.4 oz)   SpO2 98%   BMI 20.13 kg/m      GENERAL APPEARANCE: alert and in no acute distress.  HEENT: Normocephalic, Sclerae anicteric.  No obvious oral lesions or thrush  NECK:   No asymmetry or masses, no thyromegaly.  LYMPHATICS: No palpable " cervical, supraclavicular, axillary, or inguinal nodes   RESP: Lungs clear to auscultation bilaterally, respirations regular and easy  CARDIOVASCULAR: Regular rate and rhythm. Normal S1, S2  ABDOMEN: Soft, nontender. Bowel sounds auscultated all 4 quadrants. No palpable organomegaly or masses.  MUSCULOSKELETAL: Extremities without gross deformities noted. No edema of bilateral lower extremities.  SKIN: No suspicious lesions or rashes to exposed skin  NEURO: Alert and oriented x 3.  Gait steady.  PSYCHIATRIC: Mentation and affect appear normal.  Mood appropriate.    Laboratory:  Results for orders placed or performed in visit on 09/21/23   Lactate Dehydrogenase     Status: Normal   Result Value Ref Range    Lactate Dehydrogenase 196 0 - 250 U/L   Comprehensive metabolic panel     Status: Abnormal   Result Value Ref Range    Sodium 137 136 - 145 mmol/L    Potassium 4.0 3.4 - 5.3 mmol/L    Chloride 101 98 - 107 mmol/L    Carbon Dioxide (CO2) 25 22 - 29 mmol/L    Anion Gap 11 7 - 15 mmol/L    Urea Nitrogen 14.0 8.0 - 23.0 mg/dL    Creatinine 0.80 0.51 - 0.95 mg/dL    Calcium 9.6 8.8 - 10.2 mg/dL    Glucose 107 (H) 70 - 99 mg/dL    Alkaline Phosphatase 84 35 - 104 U/L    AST 32 0 - 45 U/L    ALT 17 0 - 50 U/L    Protein Total 6.8 6.4 - 8.3 g/dL    Albumin 4.6 3.5 - 5.2 g/dL    Bilirubin Total 0.5 <=1.2 mg/dL    GFR Estimate 74 >60 mL/min/1.73m2   CBC with platelets and differential     Status: None   Result Value Ref Range    WBC Count 9.0 4.0 - 11.0 10e3/uL    RBC Count 4.37 3.80 - 5.20 10e6/uL    Hemoglobin 13.4 11.7 - 15.7 g/dL    Hematocrit 39.4 35.0 - 47.0 %    MCV 90 78 - 100 fL    MCH 30.7 26.5 - 33.0 pg    MCHC 34.0 31.5 - 36.5 g/dL    RDW 13.2 10.0 - 15.0 %    Platelet Count 217 150 - 450 10e3/uL    % Neutrophils 75 %    % Lymphocytes 16 %    % Monocytes 7 %    % Eosinophils 1 %    % Basophils 0 %    % Immature Granulocytes 1 %    NRBCs per 100 WBC 0 <1 /100    Absolute Neutrophils 6.8 1.6 - 8.3 10e3/uL     Absolute Lymphocytes 1.4 0.8 - 5.3 10e3/uL    Absolute Monocytes 0.6 0.0 - 1.3 10e3/uL    Absolute Eosinophils 0.1 0.0 - 0.7 10e3/uL    Absolute Basophils 0.0 0.0 - 0.2 10e3/uL    Absolute Immature Granulocytes 0.1 <=0.4 10e3/uL    Absolute NRBCs 0.0 10e3/uL   CBC with Platelets & Differential     Status: None    Narrative    The following orders were created for panel order CBC with Platelets & Differential.  Procedure                               Abnormality         Status                     ---------                               -----------         ------                     CBC with platelets and d...[[id number]]                      Final result                 Please view results for these tests on the individual orders.        Imaging Studies:  none completed for today's visit      ASSESSMENT/PLAN:    #1 CLL/SLL: Diagnosed with CLL/SLL in December 2011. Stage III. She received 6 cycles of bendamustine completing this July 2015 and is now followed with surveillance.  She is feeling good.  No concerns on lab work.  Quantitative immunoglobulins pending.  She will follow up in 6 months with a CBC, CMP and LDH.  We will then try to alternate visits every 6 months between myself and her PCP so I will see her on an annual basis.     #2 shingles: Quantitative immunoglobulins are pending.    #3  Port-A-Cath in place: Emla cream was sent to her pharmacy.  She prefers to keep this in place at this time.    I encouraged patient to call with any questions or concerns.    Yaneli Gray, NP  APRN, FNP-BC, AOCNP

## 2023-09-21 NOTE — NURSING NOTE
"Oncology Rooming Note    September 21, 2023 8:45 AM   Jennifer Barajas is a 82 year old female who presents for:    Chief Complaint   Patient presents with    Oncology Clinic Visit     Follow up Chronic lymphocytic leukemia        Initial Vitals: BP (!) 140/78   Pulse 96   Temp 98.2  F (36.8  C) (Tympanic)   Resp 20   Ht 1.499 m (4' 11\")   Wt 45.2 kg (99 lb 10.4 oz)   SpO2 98%   BMI 20.13 kg/m   Estimated body mass index is 20.13 kg/m  as calculated from the following:    Height as of this encounter: 1.499 m (4' 11\").    Weight as of this encounter: 45.2 kg (99 lb 10.4 oz). Body surface area is 1.37 meters squared.  No Pain (0) Comment: Data Unavailable   No LMP recorded. Patient is postmenopausal.  Allergies reviewed: Yes  Medications reviewed: Yes    Medications: Medication refills not needed today.  Pharmacy name entered into EPIC:    [id number] - [place], [place] - [address] [id number] - [place], [place] - [address] AT [facility] [place] [address]  [address]          Ana Tate LPN             "

## 2023-09-21 NOTE — PATIENT INSTRUCTIONS
We will schedule you for port flushes every 6 weeks.    We would like to see you back in March. Please come 30 minutes prior for lab work through your port.     We will plan to have you see Dr. De La Rosa then in November 2024 for your annual exam and we will alternate your visits every 6 months.  You will need to call and make that appointment.    Your prescription for Emla has been sent to: [facility].      When you are in need of a refill, please call your pharmacy and they will send us a request.     If you have any questions please call [phone]    Other instructions:  none

## 2023-09-22 LAB
IGA SERPL-MCNC: 119 MG/DL (ref 84–499)
IGG SERPL-MCNC: 792 MG/DL (ref 610–1616)
IGM SERPL-MCNC: 58 MG/DL (ref 35–242)

## 2023-10-19 ENCOUNTER — TELEPHONE (OUTPATIENT)
Dept: FAMILY MEDICINE | Facility: OTHER | Age: 83
End: 2023-10-19

## 2023-10-19 ENCOUNTER — OFFICE VISIT (OUTPATIENT)
Dept: FAMILY MEDICINE | Facility: OTHER | Age: 83
End: 2023-10-19
Attending: FAMILY MEDICINE
Payer: COMMERCIAL

## 2023-10-19 VITALS
WEIGHT: 98.38 LBS | HEART RATE: 98 BPM | OXYGEN SATURATION: 98 % | TEMPERATURE: 98.2 F | BODY MASS INDEX: 19.87 KG/M2 | SYSTOLIC BLOOD PRESSURE: 141 MMHG | DIASTOLIC BLOOD PRESSURE: 80 MMHG

## 2023-10-19 DIAGNOSIS — J01.20 ACUTE NON-RECURRENT ETHMOIDAL SINUSITIS: Primary | ICD-10-CM

## 2023-10-19 PROCEDURE — G0463 HOSPITAL OUTPT CLINIC VISIT: HCPCS

## 2023-10-19 PROCEDURE — 99213 OFFICE O/P EST LOW 20 MIN: CPT | Performed by: FAMILY MEDICINE

## 2023-10-19 RX ORDER — BENZONATATE 100 MG/1
100 CAPSULE ORAL 3 TIMES DAILY PRN
Qty: 21 CAPSULE | Refills: 0 | Status: SHIPPED | OUTPATIENT
Start: 2023-10-19 | End: 2024-03-07

## 2023-10-19 RX ORDER — AMOXICILLIN AND CLAVULANATE POTASSIUM 500; 125 MG/1; MG/1
1 TABLET, FILM COATED ORAL 2 TIMES DAILY
Qty: 14 TABLET | Refills: 0 | Status: SHIPPED | OUTPATIENT
Start: 2023-10-19 | End: 2024-03-07

## 2023-10-19 ASSESSMENT — PAIN SCALES - GENERAL: PAINLEVEL: NO PAIN (0)

## 2023-10-19 NOTE — TELEPHONE ENCOUNTER
8:10 AM    Reason for Call: OVERBOOK    Patient is having the following symptoms: Patient needs to be seen for cough/a lot of thick yellow and brown mucus,sore throat, ears , patient has been like this for 10 days. days.    The patient is requesting an appointment for Overbook with .    Was an appointment offered for this call? No  If yes : Appointment type              Date  11/23    Preferred method for responding to this message: Telephone Call  What is your phone number ?   [phone]    If we cannot reach you directly, may we leave a detailed response at the number you provided? Yes    Can this message wait until your PCP/provider returns, if unavailable today? Provider is in today    Zo Decker

## 2023-10-19 NOTE — PROGRESS NOTES
Assessment & Plan     Acute non-recurrent ethmoidal sinusitis  Will treat. Symptomatic treatment was discussed along when patient should call and/or come back into the clinic or go to ER/Urgent care. All questions answered.   Symptomatic treatment was discussed along what is available for OTC medications for symptomatic relief.   - amoxicillin-clavulanate (AUGMENTIN) 500-125 MG tablet; Take 1 tablet by mouth 2 times daily  - benzonatate (TESSALON) 100 MG capsule; Take 1 capsule (100 mg) by mouth 3 times daily as needed for cough                 No follow-ups on file.    Zheng De La Rosa MD  [facility] [place] Rodirguez is a 83 year old, presenting for the following health issues:  Cough        10/19/2023     9:00 AM   Additional Questions   Roomed by Susan John   Accompanied by None         10/19/2023     9:00 AM   Patient Reported Additional Medications   Patient reports taking the following new medications None       HPI     Acute Illness  Acute illness concerns: Cough, sore throat, runny nose   Onset/Duration: about 10 days ago-- not improving much   Symptoms:  Fever: YES  Chills/Sweats: No  Headache (location?): No  Sinus Pressure: No sinus pressure but some pain in her teeth/jaw   Conjunctivitis:  No  Ear Pain: YES: both  Rhinorrhea: YES  Congestion: YES  Sore Throat: YES  Cough: YES-productive of yellow / brown sputum, with shortness of breath  Wheeze: No  Decreased Appetite: No  Nausea: No  Vomiting: No  Diarrhea: No  Dysuria/Freq.: No  Dysuria or Hematuria: No  Fatigue/Achiness: YES- some aches   Sick/Strep Exposure: No  Therapies tried and outcome: Vitamin C, cough drops \    Increase sinus congestion and pnd  Lots of coughing         Review of Systems   Constitutional, HEENT, cardiovascular, pulmonary, gi and gu systems are negative, except as otherwise noted.      Objective    BP (!) 141/80 (BP Location: Right arm, Patient Position: Sitting, Cuff Size: Adult Regular)    Pulse 98   Temp 98.2  F (36.8  C) (Tympanic)   Wt 44.6 kg (98 lb 6 oz)   SpO2 98%   BMI 19.87 kg/m    Body mass index is 19.87 kg/m .  Physical Exam   GENERAL: healthy, alert and no distress  HENT: ear canals and TM's normal, nose and mouth without ulcers or lesions- mild sinus tenderness   NECK: no adenopathy, no asymmetry, masses, or scars and thyroid normal to palpation  RESP: lungs clear to auscultation - no rales, rhonchi or wheezes  MS: no gross musculoskeletal defects noted, no edema

## 2023-11-02 ENCOUNTER — INFUSION THERAPY VISIT (OUTPATIENT)
Dept: INFUSION THERAPY | Facility: OTHER | Age: 83
End: 2023-11-02
Attending: INTERNAL MEDICINE
Payer: COMMERCIAL

## 2023-11-02 DIAGNOSIS — C91.11 CHRONIC LYMPHOCYTIC LEUKEMIA IN REMISSION (H): Primary | ICD-10-CM

## 2023-11-02 PROCEDURE — 250N000011 HC RX IP 250 OP 636: Mod: JZ | Performed by: INTERNAL MEDICINE

## 2023-11-02 PROCEDURE — 96523 IRRIG DRUG DELIVERY DEVICE: CPT

## 2023-11-02 RX ORDER — HEPARIN SODIUM (PORCINE) LOCK FLUSH IV SOLN 100 UNIT/ML 100 UNIT/ML
500 SOLUTION INTRAVENOUS EVERY 8 HOURS
Status: CANCELLED
Start: 2023-11-02

## 2023-11-02 RX ORDER — HEPARIN SODIUM (PORCINE) LOCK FLUSH IV SOLN 100 UNIT/ML 100 UNIT/ML
500 SOLUTION INTRAVENOUS EVERY 8 HOURS
Status: DISCONTINUED | OUTPATIENT
Start: 2023-11-02 | End: 2023-11-02 | Stop reason: HOSPADM

## 2023-11-02 RX ADMIN — HEPARIN 500 UNITS: 100 SYRINGE at 08:31

## 2023-12-14 ENCOUNTER — INFUSION THERAPY VISIT (OUTPATIENT)
Dept: INFUSION THERAPY | Facility: OTHER | Age: 83
End: 2023-12-14
Attending: INTERNAL MEDICINE
Payer: COMMERCIAL

## 2023-12-14 DIAGNOSIS — C91.11 CHRONIC LYMPHOCYTIC LEUKEMIA IN REMISSION (H): Primary | ICD-10-CM

## 2023-12-14 PROCEDURE — 250N000011 HC RX IP 250 OP 636: Mod: JZ | Performed by: INTERNAL MEDICINE

## 2023-12-14 PROCEDURE — 96523 IRRIG DRUG DELIVERY DEVICE: CPT

## 2023-12-14 RX ORDER — HEPARIN SODIUM (PORCINE) LOCK FLUSH IV SOLN 100 UNIT/ML 100 UNIT/ML
500 SOLUTION INTRAVENOUS EVERY 8 HOURS
Status: DISCONTINUED | OUTPATIENT
Start: 2023-12-14 | End: 2023-12-14 | Stop reason: HOSPADM

## 2023-12-14 RX ORDER — HEPARIN SODIUM (PORCINE) LOCK FLUSH IV SOLN 100 UNIT/ML 100 UNIT/ML
500 SOLUTION INTRAVENOUS EVERY 8 HOURS
Status: CANCELLED
Start: 2023-12-14

## 2023-12-14 RX ADMIN — HEPARIN 500 UNITS: 100 SYRINGE at 08:34

## 2024-01-25 ENCOUNTER — INFUSION THERAPY VISIT (OUTPATIENT)
Dept: INFUSION THERAPY | Facility: OTHER | Age: 84
End: 2024-01-25
Attending: INTERNAL MEDICINE
Payer: COMMERCIAL

## 2024-01-25 DIAGNOSIS — C91.11 CHRONIC LYMPHOCYTIC LEUKEMIA IN REMISSION (H): Primary | ICD-10-CM

## 2024-01-25 PROCEDURE — 250N000011 HC RX IP 250 OP 636: Performed by: INTERNAL MEDICINE

## 2024-01-25 PROCEDURE — 96523 IRRIG DRUG DELIVERY DEVICE: CPT

## 2024-01-25 RX ORDER — HEPARIN SODIUM (PORCINE) LOCK FLUSH IV SOLN 100 UNIT/ML 100 UNIT/ML
500 SOLUTION INTRAVENOUS EVERY 8 HOURS
Status: CANCELLED
Start: 2024-01-25

## 2024-01-25 RX ORDER — HEPARIN SODIUM (PORCINE) LOCK FLUSH IV SOLN 100 UNIT/ML 100 UNIT/ML
500 SOLUTION INTRAVENOUS EVERY 8 HOURS
Status: DISCONTINUED | OUTPATIENT
Start: 2024-01-25 | End: 2024-01-25 | Stop reason: HOSPADM

## 2024-01-25 RX ADMIN — HEPARIN 500 UNITS: 100 SYRINGE at 08:20

## 2024-01-25 NOTE — PROGRESS NOTES
Port accessed and flushed per orders.    Patient asked if appts in March could be moved up by 2 weeks as she doesn't want to go more than 6 weeks without a port flush/access, and would like to keep appts same day if possible. Note routed to ONC scheduling pool asking if this change can be made and patient called with update.

## 2024-03-06 NOTE — PROGRESS NOTES
Oncology Follow-up Visit:  March 6, 2024    Reason for Visit:  Patient presents with:  Oncology Clinic Visit: Follow up Chronic lymphocytic leukemia in remission       Nursing Note and documentation reviewed: yes    HPI:   This is a 82-year-old female patient who presents to the oncology clinic today in follow up of CLL diagnosed December 2011. She completed 6 cycles of Bendamustine July 2, 2015.   She is being followed with surveillance.     She presents to the clinic today accompanied by her daughter stating she is doing well.  She denies any issues with fevers, night sweats, weight loss and states her appetite has been good.  She does have ongoing issues with anxiety and this is essentially unchanged.  Note she has recovered from having shingles to the left eye a year ago.  She did have a cold a couple months ago that turned into a sinus infection and was placed on antibiotics.    Oncologic History:     Jennifer was diagnosed with CLL in December 2011 after her primary care provider noted an elevated WBC on her annual exam. A peripheral blood flow for cytometry revealed she was CD5 positive, CD10 negative, consistent with CLL/SLL. Staging studies were completed and CT scan showed no evidence of lymphadenopathy or splenomegaly. She was asymptomatic with essentially stable lymphocyte count and followed with surveillance.      With patient's follow up in October 2014, hemoglobin was noted to be 10.7 with Hematocrit 30.0 and platelets 137,000. She was subsequently seen again 2 months later with further drop in hemoglobin to 8.6 with hematocrit 26.1 and platelets 140,000 with a white blood cell count of 13.5 and ANC 1.2, absolute lymphocytes were 12.1. A PET scan was completed on 12/11/2014 which did show mildly enlarged axillary lymph nodes with very mild abnormal hypermetabolism of SUV of 2 or less. Bone marrow aspiration and biopsy was completed which revealed infiltration of CLL with 90% of cells confirmed to be CLL  via flow morphologically. Patient was staged at least stage III CLL based on her anemia. The plan was to proceed with chemotherapy consisting of Rituxan and Fludara. Patient did experience hypersensitivity reaction with cycle 2 Rituxan therapy experiencing hypotension. This was discontinued and chemotherapy regime was changed to Bendamustine day 1 and day 2 every 28 days. She was started on 2/12/2015 and completed therapy in July 2015.      Current Chemo Regime/TX:  n/a  Current Cycle:  n/a  # of completed cycles:  n/a      Previous treatment: Rituxan/Fludara completing one full cycle and experienced hypersensitivity with Rituxan with second cycle;  Bendamustine 50 mg per metered squared days 1 and 2 every 28 days x6 cycles completed 7/2/15     Past Medical History:   Diagnosis Date    Arthritis     back, fingers, legs    Cancer ([facility])     chronic lymphocytic leukemia    Chronic lymphoid leukemia, 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 2/28/2012    Diverticulosis of colon (without mention of hemorrhage) 11/29/2010    Family history of ischemic heart disease 11/29/2010    Family history of malignant neoplasm of gastrointestinal tract 10/14/2002    History of blood transfusion     prior to chemo    Non-toxic multinodular goiter 11/8/2016    Osteoporosis, unspecified 11/29/2010    Other and unspecified hyperlipidemia 11/28/2011    Viral warts, unspecified 11/28/2011       Past Surgical History:   Procedure Laterality Date    ------------OTHER-------------      elbow repair    COLONOSCOPY  01/12/2010    repeat in 2015    COLONOSCOPY      SEVERAL COLONOSCOPIES - ALL BEEN NORMAL - REFUSES ANY MORE     COLONOSCOPY      INSERT PORT VASCULAR ACCESS N/A 1/5/2015    Procedure: INSERT PORT VASCULAR ACCESS;  Surgeon: Linda Oliver MD;  Location: [facility]    IR PORT CHECK LEFT  5/20/2020    ORTHOPEDIC SURGERY  1993    Left elbow repair       Family History   Problem Relation Age of Onset    Cancer Mother 97         colon cancer - cause of death    Colon Cancer Mother     Cancer Father     Colon Cancer Father     Diabetes Brother     Obesity Brother     Breast Cancer Daughter     Hyperlipidemia Daughter     Thyroid Disease Daughter     Hypertension Son     Hyperlipidemia Son     Thyroid Disease Cousin     Hyperlipidemia Son     Coronary Artery Disease No family hx of     Cerebrovascular Disease No family hx of     Prostate Cancer No family hx of     Other Cancer No family hx of     Anesthesia Reaction No family hx of     Asthma No family hx of     Genetic Disorder No family hx of        Social History     Socioeconomic History    Marital status:      Spouse name: Not on file    Number of children: Not on file    Years of education: Not on file    Highest education level: Not on file   Occupational History    Occupation: [employer]   Tobacco Use    Smoking status: Never    Smokeless tobacco: Never   Vaping Use    Vaping Use: Never used   Substance and Sexual Activity    Alcohol use: No    Drug use: No    Sexual activity: Never     Comment: devorced   Other Topics Concern    Parent/sibling w/ CABG, MI or angioplasty before 65F 55M? No     Service No    Blood Transfusions Yes     Comment: permits if needed    Caffeine Concern Yes     Comment: 1 cup    Occupational Exposure No    Hobby Hazards No    Sleep Concern No    Stress Concern No    Weight Concern No    Special Diet No    Back Care No    Exercise No    Bike Helmet Not Asked    Seat Belt Yes    Self-Exams Not Asked   Social History Narrative    Not on file     Social Determinants of Health     Financial Resource Strain: Low Risk  (10/19/2023)    Financial Resource Strain     Within the past 12 months, have you or your family members you live with been unable to get utilities (heat, electricity) when it was really needed?: No   Food Insecurity: Low Risk  (10/19/2023)    Food Insecurity     Within the past 12 months, did you worry that your food would run out  before you got money to buy more?: No     Within the past 12 months, did the food you bought just not last and you didn t have money to get more?: No   Transportation Needs: Low Risk  (10/19/2023)    Transportation Needs     Within the past 12 months, has lack of transportation kept you from medical appointments, getting your medicines, non-medical meetings or appointments, work, or from getting things that you need?: No   Physical Activity: Not on file   Stress: Not on file   Social Connections: Not on file   Interpersonal Safety: Low Risk  (10/19/2023)    Interpersonal Safety     Do you feel physically and emotionally safe where you currently live?: Yes     Within the past 12 months, have you been hit, slapped, kicked or otherwise physically hurt by someone?: No     Within the past 12 months, have you been humiliated or emotionally abused in other ways by your partner or ex-partner?: No   Housing Stability: Low Risk  (10/19/2023)    Housing Stability     Do you have housing? : Yes     Are you worried about losing your housing?: No       Current Outpatient Medications   Medication    Ascorbic Acid (VITAMIN C PO)    Cholecalciferol (VITAMIN D3 PO)    lidocaine-prilocaine (EMLA) 2.5-2.5 % external cream    Multiple Vitamins-Minerals (CENTRUM SILVER 50+WOMEN PO)     No current facility-administered medications for this visit.     Facility-Administered Medications Ordered in Other Visits   Medication    heparin 100 unit/mL injection 500 Units    sodium chloride (PF) 0.9% PF flush 10 mL        Allergies   Allergen Reactions    Sulfa Antibiotics Rash    Rituxan [Rituximab]     Simvastatin Other (See Comments)     Takes pravastatin at home  Zocor - myalgia       Review Of Systems:  Constitutional:    denies fever, weight changes, chills, and night sweats.  Eyes:    denies blurred or double vision  Ears/Nose/Throat:   denies ear pain, nose problems, difficulty swallowing  Respiratory:   denies shortness of breath, cough  "  Skin:   scab to scalp month of February  Cardiovascular:   denies chest pain, palpitations, edema  Gastrointestinal:   denies abdominal pain, bloating, nausea, early satiety; no change in bowel habits or blood in stool  Genitourinary:   denies difficulty with urination, blood in urine  Musculoskeletal:    denies new muscle pain, bone pain  Neurologic:   denies lightheadedness, headaches, numbness or tingling  Psychiatric:   denies depression; see hPI  Hematologic/Lymphatic/Immunologic:   denies easy bruising, easy bleeding, lumps or bumps noted  Endocrine:   Denies increased thirst      Physical Exam:  /70   Pulse 78   Temp 97.2  F (36.2  C) (Tympanic)   Resp 20   Ht 1.499 m (4' 11\")   Wt 44.6 kg (98 lb 5.2 oz)   SpO2 100%   BMI 19.86 kg/m      GENERAL APPEARANCE: Healthy, alert and in no acute distress.  HEENT: Normocephalic, Sclerae anicteric.  No obvious oral lesions or thrush  NECK:   No asymmetry or masses, no thyromegaly.  LYMPHATICS: No palpable cervical, supraclavicular, axillary, or inguinal nodes   RESP: Lungs clear to auscultation bilaterally, respirations regular and easy  CARDIOVASCULAR: Regular rate and rhythm. Normal S1, S2; no murmur, gallop, or rub.  ABDOMEN: Soft, nontender. Bowel sounds auscultated all 4 quadrants. No palpable organomegaly or masses.  MUSCULOSKELETAL: Extremities without gross deformities noted. No edema of bilateral lower extremities.  SKIN: No suspicious lesions or rashes to exposed skin  NEURO: Alert and oriented x 3.  Gait steady.  PSYCHIATRIC: Mentation and affect appear normal.  Mood appropriate.    Laboratory:  Results for orders placed or performed in visit on 03/07/24   Lactate Dehydrogenase     Status: Normal   Result Value Ref Range    Lactate Dehydrogenase 197 0 - 250 U/L   Comprehensive metabolic panel     Status: Normal   Result Value Ref Range    Sodium 138 135 - 145 mmol/L    Potassium 3.9 3.4 - 5.3 mmol/L    Carbon Dioxide (CO2) 26 22 - 29 mmol/L    " Anion Gap 10 7 - 15 mmol/L    Urea Nitrogen 14.9 8.0 - 23.0 mg/dL    Creatinine 0.77 0.51 - 0.95 mg/dL    GFR Estimate 77 >60 mL/min/1.73m2    Calcium 9.5 8.8 - 10.2 mg/dL    Chloride 102 98 - 107 mmol/L    Glucose 94 70 - 99 mg/dL    Alkaline Phosphatase 90 40 - 150 U/L    AST 31 0 - 45 U/L    ALT 19 0 - 50 U/L    Protein Total 7.0 6.4 - 8.3 g/dL    Albumin 4.2 3.5 - 5.2 g/dL    Bilirubin Total 0.5 <=1.2 mg/dL   CBC with platelets and differential     Status: None   Result Value Ref Range    WBC Count 8.9 4.0 - 11.0 10e3/uL    RBC Count 4.48 3.80 - 5.20 10e6/uL    Hemoglobin 13.1 11.7 - 15.7 g/dL    Hematocrit 39.7 35.0 - 47.0 %    MCV 89 78 - 100 fL    MCH 29.2 26.5 - 33.0 pg    MCHC 33.0 31.5 - 36.5 g/dL    RDW 13.6 10.0 - 15.0 %    Platelet Count 227 150 - 450 10e3/uL    % Neutrophils 71 %    % Lymphocytes 19 %    % Monocytes 8 %    % Eosinophils 2 %    % Basophils 1 %    % Immature Granulocytes 0 %    NRBCs per 100 WBC 0 <1 /100    Absolute Neutrophils 6.3 1.6 - 8.3 10e3/uL    Absolute Lymphocytes 1.7 0.0 - 5.3 10e3/uL    Absolute Monocytes 0.7 0.0 - 1.3 10e3/uL    Absolute Eosinophils 0.2 0.0 - 0.7 10e3/uL    Absolute Basophils 0.0 0.0 - 0.2 10e3/uL    Absolute Immature Granulocytes 0.0 <=0.4 10e3/uL    Absolute NRBCs 0.0 10e3/uL   CBC with Platelets & Differential     Status: None    Narrative    The following orders were created for panel order CBC with Platelets & Differential.  Procedure                               Abnormality         Status                     ---------                               -----------         ------                     CBC with platelets and d...[[id number]]                      Final result                 Please view results for these tests on the individual orders.       Imaging Studies: None completed for today's visit    ASSESSMENT/PLAN:    #1 CLL/SLL: Diagnosed with CLL/SLL in December 2011. Stage III. She received 6 cycles of bendamustine completing this July 2015 and is  now followed with surveillance.  No concerns on lab work or exam.  We will now alternate visits every 6 months between myself and her PCP so I will see her on an annual basis.  I will see her back in 1 year with a CBC, CMP and LDH.    #2 Port-A-Cath in place: Discussed Port-A-Cath removal.  She would like to discuss further with the surgeon.    I encouraged patient to call with any questions or concerns.      Yaneli Gray, NP  APRN, FNP-BC, AOCNP

## 2024-03-07 ENCOUNTER — ONCOLOGY VISIT (OUTPATIENT)
Dept: ONCOLOGY | Facility: OTHER | Age: 84
End: 2024-03-07
Attending: NURSE PRACTITIONER
Payer: COMMERCIAL

## 2024-03-07 VITALS
OXYGEN SATURATION: 100 % | HEIGHT: 59 IN | BODY MASS INDEX: 19.82 KG/M2 | RESPIRATION RATE: 20 BRPM | TEMPERATURE: 97.2 F | HEART RATE: 78 BPM | SYSTOLIC BLOOD PRESSURE: 110 MMHG | DIASTOLIC BLOOD PRESSURE: 70 MMHG | WEIGHT: 98.33 LBS

## 2024-03-07 DIAGNOSIS — Z95.828 PORT-A-CATH IN PLACE: ICD-10-CM

## 2024-03-07 DIAGNOSIS — C91.11 CHRONIC LYMPHOCYTIC LEUKEMIA IN REMISSION (H): Primary | ICD-10-CM

## 2024-03-07 LAB
ALBUMIN SERPL BCG-MCNC: 4.2 G/DL (ref 3.5–5.2)
ALP SERPL-CCNC: 90 U/L (ref 40–150)
ALT SERPL W P-5'-P-CCNC: 19 U/L (ref 0–50)
ANION GAP SERPL CALCULATED.3IONS-SCNC: 10 MMOL/L (ref 7–15)
AST SERPL W P-5'-P-CCNC: 31 U/L (ref 0–45)
BASOPHILS # BLD AUTO: 0 10E3/UL (ref 0–0.2)
BASOPHILS NFR BLD AUTO: 1 %
BILIRUB SERPL-MCNC: 0.5 MG/DL
BUN SERPL-MCNC: 14.9 MG/DL (ref 8–23)
CALCIUM SERPL-MCNC: 9.5 MG/DL (ref 8.8–10.2)
CHLORIDE SERPL-SCNC: 102 MMOL/L (ref 98–107)
CREAT SERPL-MCNC: 0.77 MG/DL (ref 0.51–0.95)
DEPRECATED HCO3 PLAS-SCNC: 26 MMOL/L (ref 22–29)
EGFRCR SERPLBLD CKD-EPI 2021: 77 ML/MIN/1.73M2
EOSINOPHIL # BLD AUTO: 0.2 10E3/UL (ref 0–0.7)
EOSINOPHIL NFR BLD AUTO: 2 %
ERYTHROCYTE [DISTWIDTH] IN BLOOD BY AUTOMATED COUNT: 13.6 % (ref 10–15)
GLUCOSE SERPL-MCNC: 94 MG/DL (ref 70–99)
HCT VFR BLD AUTO: 39.7 % (ref 35–47)
HGB BLD-MCNC: 13.1 G/DL (ref 11.7–15.7)
IMM GRANULOCYTES # BLD: 0 10E3/UL
IMM GRANULOCYTES NFR BLD: 0 %
LDH SERPL L TO P-CCNC: 197 U/L (ref 0–250)
LYMPHOCYTES # BLD AUTO: 1.7 10E3/UL (ref 0–5.3)
LYMPHOCYTES NFR BLD AUTO: 19 %
MCH RBC QN AUTO: 29.2 PG (ref 26.5–33)
MCHC RBC AUTO-ENTMCNC: 33 G/DL (ref 31.5–36.5)
MCV RBC AUTO: 89 FL (ref 78–100)
MONOCYTES # BLD AUTO: 0.7 10E3/UL (ref 0–1.3)
MONOCYTES NFR BLD AUTO: 8 %
NEUTROPHILS # BLD AUTO: 6.3 10E3/UL (ref 1.6–8.3)
NEUTROPHILS NFR BLD AUTO: 71 %
NRBC # BLD AUTO: 0 10E3/UL
NRBC BLD AUTO-RTO: 0 /100
PLATELET # BLD AUTO: 227 10E3/UL (ref 150–450)
POTASSIUM SERPL-SCNC: 3.9 MMOL/L (ref 3.4–5.3)
PROT SERPL-MCNC: 7 G/DL (ref 6.4–8.3)
RBC # BLD AUTO: 4.48 10E6/UL (ref 3.8–5.2)
SODIUM SERPL-SCNC: 138 MMOL/L (ref 135–145)
WBC # BLD AUTO: 8.9 10E3/UL (ref 4–11)

## 2024-03-07 PROCEDURE — 99213 OFFICE O/P EST LOW 20 MIN: CPT | Performed by: NURSE PRACTITIONER

## 2024-03-07 PROCEDURE — 250N000011 HC RX IP 250 OP 636: Performed by: INTERNAL MEDICINE

## 2024-03-07 PROCEDURE — 96523 IRRIG DRUG DELIVERY DEVICE: CPT

## 2024-03-07 PROCEDURE — G0463 HOSPITAL OUTPT CLINIC VISIT: HCPCS

## 2024-03-07 PROCEDURE — 83615 LACTATE (LD) (LDH) ENZYME: CPT | Mod: ZL

## 2024-03-07 PROCEDURE — 85025 COMPLETE CBC W/AUTO DIFF WBC: CPT | Mod: ZL

## 2024-03-07 PROCEDURE — 80053 COMPREHEN METABOLIC PANEL: CPT | Mod: ZL

## 2024-03-07 PROCEDURE — 36415 COLL VENOUS BLD VENIPUNCTURE: CPT | Mod: ZL

## 2024-03-07 RX ORDER — HEPARIN SODIUM (PORCINE) LOCK FLUSH IV SOLN 100 UNIT/ML 100 UNIT/ML
500 SOLUTION INTRAVENOUS EVERY 8 HOURS
Status: DISCONTINUED | OUTPATIENT
Start: 2024-03-07 | End: 2024-03-07 | Stop reason: HOSPADM

## 2024-03-07 RX ORDER — HEPARIN SODIUM (PORCINE) LOCK FLUSH IV SOLN 100 UNIT/ML 100 UNIT/ML
500 SOLUTION INTRAVENOUS EVERY 8 HOURS
Start: 2024-03-07

## 2024-03-07 RX ADMIN — HEPARIN 500 UNITS: 100 SYRINGE at 08:00

## 2024-03-07 ASSESSMENT — PAIN SCALES - GENERAL: PAINLEVEL: NO PAIN (0)

## 2024-03-07 NOTE — PROGRESS NOTES
Patients left sided port accessed using non-coring, 19 gauge, 19 needle. Port accessed per facility protocol.  Hand hygiene performed: yes   Mask donned by caregiver: yes Site prepped with CHG: yes Labs drawn: yes Dressing applied using aseptic technique: yes Port flushed easily, without resistance. Flushed with 10 cc's normal saline.   Immediate blood return noted. 10 cc blood discarded.  2 vials blood draw and sent to lab for results.  Port flushed with 20 cc 0.9% normal saline and 5 cc heparinized saline.  Needle removed intact, sterile dressing applied.  Slight pressure applied for 30 seconds.   Patient tolerated port flush well, denies pain nor discomfort at this time. Patient instructed to leave dressing intact for a minimum of one hour, and to call with questions or concerns.  Patient states understanding and is in agreement with this plan. Patient discharged ambulatory. Shannan Barton RN

## 2024-03-07 NOTE — NURSING NOTE
"Oncology Rooming Note    March 7, 2024 8:14 AM   Jennifer Barajas is a 83 year old female who presents for:    Chief Complaint   Patient presents with    Oncology Clinic Visit     Follow up Chronic lymphocytic leukemia in remission       Initial Vitals: /70   Pulse 78   Temp 97.2  F (36.2  C) (Tympanic)   Resp 20   Ht 1.499 m (4' 11\")   Wt 44.6 kg (98 lb 5.2 oz)   SpO2 100%   BMI 19.86 kg/m   Estimated body mass index is 19.86 kg/m  as calculated from the following:    Height as of this encounter: 1.499 m (4' 11\").    Weight as of this encounter: 44.6 kg (98 lb 5.2 oz). Body surface area is 1.36 meters squared.  No Pain (0) Comment: Data Unavailable   No LMP recorded. Patient is postmenopausal.  Allergies reviewed: Yes  Medications reviewed: Yes    Medications: Medication refills not needed today.  Pharmacy name entered into EPIC:    [id number] - [place], [place] - [address] [id number] - [place], [place] - [address] AT [facility] OF  [address]    Frailty Screening:   Is the patient here for a new oncology consult visit in cancer care? 2. No            Ana Tate LPN             "

## 2024-03-07 NOTE — PATIENT INSTRUCTIONS
Referral was sent to see Dr. Barbour to discuss port removal.    Please see Dr. De La Rosa in 6 months.    We would like to see you back in 1 year. Please come 30 minutes prior for lab work through your port.    If you have any questions please call [phone]    Other instructions:  none

## 2024-03-13 ENCOUNTER — OFFICE VISIT (OUTPATIENT)
Dept: SURGERY | Facility: OTHER | Age: 84
End: 2024-03-13
Attending: SURGERY
Payer: COMMERCIAL

## 2024-03-13 ENCOUNTER — PREP FOR PROCEDURE (OUTPATIENT)
Dept: SURGERY | Facility: OTHER | Age: 84
End: 2024-03-13

## 2024-03-13 VITALS
OXYGEN SATURATION: 98 % | SYSTOLIC BLOOD PRESSURE: 134 MMHG | RESPIRATION RATE: 16 BRPM | HEIGHT: 59 IN | WEIGHT: 98 LBS | BODY MASS INDEX: 19.76 KG/M2 | DIASTOLIC BLOOD PRESSURE: 60 MMHG | HEART RATE: 84 BPM

## 2024-03-13 DIAGNOSIS — Z95.828 PORT-A-CATH IN PLACE: Primary | ICD-10-CM

## 2024-03-13 DIAGNOSIS — C91.11 CHRONIC LYMPHOCYTIC LEUKEMIA IN REMISSION (H): Primary | ICD-10-CM

## 2024-03-13 DIAGNOSIS — Z95.828 PORT-A-CATH IN PLACE: ICD-10-CM

## 2024-03-13 PROCEDURE — 99203 OFFICE O/P NEW LOW 30 MIN: CPT | Performed by: SURGERY

## 2024-03-13 PROCEDURE — G0463 HOSPITAL OUTPT CLINIC VISIT: HCPCS

## 2024-03-13 ASSESSMENT — PAIN SCALES - GENERAL: PAINLEVEL: NO PAIN (0)

## 2024-03-13 NOTE — PATIENT INSTRUCTIONS
Thank you for allowing Dr. Barbour and our surgical team to participate in your care. Please call our health unit coordinator at [phone] with scheduling questions or the nurse at [phone] with any other questions or concerns.      You have been scheduled for: Port Removal with  on 4/16/24.   Please see handout for additional instruction.  You will not need a pre-operative appointment with your primary care provider.  You may call [phone] or [phone] with any questions.

## 2024-03-14 NOTE — PROGRESS NOTES
"[facility] Progress Note    HPI: Comes to clinic to discuss port removal. Has history of CLL, has been in remission for many years port has been in place for 9 years.       S: She has been getting regular port flushes. She is very nervous about having this removed.     O:   Vitals:  /60 (BP Location: Right arm, Cuff Size: Adult Regular)   Pulse 84   Resp 16   Ht 1.499 m (4' 11\")   Wt 44.5 kg (98 lb)   SpO2 98%   BMI 19.79 kg/m        Physical Exam:  G: alert oriented, no acute distress   ENT: sclera non-icteric   Pulm: eft upper chest port with cathter going under the clavicle.   CVS: RRR  ABD: soft non-tender non-distended   Ext: WWP     Assessment/Plan:  83 y.o. female will plan on port removal, discussed that be more difficult to remove with being in for so long. Will plan to do under MAC.     Tez Barbour MD     "

## 2024-04-09 ENCOUNTER — ANESTHESIA EVENT (OUTPATIENT)
Dept: SURGERY | Facility: HOSPITAL | Age: 84
End: 2024-04-09
Payer: COMMERCIAL

## 2024-04-09 NOTE — ANESTHESIA PREPROCEDURE EVALUATION
Anesthesia Pre-Procedure Evaluation    Patient: Jennifer Barajas   MRN: [id number] : 1940        Procedure : Procedure(s):  Port-a-cath removal          Past Medical History:   Diagnosis Date     Arthritis     back, fingers, legs     Cancer (H)     chronic lymphocytic leukemia     Chronic lymphoid leukemia, 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 2012     Diverticulosis of colon (without mention of hemorrhage) 2010     Family history of ischemic heart disease 2010     Family history of malignant neoplasm of gastrointestinal tract 10/14/2002     History of blood transfusion     prior to chemo     Non-toxic multinodular goiter 2016     Osteoporosis, unspecified 2010     Other and unspecified hyperlipidemia 2011     Viral warts, unspecified 2011      Past Surgical History:   Procedure Laterality Date     ------------OTHER-------------      elbow repair     COLONOSCOPY  2010    repeat in      COLONOSCOPY      SEVERAL COLONOSCOPIES - ALL BEEN NORMAL - REFUSES ANY MORE      COLONOSCOPY       INSERT PORT VASCULAR ACCESS N/A 2015    Procedure: INSERT PORT VASCULAR ACCESS;  Surgeon: Linda Oliver MD;  Location: [facility]     IR PORT CHECK LEFT  2020     ORTHOPEDIC SURGERY  1993    Left elbow repair      Allergies   Allergen Reactions     Sulfa Antibiotics Rash     Rituxan [Rituximab]      Simvastatin Other (See Comments)     Takes pravastatin at home  Zocor - myalgia      Social History     Tobacco Use     Smoking status: Never     Smokeless tobacco: Never   Substance Use Topics     Alcohol use: No      Wt Readings from Last 1 Encounters:   24 44.5 kg (98 lb)        Anesthesia Evaluation   Pt has had prior anesthetic.     No history of anesthetic complications       ROS/MED HX  ENT/Pulmonary:       Neurologic:  - neg neurologic ROS     Cardiovascular:     (+) Dyslipidemia - -   -  - -                                      METS/Exercise  "Tolerance: >4 METS    Hematologic:     (+)      anemia, history of blood transfusion, no previous transfusion reaction, Known PRBC Anitbodies:No       Musculoskeletal:   (+)  arthritis,             GI/Hepatic:  - neg GI/hepatic ROS     Renal/Genitourinary:  - neg Renal ROS     Endo:     (+)          thyroid problem, goiter,           Psychiatric/Substance Use:  - neg psychiatric ROS     Infectious Disease:  - neg infectious disease ROS     Malignancy:   (+) Malignancy, History of Lymphoma/Leukemia.Lymph CA Remission status post Chemo.      Other:  - neg other ROS          Physical Exam    Airway        Mallampati: II   TM distance: > 3 FB   Neck ROM: limited   Mouth opening: > 3 cm    Respiratory Devices and Support         Dental       (+) Modest Abnormalities - crowns, retainers, 1 or 2 missing teeth      Cardiovascular   cardiovascular exam normal       Rhythm and rate: regular and normal     Pulmonary   pulmonary exam normal        breath sounds clear to auscultation       OUTSIDE LABS:  CBC:   Lab Results   Component Value Date    WBC 8.9 03/07/2024    WBC 9.0 09/21/2023    HGB 13.1 03/07/2024    HGB 13.4 09/21/2023    HCT 39.7 03/07/2024    HCT 39.4 09/21/2023     03/07/2024     09/21/2023     BMP:   Lab Results   Component Value Date     03/07/2024     09/21/2023    POTASSIUM 3.9 03/07/2024    POTASSIUM 4.0 09/21/2023    CHLORIDE 102 03/07/2024    CHLORIDE 101 09/21/2023    CO2 26 03/07/2024    CO2 25 09/21/2023    BUN 14.9 03/07/2024    BUN 14.0 09/21/2023    CR 0.77 03/07/2024    CR 0.80 09/21/2023    GLC 94 03/07/2024     (H) 09/21/2023     COAGS: No results found for: \"PTT\", \"INR\", \"FIBR\"  POC: No results found for: \"BGM\", \"HCG\", \"HCGS\"  HEPATIC:   Lab Results   Component Value Date    ALBUMIN 4.2 03/07/2024    PROTTOTAL 7.0 03/07/2024    ALT 19 03/07/2024    AST 31 03/07/2024    ALKPHOS 90 03/07/2024    BILITOTAL 0.5 03/07/2024     OTHER:   Lab Results   Component Value " Date    ELVIRA 9.5 03/07/2024    MAG 2.2 01/30/2015    LIPASE 161 02/04/2014    AMYLASE 63 02/04/2014    TSH 2.62 05/10/2023    T4 0.97 12/30/2015       Anesthesia Plan    ASA Status:  3    NPO Status:  NPO Appropriate    Anesthesia Type: MAC.     - Reason for MAC: straight local not clinically adequate   Induction: Intravenous, Propofol.   Maintenance: Balanced.        Consents    Anesthesia Plan(s) and associated risks, benefits, and realistic alternatives discussed. Questions answered and patient/representative(s) expressed understanding.     - Discussed: Risks, Benefits and Alternatives for BOTH SEDATION and the PROCEDURE were discussed     - Discussed with:  Patient      - Extended Intubation/Ventilatory Support Discussed: No.      - Patient is DNR/DNI Status: No     Use of blood products discussed: No .     Postoperative Care            Comments:               MARQUIS Mathew CRNA    I have reviewed the pertinent notes and labs in the chart from the past 30 days and (re)examined the patient.  Any updates or changes from those notes are reflected in this note.

## 2024-04-16 ENCOUNTER — HOSPITAL ENCOUNTER (OUTPATIENT)
Facility: HOSPITAL | Age: 84
Discharge: HOME | End: 2024-04-16
Attending: SURGERY | Admitting: SURGERY
Payer: COMMERCIAL

## 2024-04-16 ENCOUNTER — ANESTHESIA (OUTPATIENT)
Dept: SURGERY | Facility: HOSPITAL | Age: 84
End: 2024-04-16
Payer: COMMERCIAL

## 2024-04-16 VITALS
SYSTOLIC BLOOD PRESSURE: 137 MMHG | RESPIRATION RATE: 16 BRPM | DIASTOLIC BLOOD PRESSURE: 67 MMHG | HEART RATE: 77 BPM | BODY MASS INDEX: 19.76 KG/M2 | WEIGHT: 98 LBS | OXYGEN SATURATION: 96 % | TEMPERATURE: 97 F | HEIGHT: 59 IN

## 2024-04-16 PROCEDURE — 250N000009 HC RX 250: Performed by: SURGERY

## 2024-04-16 PROCEDURE — 258N000003 HC RX IP 258 OP 636: Performed by: NURSE ANESTHETIST, CERTIFIED REGISTERED

## 2024-04-16 PROCEDURE — 360N000075 HC SURGERY LEVEL 2, PER MIN: Performed by: SURGERY

## 2024-04-16 PROCEDURE — 272N000001 HC OR GENERAL SUPPLY STERILE: Performed by: SURGERY

## 2024-04-16 PROCEDURE — 250N000011 HC RX IP 250 OP 636: Performed by: SURGERY

## 2024-04-16 PROCEDURE — 36590 REMOVAL TUNNELED CV CATH: CPT | Performed by: SURGERY

## 2024-04-16 PROCEDURE — 36590 REMOVAL TUNNELED CV CATH: CPT | Performed by: NURSE ANESTHETIST, CERTIFIED REGISTERED

## 2024-04-16 PROCEDURE — 370N000017 HC ANESTHESIA TECHNICAL FEE, PER MIN: Performed by: SURGERY

## 2024-04-16 PROCEDURE — 250N000009 HC RX 250: Performed by: NURSE ANESTHETIST, CERTIFIED REGISTERED

## 2024-04-16 PROCEDURE — 710N000012 HC RECOVERY PHASE 2, PER MINUTE: Performed by: SURGERY

## 2024-04-16 PROCEDURE — 99100 ANES PT EXTEME AGE<1 YR&>70: CPT | Performed by: NURSE ANESTHETIST, CERTIFIED REGISTERED

## 2024-04-16 PROCEDURE — 999N000141 HC STATISTIC PRE-PROCEDURE NURSING ASSESSMENT: Performed by: SURGERY

## 2024-04-16 PROCEDURE — 250N000011 HC RX IP 250 OP 636: Performed by: NURSE ANESTHETIST, CERTIFIED REGISTERED

## 2024-04-16 RX ORDER — BUPIVACAINE HYDROCHLORIDE 2.5 MG/ML
INJECTION, SOLUTION EPIDURAL; INFILTRATION; INTRACAUDAL
Status: DISCONTINUED
Start: 2024-04-16 | End: 2024-04-16 | Stop reason: HOSPADM

## 2024-04-16 RX ORDER — SODIUM CHLORIDE, SODIUM LACTATE, POTASSIUM CHLORIDE, CALCIUM CHLORIDE 600; 310; 30; 20 MG/100ML; MG/100ML; MG/100ML; MG/100ML
INJECTION, SOLUTION INTRAVENOUS CONTINUOUS
Status: DISCONTINUED | OUTPATIENT
Start: 2024-04-16 | End: 2024-04-16 | Stop reason: HOSPADM

## 2024-04-16 RX ORDER — IBUPROFEN 200 MG
600 TABLET ORAL
Status: DISCONTINUED | OUTPATIENT
Start: 2024-04-16 | End: 2024-04-16 | Stop reason: HOSPADM

## 2024-04-16 RX ORDER — ONDANSETRON 2 MG/ML
4 INJECTION INTRAMUSCULAR; INTRAVENOUS EVERY 30 MIN PRN
Status: DISCONTINUED | OUTPATIENT
Start: 2024-04-16 | End: 2024-04-16 | Stop reason: HOSPADM

## 2024-04-16 RX ORDER — FENTANYL CITRATE 50 UG/ML
INJECTION, SOLUTION INTRAMUSCULAR; INTRAVENOUS PRN
Status: DISCONTINUED | OUTPATIENT
Start: 2024-04-16 | End: 2024-04-16

## 2024-04-16 RX ORDER — LIDOCAINE HYDROCHLORIDE 10 MG/ML
INJECTION, SOLUTION EPIDURAL; INFILTRATION; INTRACAUDAL; PERINEURAL
Status: DISCONTINUED
Start: 2024-04-16 | End: 2024-04-16 | Stop reason: HOSPADM

## 2024-04-16 RX ORDER — LIDOCAINE HYDROCHLORIDE 20 MG/ML
INJECTION, SOLUTION INFILTRATION; PERINEURAL PRN
Status: DISCONTINUED | OUTPATIENT
Start: 2024-04-16 | End: 2024-04-16

## 2024-04-16 RX ORDER — LIDOCAINE 40 MG/G
CREAM TOPICAL
Status: DISCONTINUED | OUTPATIENT
Start: 2024-04-16 | End: 2024-04-16 | Stop reason: HOSPADM

## 2024-04-16 RX ORDER — NALOXONE HYDROCHLORIDE 0.4 MG/ML
0.1 INJECTION, SOLUTION INTRAMUSCULAR; INTRAVENOUS; SUBCUTANEOUS
Status: DISCONTINUED | OUTPATIENT
Start: 2024-04-16 | End: 2024-04-16 | Stop reason: HOSPADM

## 2024-04-16 RX ORDER — PROPOFOL 10 MG/ML
INJECTION, EMULSION INTRAVENOUS PRN
Status: DISCONTINUED | OUTPATIENT
Start: 2024-04-16 | End: 2024-04-16

## 2024-04-16 RX ORDER — ONDANSETRON 4 MG/1
4 TABLET, ORALLY DISINTEGRATING ORAL EVERY 30 MIN PRN
Status: DISCONTINUED | OUTPATIENT
Start: 2024-04-16 | End: 2024-04-16 | Stop reason: HOSPADM

## 2024-04-16 RX ADMIN — SODIUM CHLORIDE, POTASSIUM CHLORIDE, SODIUM LACTATE AND CALCIUM CHLORIDE: 600; 310; 30; 20 INJECTION, SOLUTION INTRAVENOUS at 09:40

## 2024-04-16 RX ADMIN — FENTANYL CITRATE 25 MCG: 50 INJECTION INTRAMUSCULAR; INTRAVENOUS at 11:28

## 2024-04-16 RX ADMIN — FENTANYL CITRATE 25 MCG: 50 INJECTION INTRAMUSCULAR; INTRAVENOUS at 11:16

## 2024-04-16 RX ADMIN — LIDOCAINE HYDROCHLORIDE 40 MG: 20 INJECTION, SOLUTION INFILTRATION; PERINEURAL at 11:14

## 2024-04-16 RX ADMIN — PROPOFOL 75 MCG/KG/MIN: 10 INJECTION, EMULSION INTRAVENOUS at 11:19

## 2024-04-16 RX ADMIN — MIDAZOLAM 2 MG: 1 INJECTION INTRAMUSCULAR; INTRAVENOUS at 11:14

## 2024-04-16 RX ADMIN — PROPOFOL 100 MG: 10 INJECTION, EMULSION INTRAVENOUS at 11:14

## 2024-04-16 ASSESSMENT — ACTIVITIES OF DAILY LIVING (ADL)
ADLS_ACUITY_SCORE: 22

## 2024-04-16 NOTE — H&P
Surgery Consult Clinic Note      RE: Jennifer Barajas  : 1940        Chief Complaint:  Port a cath removal    History of Present Illness:  Jennifer Barajas is a very pleasant 83 year old year old female who I am seeing at the request of Dr. De La Rosa for evaluation port a cath removal due to chemo completion for many year.  The port has been in place for 9 years.  She specifically denies fever, chills, nausea, vomiting, chest pain, shortness of breath or palpitations.          Medical history:  Past Medical History:   Diagnosis Date    Arthritis     back, fingers, legs    Cancer (H)     chronic lymphocytic leukemia    Chronic lymphoid leukemia, 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 2012    Diverticulosis of colon (without mention of hemorrhage) 2010    Family history of ischemic heart disease 2010    Family history of malignant neoplasm of gastrointestinal tract 10/14/2002    History of blood transfusion     prior to chemo    Non-toxic multinodular goiter 2016    Osteoporosis, unspecified 2010    Other and unspecified hyperlipidemia 2011    Viral warts, unspecified 2011       Surgical history:  Past Surgical History:   Procedure Laterality Date    ------------OTHER-------------      elbow repair    COLONOSCOPY  2010    repeat in     COLONOSCOPY      SEVERAL COLONOSCOPIES - ALL BEEN NORMAL - REFUSES ANY MORE     COLONOSCOPY      INSERT PORT VASCULAR ACCESS N/A 2015    Procedure: INSERT PORT VASCULAR ACCESS;  Surgeon: Linda Oliver MD;  Location: [facility]    IR PORT CHECK LEFT  2020    ORTHOPEDIC SURGERY      Left elbow repair       Family history:  Family History   Problem Relation Age of Onset    Cancer Mother 97        colon cancer - cause of death    Colon Cancer Mother     Cancer Father     Colon Cancer Father     Diabetes Brother     Obesity Brother     Breast Cancer Daughter     Hyperlipidemia Daughter     Thyroid Disease Daughter      Hypertension Son     Hyperlipidemia Son     Thyroid Disease Cousin     Hyperlipidemia Son     Coronary Artery Disease No family hx of     Cerebrovascular Disease No family hx of     Prostate Cancer No family hx of     Other Cancer No family hx of     Anesthesia Reaction No family hx of     Asthma No family hx of     Genetic Disorder No family hx of        Medications:  Prior to Admission medications    Medication Sig Start Date End Date Taking? Authorizing Provider   Ascorbic Acid (VITAMIN C PO) Take 500 mg by mouth daily   Yes Reported, Patient   calcium carbonate (OS-ELVIRA) 1500 (600 Ca) MG tablet Take 600 mg by mouth daily   Yes Reported, Patient   Cholecalciferol (VITAMIN D3 PO) Take by mouth daily Calcium/ vitamin D3 combination   Yes Reported, Patient   lidocaine-prilocaine (EMLA) 2.5-2.5 % external cream Apply topically as needed for moderate pain Apply to port site 1 hour before chemotherapy appointment 9/21/23  Yes Yaneli Gray, NP   Multiple Vitamins-Minerals (CENTRUM SILVER 50+WOMEN PO)    Yes Reported, Patient       Allergies:  The patient is allergic to sulfa antibiotics, rituxan [rituximab], and simvastatin.  .  Social history:  Social History     Tobacco Use    Smoking status: Never    Smokeless tobacco: Never   Substance Use Topics    Alcohol use: No     Marital status: .    Review of Systems:    Constitutional: Negative for fever, chills and weight loss.   HENT: Negative for ear pain, nosebleeds, congestion, sore throat, tinnitus and ear discharge.    Eyes: Negative for blurred vision, double vision, photophobia and pain.   Respiratory: Negative for cough, hemoptysis, shortness of breath, wheezing and stridor.     Cardiovascular: Negative for chest pain, palpitations and orthopnea.   Gastrointestinal: Negative for heartburn, nausea, vomiting, abdominal pain and blood in stool.   Genitourinary: Negative for urgency, frequency and hematuria.   Musculoskeletal: Negative for  "myalgias, back pain and joint pain.   Neurological: Negative for tingling, speech change and headaches.   Endo/Heme/Allergies: Does not bruise/bleed easily.   Psychiatric/Behavioral: Negative for depression, suicidal ideas and hallucinations. The patient is not nervous/anxious.    Physical Examination:  /74   Pulse 91   Temp 96.9  F (36.1  C) (Tympanic)   Resp 16   Ht 1.499 m (4' 11\")   Wt 44.5 kg (98 lb)   SpO2 99%   BMI 19.79 kg/m    General: Alert and orientedx4, no acute distress, well-developed/well-nourished, ambulating without assistance  HEENT: normocephalic atraumatic, extraocular movements intact, PERRL Sclerae anicteric; Trachea mideline, no JVD  Chest:   Clear to auscultation bilaterally.    Cardiac: S1S2 , regular rate and rhythm without additional sounds  Abdomen: Soft, non-tender, non-distended  Extremities: Cursory exam unremarkable.  No peripheral edema noted.  Skin: Warm, dry, less than 2 sec cap refill  Neuro: CN 2-12 grossly intact, no focal deficit, GCS 15  Psych: Pleasant, calm, asks appropriate questions      Assessment/Plan:  #1 Port a cath removal      Thank you for the consult. I had a discussion with Jennifer about the risk of bleeding, risk of infection.  The patient verbalize's understanding that despite reducing risks it does not remove them completely.  She is requesting immediate removal of the port a cath.  The patient's questions were asked and answered.  We will proceed as scheduled with port a cath removal with Dr. Barbour.     Sharon Tesfaye [facility] [place] [address]    Referring Provider:  No referring provider defined for this encounter.     Primary Care Provider:  Zheng De La Rosa   "

## 2024-04-16 NOTE — ANESTHESIA CARE TRANSFER NOTE
Patient: Jennifer Barajas    Procedure: Procedure(s):  Port-a-cath removal       Diagnosis: Port-A-Cath in place [Z95.828]  Diagnosis Additional Information: No value filed.    Anesthesia Type:   MAC     Note:    Oropharynx: spontaneously breathing  Level of Consciousness: drowsy  Oxygen Supplementation: nasal cannula    Independent Airway: airway patency satisfactory and stable  Dentition: dentition unchanged  Vital Signs Stable: post-procedure vital signs reviewed and stable  Report to RN Given: handoff report given  Patient transferred to: Phase II    Handoff Report: Identifed the Patient, Identified the Reponsible Provider, Reviewed the pertinent medical history, Discussed the surgical course, Reviewed Intra-OP anesthesia mangement and issues during anesthesia, Set expectations for post-procedure period and Allowed opportunity for questions and acknowledgement of understanding      Vitals:  Vitals Value Taken Time   BP     Temp     Pulse     Resp     SpO2         Electronically Signed By: MARQUIS Ascencio CRNA  April 16, 2024  11:46 AM

## 2024-04-16 NOTE — DISCHARGE INSTRUCTIONS
After Anesthesia (Sleep Medicine)  What should I do after anesthesia?  You should rest and relax for the next 24 hours. Avoid risky or difficult (strenuous) activity. A responsible adult should stay with you overnight.  Don't drive or use any heavy equipment for 24 hours. Even if you feel normal, your reactions may be affected by the sleep medicine given to you.  Don't drink alcohol or make any important decisions for 24 hours.  Slowly get back to your regular diet, as you feel able.  How should I expect to feel?  It's normal to feel dizzy, light-headed, or faint for up to a full day after anesthesia or while taking pain medicine. If this happens:   Sit down for a few minutes before standing.  Have someone help you when you get up to walk or use the bathroom.  If you have nausea (feel sick to your stomach) or vomit (throw up):   Drink clear liquids (such as apple juice, ginger ale, broth, or 7UP) until you feel better.  If you feel sick to your stomach, or you keep vomiting for 24 hours, please call the doctor.  What else should I know?  You might have a dry mouth, sore throat, muscle aches, or trouble sleeping. These should go away after 24 hours.  Please contact your doctor if you have any other symptoms that concern you, such as fever, pain, bleeding, fluid drainage, swelling, or headache, or if it's been over 8 to 10 hours and you still aren't able to pee (urinate).  If you have a history of sleep apnea, it's very important to use your CPAP machine for the next 24 hours when you nap or sleep.   For informational purposes only. Not to replace the advice of your health care provider. Copyright   2023 [employer]. All rights reserved. Clinically reviewed by Red Lyons MD. [employer] [id number] - REV 09/23.    For informational purposes only. Not to replace the advice of your health care provider. Copyrighted material adapted with permission from [employer]. Published by [employer]. Clinically reviewed by Red Lyons MD. [employer] [id number] - 06/23.  Wound Care Instructions  For Surgical and Non-surgical Patients  Overview  People have wounds that need care for many reasons. You may have a cut that needs care after surgery. You may have a cut, scrape, or puncture wound from an accident. Or you may have a wound because of a boil, abscess, or a condition like diabetes.  Whatever the cause of your wound, there are things you can do to care for it at home.  Your care team may also want you to come back for a wound check. The wound check lets the care team know how your wound is healing and if you need more treatment.  Follow-up care is a key part of your treatment and safety. Be sure to make and go to all appointments, and call your care team if you are having problems. It's also a good idea to know your test results and keep a list of the medicines you take.  How can you care for yourself at home?  Wash your hands with soap and water before changing your bandage or touching your wound.  If your care team told you how to care for your wound, follow their instructions. If you did not get instructions, follow this general advice for minor wounds:  Leave the wound open to air (no bandage), unless it's leaking fluid.  If your wound is leaking fluid: Place a dry bandage (gauze) over the wound. Change it daily.  If the bandage sticks to the wound: You may cover the wound with a thin layer of petroleum jelly, such as Vaseline, then add the bandage. Or use a nonstick bandage (like Vaseline Gauze).  Avoid using an antibiotic ointment unless your care team recommends it.  Keep the wound dry for the first 24 to 48 hours. After this, you can shower if your care team okays it. Pat the wound dry with a fresh piece of gauze. (Do not soak in a bath, pool, lake, or other water until the wound has fully healed and closed.)  If your care team prescribed antibiotics, take them as directed. Do not stop  "taking them just because you feel better. You need to take the full course of antibiotics.  If you have stitches, do not remove them on your own. Your care team will tell you when to come back to have them removed.  If you have tape strips, leave them on until they fall off. If they don't fall off on their own, you can remove them after 1 week.  If you have medical glue (\"liquid stitches\") over your wound:  Do not put any kind of ointment, cream, or lotion on the skin around the wound. This can make the glue fall off too soon.  Leave the glue on your skin until it falls off on its own. This may take 5 to 10 days.  Do not scratch, rub, or pick at the glue.  Do not put the sticky part of a bandage directly on the glue.  If possible, prop up the injured area on a pillow anytime you sit or lie down during the next 3 days. Try to keep it above the level of your heart. This will help reduce swelling.  When should you call for help?  Call your care team now or seek immediate medical care if:  You have new pain, or the pain gets worse.  The skin near the wound is cold or pale or changes color.  You have tingling, weakness, or numbness near the wound.  The wound starts to bleed, and blood soaks through the bandage. Oozing small amounts of blood is normal.  You have symptoms of infection, such as:  Increased pain, swelling, warmth, or redness.  Red streaks leading from the wound.  Pus draining from the wound.  A fever.  Watch closely for changes in your health, and be sure to contact your care team if:  You do not get better as expected.      "

## 2024-04-16 NOTE — OR NURSING
Patient and responsible adult given discharge instructions with no questions regarding instructions. Lani score 20/20. Pain level 0/10.  Discharged from unit via ambulatory. Patient discharged to home.

## 2024-04-16 NOTE — OP NOTE
[facility]   Operative Note    Pre-operative diagnosis: Port-A-Cath in place [Z95.828]   Post-operative diagnosis Port removed intact    Procedure: Procedure(s):  Port-a-cath removal   Surgeon(s): Surgeons and Role:     * Tez Barbour MD - Primary     * Sharon Tesfaye APRN CNS - Assisting   Estimated blood loss: * No values recorded between 4/16/2024 11:23 AM and 4/16/2024 11:39 AM *    Specimens: * No specimens in log *   Findings: Port removed intact      Description of procedure:   With the patient in the supine position on the operating table.  The above mentioned side was prepped and draped sterilely.  Local-MAC anesthesia was obtained with infiltration of 0.25% marcaine with 1% lidocaine.   The prior insertion scar was excised and the incision was taken through full thickness skin and subcutaneous tissue with hemostasis obtained with electrocautery.   The Port-A-Cath catheter, reservoir and neocapsule were grasped and dissected free from the subcutaneous tissue with electrocautery.  The port and catheter were retrieved in total. The tip of the catheter was intact.  A U stitch was placed in the prior tract.   The subcutaneous tissues were closed in layers with interrupted 3-0 Vicryl and the skin was reapproximated with subdermal 4-0 monocryl reinforced with Dermabond. She was returned to day surgery in good condition without suggestion of complication upon confirmation that the final sponge needle and instrument counts were correct.  Blood loss was 5cc.       Tez Barbour MD

## 2024-06-15 ENCOUNTER — HEALTH MAINTENANCE LETTER (OUTPATIENT)
Age: 84
End: 2024-06-15

## 2024-08-16 ENCOUNTER — MYC MEDICAL ADVICE (OUTPATIENT)
Dept: FAMILY MEDICINE | Facility: OTHER | Age: 84
End: 2024-08-16

## 2024-08-16 NOTE — TELEPHONE ENCOUNTER
Pt called and was informed that PCP is currently out of the clinic and offered and appointment with a covering provider.  Pt declined and stated that she has been on a liquid diet for the past day and is feeling better.  Pt stated that she will stay on the liquid diet for another day and see if it continue to get better.   If she decides she would like to see a covering provider she will call back.  Per pt if it gets worse over the weekend she will go to the ED to get evaluated.

## 2024-09-08 ASSESSMENT — PATIENT HEALTH QUESTIONNAIRE - PHQ9
SUM OF ALL RESPONSES TO PHQ QUESTIONS 1-9: 0
10. IF YOU CHECKED OFF ANY PROBLEMS, HOW DIFFICULT HAVE THESE PROBLEMS MADE IT FOR YOU TO DO YOUR WORK, TAKE CARE OF THINGS AT HOME, OR GET ALONG WITH OTHER PEOPLE: NOT DIFFICULT AT ALL
SUM OF ALL RESPONSES TO PHQ QUESTIONS 1-9: 0

## 2024-09-08 ASSESSMENT — ANXIETY QUESTIONNAIRES
GAD7 TOTAL SCORE: 0
7. FEELING AFRAID AS IF SOMETHING AWFUL MIGHT HAPPEN: NOT AT ALL

## 2024-09-09 ENCOUNTER — LAB (OUTPATIENT)
Dept: LAB | Facility: OTHER | Age: 84
End: 2024-09-09
Attending: FAMILY MEDICINE
Payer: COMMERCIAL

## 2024-09-09 ENCOUNTER — OFFICE VISIT (OUTPATIENT)
Dept: FAMILY MEDICINE | Facility: OTHER | Age: 84
End: 2024-09-09
Attending: FAMILY MEDICINE
Payer: COMMERCIAL

## 2024-09-09 VITALS
HEART RATE: 79 BPM | BODY MASS INDEX: 19.42 KG/M2 | HEIGHT: 59 IN | TEMPERATURE: 97.8 F | DIASTOLIC BLOOD PRESSURE: 74 MMHG | OXYGEN SATURATION: 98 % | SYSTOLIC BLOOD PRESSURE: 136 MMHG | RESPIRATION RATE: 16 BRPM | WEIGHT: 96.31 LBS

## 2024-09-09 DIAGNOSIS — M81.0 AGE-RELATED OSTEOPOROSIS WITHOUT CURRENT PATHOLOGICAL FRACTURE: ICD-10-CM

## 2024-09-09 DIAGNOSIS — R79.89 ELEVATED TSH: ICD-10-CM

## 2024-09-09 DIAGNOSIS — E78.2 MIXED HYPERLIPIDEMIA: ICD-10-CM

## 2024-09-09 DIAGNOSIS — Z71.85 IMMUNIZATION COUNSELING: ICD-10-CM

## 2024-09-09 DIAGNOSIS — E04.2 NON-TOXIC MULTINODULAR GOITER: ICD-10-CM

## 2024-09-09 DIAGNOSIS — C91.10 CHRONIC LYMPHOCYTIC LEUKEMIA (H): ICD-10-CM

## 2024-09-09 DIAGNOSIS — C91.10 CHRONIC LYMPHOCYTIC LEUKEMIA (H): Primary | ICD-10-CM

## 2024-09-09 LAB
ALBUMIN SERPL BCG-MCNC: 4.5 G/DL (ref 3.5–5.2)
ALP SERPL-CCNC: 90 U/L (ref 40–150)
ALT SERPL W P-5'-P-CCNC: 20 U/L (ref 0–50)
ANION GAP SERPL CALCULATED.3IONS-SCNC: 11 MMOL/L (ref 7–15)
AST SERPL W P-5'-P-CCNC: 32 U/L (ref 0–45)
BASOPHILS # BLD AUTO: 0 10E3/UL (ref 0–0.2)
BASOPHILS NFR BLD AUTO: 1 %
BILIRUB SERPL-MCNC: 0.7 MG/DL
BUN SERPL-MCNC: 12.1 MG/DL (ref 8–23)
CALCIUM SERPL-MCNC: 9.7 MG/DL (ref 8.8–10.4)
CHLORIDE SERPL-SCNC: 104 MMOL/L (ref 98–107)
CHOLEST SERPL-MCNC: 288 MG/DL
CREAT SERPL-MCNC: 0.85 MG/DL (ref 0.51–0.95)
EGFRCR SERPLBLD CKD-EPI 2021: 68 ML/MIN/1.73M2
EOSINOPHIL # BLD AUTO: 0.2 10E3/UL (ref 0–0.7)
EOSINOPHIL NFR BLD AUTO: 2 %
ERYTHROCYTE [DISTWIDTH] IN BLOOD BY AUTOMATED COUNT: 13.8 % (ref 10–15)
FASTING STATUS PATIENT QL REPORTED: YES
FASTING STATUS PATIENT QL REPORTED: YES
GLUCOSE SERPL-MCNC: 104 MG/DL (ref 70–99)
HCO3 SERPL-SCNC: 23 MMOL/L (ref 22–29)
HCT VFR BLD AUTO: 42.6 % (ref 35–47)
HDLC SERPL-MCNC: 68 MG/DL
HGB BLD-MCNC: 14 G/DL (ref 11.7–15.7)
IMM GRANULOCYTES # BLD: 0 10E3/UL
IMM GRANULOCYTES NFR BLD: 0 %
LDLC SERPL CALC-MCNC: 175 MG/DL
LYMPHOCYTES # BLD AUTO: 1.6 10E3/UL (ref 0.8–5.3)
LYMPHOCYTES NFR BLD AUTO: 21 %
MCH RBC QN AUTO: 30 PG (ref 26.5–33)
MCHC RBC AUTO-ENTMCNC: 32.9 G/DL (ref 31.5–36.5)
MCV RBC AUTO: 91 FL (ref 78–100)
MONOCYTES # BLD AUTO: 0.5 10E3/UL (ref 0–1.3)
MONOCYTES NFR BLD AUTO: 7 %
NEUTROPHILS # BLD AUTO: 5.3 10E3/UL (ref 1.6–8.3)
NEUTROPHILS NFR BLD AUTO: 69 %
NONHDLC SERPL-MCNC: 220 MG/DL
NRBC # BLD AUTO: 0 10E3/UL
NRBC BLD AUTO-RTO: 0 /100
PLATELET # BLD AUTO: 206 10E3/UL (ref 150–450)
POTASSIUM SERPL-SCNC: 4.2 MMOL/L (ref 3.4–5.3)
PROT SERPL-MCNC: 7.4 G/DL (ref 6.4–8.3)
RBC # BLD AUTO: 4.67 10E6/UL (ref 3.8–5.2)
SODIUM SERPL-SCNC: 138 MMOL/L (ref 135–145)
TRIGL SERPL-MCNC: 226 MG/DL
TSH SERPL DL<=0.005 MIU/L-ACNC: 7.13 UIU/ML (ref 0.3–4.2)
WBC # BLD AUTO: 7.6 10E3/UL (ref 4–11)

## 2024-09-09 PROCEDURE — 99214 OFFICE O/P EST MOD 30 MIN: CPT | Performed by: FAMILY MEDICINE

## 2024-09-09 PROCEDURE — 36415 COLL VENOUS BLD VENIPUNCTURE: CPT | Mod: ZL

## 2024-09-09 PROCEDURE — G0463 HOSPITAL OUTPT CLINIC VISIT: HCPCS | Mod: 25

## 2024-09-09 PROCEDURE — G2211 COMPLEX E/M VISIT ADD ON: HCPCS | Performed by: FAMILY MEDICINE

## 2024-09-09 PROCEDURE — 84443 ASSAY THYROID STIM HORMONE: CPT | Mod: ZL

## 2024-09-09 PROCEDURE — 80061 LIPID PANEL: CPT | Mod: ZL

## 2024-09-09 PROCEDURE — 80053 COMPREHEN METABOLIC PANEL: CPT | Mod: ZL

## 2024-09-09 PROCEDURE — 85004 AUTOMATED DIFF WBC COUNT: CPT | Mod: ZL

## 2024-09-09 PROCEDURE — 90662 IIV NO PRSV INCREASED AG IM: CPT

## 2024-09-09 ASSESSMENT — PAIN SCALES - GENERAL: PAINLEVEL: NO PAIN (0)

## 2024-09-09 NOTE — PROGRESS NOTES
Assessment & Plan     Chronic lymphocytic leukemia (H)  In Remission - doing well  - CBC with Platelets & Differential; Future  - Comprehensive metabolic panel; Future  - TSH; Future  - Lipid Profile; Future    Mixed hyperlipidemia   Stable but high . No meds wanted   - CBC with Platelets & Differential; Future  - Comprehensive metabolic panel; Future  - TSH; Future  - Lipid Profile; Future    Non-toxic multinodular goiter  TSH up today . Recheck in 6 weeks then decide If needs treating   - CBC with Platelets & Differential; Future  - Comprehensive metabolic panel; Future  - TSH; Future  - Lipid Profile; Future  - TSH with free T4 reflex; Future    Age-related osteoporosis without current pathological fracture  Active and on VitD/Ca- as on Fosamax. Last Dexa reviewed  - CBC with Platelets & Differential; Future  - Comprehensive metabolic panel; Future  - TSH; Future  - Lipid Profile; Future    Immunization counseling  RSV discussed. Flu shot given   - CBC with Platelets & Differential; Future  - Comprehensive metabolic panel; Future  - TSH; Future  - Lipid Profile; Future    Elevated TSH  As above. Lab only appt for 6-8 weeks made to recheck this   - TSH with free T4 reflex; Future            Counseling  Appropriate preventive services were addressed with this patient via screening, questionnaire, or discussion as appropriate for fall prevention, nutrition, physical activity, Tobacco-use cessation, social engagement, weight loss and cognition.  Checklist reviewing preventive services available has been given to the patient.  Reviewed patient's diet, addressing concerns and/or questions.   She is at risk for lack of exercise and has been provided with information to increase physical activity for the benefit of her well-being.   The patient was instructed to see the dentist every 6 months.   The patient was provided with written information regarding signs of hearing loss.           No follow-ups on  "file.    Subjective   Jennifer is a 83 year old, presenting for the following health issues:  Lipids        9/9/2024     7:50 AM   Additional Questions   Roomed by Susan John   Accompanied by None         9/9/2024     7:50 AM   Patient Reported Additional Medications   Patient reports taking the following new medications Zyrtec     History of Present Illness       Reason for visit:  Wellness check   She is taking medications regularly.       Hyperlipidemia Follow-Up    Are you regularly taking any medication or supplement to lower your cholesterol?   No  Are you having muscle aches or other side effects that you think could be caused by your cholesterol lowering medication?  Patient does not take a cholesterol lowering medication     Doing great    Has long h/o CLL- in Remission   Sees Onc yearly     No concerns  Wants fairly hands off care       Review of Systems  Constitutional, neuro, ENT, endocrine, pulmonary, cardiac, gastrointestinal, genitourinary, musculoskeletal, integument and psychiatric systems are negative, except as otherwise noted.      Objective    /74 (BP Location: Left arm, Patient Position: Sitting, Cuff Size: Adult Small)   Pulse 79   Temp 97.8  F (36.6  C) (Tympanic)   Resp 16   Ht 1.499 m (4' 11\")   Wt 43.7 kg (96 lb 5 oz)   SpO2 98%   BMI 19.45 kg/m    Body mass index is 19.45 kg/m .  Physical Exam   GENERAL: alert and no distress  EYES: Eyes grossly normal to inspection, PERRL and conjunctivae and sclerae normal  HENT: ear canals and TM's normal, nose and mouth without ulcers or lesions  NECK: no adenopathy, no asymmetry, masses, or scars  RESP: lungs clear to auscultation - no rales, rhonchi or wheezes  BREAST: normal without masses, tenderness or nipple discharge and no palpable axillary masses or adenopathy  CV: regular rate and rhythm, normal S1 S2, no S3 or S4, no murmur, click or rub, no peripheral edema  ABDOMEN: soft, nontender, no hepatosplenomegaly, no masses and " bowel sounds normal  MS: no gross musculoskeletal defects noted, no edema  SKIN: no suspicious lesions or rashes  NEURO: Normal strength and tone, mentation intact and speech normal  PSYCH: mentation appears normal, affect normal/bright  LYMPH: no cervical, supraclavicular, axillary, or inguinal adenopathy    Results for orders placed or performed in visit on 09/09/24   Comprehensive metabolic panel     Status: Abnormal   Result Value Ref Range    Sodium 138 135 - 145 mmol/L    Potassium 4.2 3.4 - 5.3 mmol/L    Carbon Dioxide (CO2) 23 22 - 29 mmol/L    Anion Gap 11 7 - 15 mmol/L    Urea Nitrogen 12.1 8.0 - 23.0 mg/dL    Creatinine 0.85 0.51 - 0.95 mg/dL    GFR Estimate 68 >60 mL/min/1.73m2    Calcium 9.7 8.8 - 10.4 mg/dL    Chloride 104 98 - 107 mmol/L    Glucose 104 (H) 70 - 99 mg/dL    Alkaline Phosphatase 90 40 - 150 U/L    AST 32 0 - 45 U/L    ALT 20 0 - 50 U/L    Protein Total 7.4 6.4 - 8.3 g/dL    Albumin 4.5 3.5 - 5.2 g/dL    Bilirubin Total 0.7 <=1.2 mg/dL    Patient Fasting > 8hrs? Yes    TSH     Status: Abnormal   Result Value Ref Range    TSH 7.13 (H) 0.30 - 4.20 uIU/mL   Lipid Profile     Status: Abnormal   Result Value Ref Range    Cholesterol 288 (H) <200 mg/dL    Triglycerides 226 (H) <150 mg/dL    Direct Measure HDL 68 >=50 mg/dL    LDL Cholesterol Calculated 175 (H) <=100 mg/dL    Non HDL Cholesterol 220 (H) <130 mg/dL    Patient Fasting > 8hrs? Yes     Narrative    Cholesterol  Desirable:  <200 mg/dL    Triglycerides  Normal:  Less than 150 mg/dL  Borderline High:  150-199 mg/dL  High:  200-499 mg/dL  Very High:  Greater than or equal to 500 mg/dL    Direct Measure HDL  Female:  Greater than or equal to 50 mg/dL   Male:  Greater than or equal to 40 mg/dL    LDL Cholesterol  Desirable:  <100mg/dL  Above Desirable:  100-129 mg/dL   Borderline High:  130-159 mg/dL   High:  160-189 mg/dL   Very High:  >= 190 mg/dL    Non HDL Cholesterol  Desirable:  130 mg/dL  Above Desirable:  130-159  mg/dL  Borderline High:  160-189 mg/dL  High:  190-219 mg/dL  Very High:  Greater than or equal to 220 mg/dL   CBC with platelets and differential     Status: None   Result Value Ref Range    WBC Count 7.6 4.0 - 11.0 10e3/uL    RBC Count 4.67 3.80 - 5.20 10e6/uL    Hemoglobin 14.0 11.7 - 15.7 g/dL    Hematocrit 42.6 35.0 - 47.0 %    MCV 91 78 - 100 fL    MCH 30.0 26.5 - 33.0 pg    MCHC 32.9 31.5 - 36.5 g/dL    RDW 13.8 10.0 - 15.0 %    Platelet Count 206 150 - 450 10e3/uL    % Neutrophils 69 %    % Lymphocytes 21 %    % Monocytes 7 %    % Eosinophils 2 %    % Basophils 1 %    % Immature Granulocytes 0 %    NRBCs per 100 WBC 0 <1 /100    Absolute Neutrophils 5.3 1.6 - 8.3 10e3/uL    Absolute Lymphocytes 1.6 0.8 - 5.3 10e3/uL    Absolute Monocytes 0.5 0.0 - 1.3 10e3/uL    Absolute Eosinophils 0.2 0.0 - 0.7 10e3/uL    Absolute Basophils 0.0 0.0 - 0.2 10e3/uL    Absolute Immature Granulocytes 0.0 <=0.4 10e3/uL    Absolute NRBCs 0.0 10e3/uL   CBC with Platelets & Differential     Status: None    Narrative    The following orders were created for panel order CBC with Platelets & Differential.  Procedure                               Abnormality         Status                     ---------                               -----------         ------                     CBC with platelets and d...[[id number]]                      Final result                 Please view results for these tests on the individual orders.             Signed Electronically by: Zheng De La Rosa MD

## 2024-10-23 ENCOUNTER — LAB (OUTPATIENT)
Dept: LAB | Facility: OTHER | Age: 84
End: 2024-10-23
Payer: COMMERCIAL

## 2024-10-23 DIAGNOSIS — E03.9 ACQUIRED HYPOTHYROIDISM: Primary | ICD-10-CM

## 2024-10-23 DIAGNOSIS — R79.89 ELEVATED TSH: ICD-10-CM

## 2024-10-23 DIAGNOSIS — E04.2 NON-TOXIC MULTINODULAR GOITER: ICD-10-CM

## 2024-10-23 LAB
T4 FREE SERPL-MCNC: 1.05 NG/DL (ref 0.9–1.7)
TSH SERPL DL<=0.005 MIU/L-ACNC: 8.01 UIU/ML (ref 0.3–4.2)

## 2024-10-23 PROCEDURE — 84439 ASSAY OF FREE THYROXINE: CPT | Mod: ZL

## 2024-10-23 PROCEDURE — 36415 COLL VENOUS BLD VENIPUNCTURE: CPT | Mod: ZL

## 2024-10-23 PROCEDURE — 84443 ASSAY THYROID STIM HORMONE: CPT | Mod: ZL

## 2024-10-23 RX ORDER — LEVOTHYROXINE SODIUM 25 UG/1
25 TABLET ORAL DAILY
Qty: 90 TABLET | Refills: 0 | Status: SHIPPED | OUTPATIENT
Start: 2024-10-23

## 2025-01-07 ENCOUNTER — MYC REFILL (OUTPATIENT)
Dept: FAMILY MEDICINE | Facility: OTHER | Age: 85
End: 2025-01-07

## 2025-01-07 DIAGNOSIS — E03.9 ACQUIRED HYPOTHYROIDISM: ICD-10-CM

## 2025-01-08 RX ORDER — LEVOTHYROXINE SODIUM 25 UG/1
25 TABLET ORAL DAILY
Qty: 90 TABLET | Refills: 3 | Status: SHIPPED | OUTPATIENT
Start: 2025-01-08

## 2025-01-08 NOTE — TELEPHONE ENCOUNTER
levothyroxine (SYNTHROID/LEVOTHROID) 25 MCG tablet       Last Written Prescription Date:  10/23/24  Last Fill Quantity: 90,   # refills: 0  Last Office Visit: 9/9/24  Future Office visit:    Next 5 appointments (look out 90 days)      Mar 07, 2025 8:20 AM  (Arrive by 8:05 AM)  Return Visit with Yaneli Gray NP  [facility] ([facility] ) [address] [place] [id number]  [phone]             Routing refill request to provider for review/approval because:  Normal TSH on file in past 12 months        Recent Labs   Lab Test 10/23/24  0735   TSH 8.01*

## 2025-03-07 ENCOUNTER — LAB (OUTPATIENT)
Dept: LAB | Facility: OTHER | Age: 85
End: 2025-03-07
Attending: NURSE PRACTITIONER
Payer: COMMERCIAL

## 2025-03-07 DIAGNOSIS — E03.9 HYPOTHYROIDISM, UNSPECIFIED TYPE: ICD-10-CM

## 2025-03-07 DIAGNOSIS — C91.11 CHRONIC LYMPHOCYTIC LEUKEMIA IN REMISSION (H): ICD-10-CM

## 2025-03-07 LAB
ALBUMIN SERPL BCG-MCNC: 4.7 G/DL (ref 3.5–5.2)
ALP SERPL-CCNC: 96 U/L (ref 40–150)
ALT SERPL W P-5'-P-CCNC: 22 U/L (ref 0–50)
ANION GAP SERPL CALCULATED.3IONS-SCNC: 11 MMOL/L (ref 7–15)
AST SERPL W P-5'-P-CCNC: 34 U/L (ref 0–45)
BASOPHILS # BLD AUTO: 0.1 10E3/UL (ref 0–0.2)
BASOPHILS NFR BLD AUTO: 1 %
BILIRUB SERPL-MCNC: 0.6 MG/DL
BUN SERPL-MCNC: 18.4 MG/DL (ref 8–23)
CALCIUM SERPL-MCNC: 9.5 MG/DL (ref 8.8–10.4)
CHLORIDE SERPL-SCNC: 103 MMOL/L (ref 98–107)
CREAT SERPL-MCNC: 0.86 MG/DL (ref 0.51–0.95)
EGFRCR SERPLBLD CKD-EPI 2021: 67 ML/MIN/1.73M2
EOSINOPHIL # BLD AUTO: 0.1 10E3/UL (ref 0–0.7)
EOSINOPHIL NFR BLD AUTO: 1 %
ERYTHROCYTE [DISTWIDTH] IN BLOOD BY AUTOMATED COUNT: 13.3 % (ref 10–15)
GLUCOSE SERPL-MCNC: 108 MG/DL (ref 70–99)
HCO3 SERPL-SCNC: 25 MMOL/L (ref 22–29)
HCT VFR BLD AUTO: 40.7 % (ref 35–47)
HGB BLD-MCNC: 13.7 G/DL (ref 11.7–15.7)
HOLD SPECIMEN: NORMAL
IMM GRANULOCYTES # BLD: 0 10E3/UL
IMM GRANULOCYTES NFR BLD: 0 %
LDH SERPL L TO P-CCNC: 221 U/L (ref 0–250)
LYMPHOCYTES # BLD AUTO: 2.2 10E3/UL (ref 0.8–5.3)
LYMPHOCYTES NFR BLD AUTO: 23 %
MCH RBC QN AUTO: 29.9 PG (ref 26.5–33)
MCHC RBC AUTO-ENTMCNC: 33.7 G/DL (ref 31.5–36.5)
MCV RBC AUTO: 89 FL (ref 78–100)
MONOCYTES # BLD AUTO: 0.6 10E3/UL (ref 0–1.3)
MONOCYTES NFR BLD AUTO: 6 %
NEUTROPHILS # BLD AUTO: 6.6 10E3/UL (ref 1.6–8.3)
NEUTROPHILS NFR BLD AUTO: 69 %
NRBC # BLD AUTO: 0 10E3/UL
NRBC BLD AUTO-RTO: 0 /100
PLATELET # BLD AUTO: 247 10E3/UL (ref 150–450)
POTASSIUM SERPL-SCNC: 3.9 MMOL/L (ref 3.4–5.3)
PROT SERPL-MCNC: 7.3 G/DL (ref 6.4–8.3)
RBC # BLD AUTO: 4.58 10E6/UL (ref 3.8–5.2)
SODIUM SERPL-SCNC: 139 MMOL/L (ref 135–145)
TSH SERPL DL<=0.005 MIU/L-ACNC: 3.04 UIU/ML (ref 0.3–4.2)
WBC # BLD AUTO: 9.6 10E3/UL (ref 4–11)

## 2025-03-07 PROCEDURE — 84443 ASSAY THYROID STIM HORMONE: CPT | Mod: ZL

## 2025-03-07 PROCEDURE — 36415 COLL VENOUS BLD VENIPUNCTURE: CPT | Mod: ZL

## 2025-03-07 PROCEDURE — 82310 ASSAY OF CALCIUM: CPT | Mod: ZL

## 2025-03-07 PROCEDURE — 85004 AUTOMATED DIFF WBC COUNT: CPT | Mod: ZL

## 2025-03-07 PROCEDURE — 85014 HEMATOCRIT: CPT | Mod: ZL

## 2025-03-07 PROCEDURE — 83615 LACTATE (LD) (LDH) ENZYME: CPT | Mod: ZL

## 2025-03-07 PROCEDURE — 84155 ASSAY OF PROTEIN SERUM: CPT | Mod: ZL

## 2025-04-07 ENCOUNTER — ONCOLOGY VISIT (OUTPATIENT)
Dept: ONCOLOGY | Facility: OTHER | Age: 85
End: 2025-04-07
Payer: COMMERCIAL

## 2025-04-07 DIAGNOSIS — Z95.828 PORT-A-CATH IN PLACE: Primary | ICD-10-CM

## 2025-06-16 DIAGNOSIS — E03.9 ACQUIRED HYPOTHYROIDISM: ICD-10-CM

## 2025-06-16 RX ORDER — LEVOTHYROXINE SODIUM 25 UG/1
25 TABLET ORAL DAILY
Qty: 90 TABLET | Refills: 3 | Status: SHIPPED | OUTPATIENT
Start: 2025-06-16

## 2025-06-16 NOTE — TELEPHONE ENCOUNTER
Synthroid      Last Written Prescription Date:  6/16/25  Last Fill Quantity: 90,   # refills: 3  Last Office Visit: 4/7/25  Future Office visit:       Routing refill request to provider for review/approval because:

## 2025-06-16 NOTE — TELEPHONE ENCOUNTER
Last signed by Dr De La Rosa      LEVOTHYROXINE 25MCG TABLET       Last Written Prescription Date:  01/08/2025  Last Fill Quantity: 90,   # refills: 3  Last Office Visit: 09/09/2024  Future Office visit:       Routing refill request to provider for review/approval because:    Thyroid Protocol [id number]/16/2025 11:51 AM   Protocol Details Recent (12 month) or future (90 days) visit with authorizing provider's specialty (provided they have been seen in the past 15 months)          Kimberly Boecker, RN

## 2025-06-18 RX ORDER — LEVOTHYROXINE SODIUM 25 UG/1
25 TABLET ORAL DAILY
Qty: 30 TABLET | Refills: 0 | OUTPATIENT
Start: 2025-06-18

## 2025-07-15 ENCOUNTER — OFFICE VISIT (OUTPATIENT)
Dept: FAMILY MEDICINE | Facility: OTHER | Age: 85
End: 2025-07-15
Attending: FAMILY MEDICINE
Payer: COMMERCIAL

## 2025-07-15 VITALS
HEART RATE: 87 BPM | BODY MASS INDEX: 20.99 KG/M2 | RESPIRATION RATE: 16 BRPM | WEIGHT: 100 LBS | TEMPERATURE: 97.7 F | OXYGEN SATURATION: 96 % | DIASTOLIC BLOOD PRESSURE: 70 MMHG | HEIGHT: 58 IN | SYSTOLIC BLOOD PRESSURE: 138 MMHG

## 2025-07-15 DIAGNOSIS — M81.0 AGE-RELATED OSTEOPOROSIS WITHOUT CURRENT PATHOLOGICAL FRACTURE: ICD-10-CM

## 2025-07-15 DIAGNOSIS — C91.11 CHRONIC LYMPHOCYTIC LEUKEMIA IN REMISSION (H): Primary | ICD-10-CM

## 2025-07-15 DIAGNOSIS — E03.9 ACQUIRED HYPOTHYROIDISM: ICD-10-CM

## 2025-07-15 DIAGNOSIS — E78.2 MIXED HYPERLIPIDEMIA: ICD-10-CM

## 2025-07-15 PROCEDURE — G0463 HOSPITAL OUTPT CLINIC VISIT: HCPCS | Mod: 25

## 2025-07-15 ASSESSMENT — PAIN SCALES - GENERAL: PAINLEVEL_OUTOF10: NO PAIN (0)

## 2025-07-15 NOTE — PROGRESS NOTES
"  Assessment & Plan     Chronic lymphocytic leukemia in remission (H)  Stable, oncology notes reviewed.     Age-related osteoporosis without current pathological fracture  On drug holiday. Tolerated fosamax well. This seemed to generally stabilize bone density, so could consider again if falling.   - DX Bone Density    Mixed hyperlipidemia  Not on statin. Did take simvastatin this caused myalgia. ? Tolerated pravastatin. Regardless, not currently on statin for primary prevention. Recheck at annual  - Lipid Profile (Chol, Trig, HDL, LDL calc)  - Comprehensive metabolic panel (BMP + Alb, Alk Phos, ALT, AST, Total. Bili, TP)    Acquired hypothyroidism  Labs prior to annual. Has been stable  - TSH with free T4 reflex      Follow-up  No follow-ups on file.    North Rodriguez is a 84 year old, presenting for the following health issues:  Thyroid Problem and Establish Care        7/15/2025     2:07 PM   Additional Questions   Roomed by Anusha Ku     History of Present Illness       Reason for visit:  To get established    She eats 2-3 servings of fruits and vegetables daily.She consumes 1 sweetened beverage(s) daily.She exercises with enough effort to increase her heart rate 9 or less minutes per day.  She exercises with enough effort to increase her heart rate 3 or less days per week.   She is taking medications regularly.        Hypothyroidism Follow-up    Since last visit, patient describes the following symptoms: Weight stable, no hair loss, no skin changes, no constipation, no loose stools    Osteoporosis.  - on drug holiday from fosamax. Was on x 5 years. Stopped 2022      Review of Systems  Constitutional, HEENT, cardiovascular, pulmonary, gi and gu systems are negative, except as otherwise noted.      Objective    /70 (BP Location: Left arm, Patient Position: Sitting, Cuff Size: Adult Regular)   Pulse 87   Temp 97.7  F (36.5  C) (Tympanic)   Resp 16   Ht 1.473 m (4' 10\")   Wt 45.4 kg (100 lb)   " SpO2 96%   BMI 20.90 kg/m    Body mass index is 20.9 kg/m .  Physical Exam   GENERAL: alert and no distress  RESP: lungs clear to auscultation - no rales, rhonchi or wheezes  CV: regular rates and rhythm, normal S1 S2, no S3 or S4, no murmur, click or rub, and no peripheral edema  PSYCH: mentation appears normal, affect normal/bright    No results found for any visits on 07/15/25.        Signed Electronically by: Hillary Lee MD    The longitudinal plan of care for the diagnosis(es)/condition(s) as documented were addressed during this visit. Due to the added complexity in care, I will continue to support Jennifer in the subsequent management and with ongoing continuity of care.

## (undated) DEVICE — DRSG ISLAND 4IN X 5IN 7540

## (undated) DEVICE — LABEL STERILE PREPRINTED FOR OR FRRH01-2M

## (undated) DEVICE — SU MONOCRYL 4-0 PS-2 18" UND Y496G

## (undated) DEVICE — GLV 7.5 BIOGEL LATEX 30475-01

## (undated) DEVICE — DRSG GAUZE 4X4" 3033

## (undated) DEVICE — APPLICATOR BNZN TNCT RYN SWBSTK NWVN SNGL APLS1106

## (undated) DEVICE — ESU GROUND PAD ADULT W/CORD E7507

## (undated) DEVICE — PACK LAPAROTOMY CUSTOM SBA32LPMBG

## (undated) DEVICE — SOL NACL 0.9% IRRIG 1000ML BOTTLE 2F7124

## (undated) DEVICE — GOWN SURG XL LVL 3 REINFORCED 9541

## (undated) DEVICE — SOL WATER IRRIG 1000ML BOTTLE 2F7114

## (undated) DEVICE — PREP CHLORAPREP 26ML TINTED HI-LITE ORANGE 930815

## (undated) DEVICE — COVER LT HANDLE 2/PK 5160-2FG

## (undated) DEVICE — SU DERMABOND ADVANCED .7ML DNX12

## (undated) DEVICE — SU VICRYL 3-0 SH 27" UND J416H

## (undated) DEVICE — TAPE MEDIPORE 4"X2YD 2864

## (undated) DEVICE — PACK BASIN SET UP SUTCNBSBBA

## (undated) RX ORDER — FENTANYL CITRATE 50 UG/ML
INJECTION, SOLUTION INTRAMUSCULAR; INTRAVENOUS
Status: DISPENSED
Start: 2024-04-16

## (undated) RX ORDER — PROPOFOL 10 MG/ML
INJECTION, EMULSION INTRAVENOUS
Status: DISPENSED
Start: 2024-04-16